# Patient Record
Sex: FEMALE | Race: WHITE | NOT HISPANIC OR LATINO | Employment: FULL TIME | URBAN - METROPOLITAN AREA
[De-identification: names, ages, dates, MRNs, and addresses within clinical notes are randomized per-mention and may not be internally consistent; named-entity substitution may affect disease eponyms.]

---

## 2023-03-22 ENCOUNTER — OFFICE VISIT (OUTPATIENT)
Dept: PODIATRY | Facility: CLINIC | Age: 78
End: 2023-03-22

## 2023-03-22 ENCOUNTER — APPOINTMENT (OUTPATIENT)
Dept: RADIOLOGY | Age: 78
End: 2023-03-22

## 2023-03-22 VITALS
BODY MASS INDEX: 23.04 KG/M2 | DIASTOLIC BLOOD PRESSURE: 80 MMHG | HEART RATE: 80 BPM | WEIGHT: 130 LBS | HEIGHT: 63 IN | SYSTOLIC BLOOD PRESSURE: 140 MMHG

## 2023-03-22 DIAGNOSIS — M19.072 OSTEOARTHRITIS OF FIRST METATARSOPHALANGEAL (MTP) JOINT OF LEFT FOOT: ICD-10-CM

## 2023-03-22 DIAGNOSIS — M20.12 HALLUX ABDUCTO VALGUS, LEFT: Primary | ICD-10-CM

## 2023-03-22 DIAGNOSIS — M20.12 HALLUX ABDUCTO VALGUS, LEFT: ICD-10-CM

## 2023-03-22 NOTE — PROGRESS NOTES
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  She states the shoes she is wearing today do not cause her much pain or discomfort  I was very honest with the patient and explained that if she can find a pair of shoes that reduce her pain and there is not much need to go forward with surgical reconstruction of the foot especially given that she is on Eliquis and that surgical reconstruction comes with its own risks  Patient has history of cancer involving chemotherapy and radiation  Before any bony surgery would be considered I would recommend she speak to her doctor about a bone density scan  She also states she needs a cardiologist as she has not had her defibrillator interrogated for quite some time  At the moment we had a simple discussion involving options with her foot  She needs a more further medical work-up as there are many questions I have regarding her overall general health for surgery  If the large bump is her only area of pain we could consider a simple exostectomy of the medial eminence as this would reduce the discomfort and different shoe gears  She could also consider a silicon joint implant but at the moment the joint is not her significant area of pain  Subjective:      Patient ID: Madelon Halsted is a 68 y o  female  Patient has chronic foot pain   For 8 years she has been getting shots into her bunion every 8-12 weeks by her podiatrist in [place]  She has severe pain from a bunion deformity  She is tired of injections but wants to know if there are different options  She has a pacemaker for a weak heart but never had heart attack  She has had colon cancer but in remission  She does not smoke  Her only meds are for her synthroid, elequis, and asthma medication  The following portions of the patient's history were reviewed and updated as appropriate: allergies, current medications, past family history, past medical history, past social history, past surgical history and problem list     Review of Systems    Constitutional: Negative  Respiratory: Negative for cough and shortness of breath  Gastrointestinal: Negative for diarrhea, nausea and vomiting  Musculoskeletal: bunion pain   Skin: Negative for rash or wound  Neurological: Negative for weakness, numbness and headaches  Objective:      /80   Pulse 80   Ht 5' 3" (1 6 m)   Wt 59 kg (130 lb)   BMI 23 03 kg/m²          Physical Exam  Vitals reviewed  Cardiovascular:      Pulses: Normal pulses  Dorsalis pedis pulses are 2+ on the left side  Posterior tibial pulses are 2+ on the left side  Pulmonary:      Effort: Pulmonary effort is normal  No respiratory distress  Musculoskeletal:         General: Tenderness and deformity present  Left foot: Decreased range of motion (crepitus first MTPJ)  Deformity and bunion (large medial eminence  ) present  Feet:      Left foot:      Protective Sensation: 10 sites tested  10 sites sensed  Skin integrity: No ulcer or skin breakdown  Skin:     Findings: No erythema or rash  Neurological:      Mental Status: She is alert and oriented to person, place, and time  Sensory: No sensory deficit  XRay 3 views of the left foot personally read by Dr Efrain Pinzon in office today and discussed with patient:  1  IM angle 18 degrees  2   Severe cartilage loss first MTPJ

## 2023-04-07 ENCOUNTER — OFFICE VISIT (OUTPATIENT)
Dept: INTERNAL MEDICINE CLINIC | Facility: CLINIC | Age: 78
End: 2023-04-07

## 2023-04-07 VITALS
HEIGHT: 63 IN | RESPIRATION RATE: 16 BRPM | DIASTOLIC BLOOD PRESSURE: 80 MMHG | SYSTOLIC BLOOD PRESSURE: 138 MMHG | WEIGHT: 127.8 LBS | HEART RATE: 80 BPM | BODY MASS INDEX: 22.64 KG/M2 | OXYGEN SATURATION: 97 % | TEMPERATURE: 97.5 F

## 2023-04-07 DIAGNOSIS — Z95.0 PRESENCE OF PERMANENT CARDIAC PACEMAKER: ICD-10-CM

## 2023-04-07 DIAGNOSIS — Z00.00 HEALTHCARE MAINTENANCE: ICD-10-CM

## 2023-04-07 DIAGNOSIS — E03.9 HYPOTHYROIDISM, UNSPECIFIED TYPE: ICD-10-CM

## 2023-04-07 DIAGNOSIS — J45.20 MILD INTERMITTENT ASTHMA WITHOUT COMPLICATION: ICD-10-CM

## 2023-04-07 DIAGNOSIS — H91.93 BILATERAL HEARING LOSS, UNSPECIFIED HEARING LOSS TYPE: ICD-10-CM

## 2023-04-07 DIAGNOSIS — M79.10 MYALGIA: ICD-10-CM

## 2023-04-07 DIAGNOSIS — Z85.820 HISTORY OF MELANOMA: ICD-10-CM

## 2023-04-07 DIAGNOSIS — Z85.038 HISTORY OF COLON CANCER: ICD-10-CM

## 2023-04-07 DIAGNOSIS — I10 PRIMARY HYPERTENSION: Primary | ICD-10-CM

## 2023-04-07 PROBLEM — IMO0001 HEARING IMPAIRMENT: Status: ACTIVE | Noted: 2023-04-07

## 2023-04-07 PROBLEM — C18.9 COLON CANCER (HCC): Status: ACTIVE | Noted: 2023-04-07

## 2023-04-07 PROBLEM — C18.9 COLON CANCER (HCC): Status: RESOLVED | Noted: 2023-04-07 | Resolved: 2023-04-07

## 2023-04-07 PROBLEM — H91.90 HEARING IMPAIRMENT: Status: ACTIVE | Noted: 2023-04-07

## 2023-04-07 RX ORDER — TEMAZEPAM 15 MG/1
15 CAPSULE ORAL
COMMUNITY

## 2023-04-07 RX ORDER — ALBUTEROL SULFATE 90 UG/1
AEROSOL, METERED RESPIRATORY (INHALATION)
COMMUNITY

## 2023-04-07 RX ORDER — MONTELUKAST SODIUM 10 MG/1
10 TABLET ORAL
COMMUNITY

## 2023-04-07 RX ORDER — FLUTICASONE FUROATE AND VILANTEROL 100; 25 UG/1; UG/1
1 POWDER RESPIRATORY (INHALATION) DAILY
COMMUNITY

## 2023-04-07 RX ORDER — LOSARTAN POTASSIUM 50 MG/1
50 TABLET ORAL DAILY
COMMUNITY

## 2023-04-07 RX ORDER — FLECAINIDE ACETATE 100 MG/1
100 TABLET ORAL 2 TIMES DAILY
COMMUNITY

## 2023-04-07 RX ORDER — LEVOTHYROXINE AND LIOTHYRONINE 9.5; 2.25 UG/1; UG/1
TABLET ORAL
COMMUNITY

## 2023-04-07 NOTE — PROGRESS NOTES
Assessment/Plan:   Patient Instructions   We will need to obtain records from providers in [place]  Have labs done and we will review  Referrals to cardiology, gastroenterology, dermatology, audiology, gynecology  Follow-up in 6 weeks, sooner as needed  Quality Measures:   Depression Screening and Follow-up Plan: Patient was screened for depression during today's encounter  They screened negative with a PHQ-2 score of 0  Return in about 6 weeks (around 5/19/2023) for Next scheduled follow up  Diagnoses and all orders for this visit:    Primary hypertension  -     CBC and differential; Future  -     Comprehensive metabolic panel; Future  -     Lipid panel; Future    Hypothyroidism, unspecified type  -     TSH, 3rd generation; Future  -     T4, free; Future    Mild intermittent asthma without complication    Bilateral hearing loss, unspecified hearing loss type  -     Ambulatory Referral to Audiology; Future    History of melanoma  -     Ambulatory referral to Dermatology; Future    Presence of permanent cardiac pacemaker  -     Ambulatory referral to Cardiology; Future    Myalgia  -     Vitamin D 25 hydroxy; Future    Healthcare maintenance  -     Ambulatory referral to Gynecology; Future    History of colon cancer  -     Ambulatory referral to Gastroenterology; Future    Other orders  -     albuterol (PROVENTIL HFA,VENTOLIN HFA) 90 mcg/act inhaler; Inhale  -     montelukast (SINGULAIR) 10 mg tablet; Take 10 mg by mouth  -     thyroid (ARMOUR) 15 MG tablet; Take by mouth  -     apixaban (Eliquis) 5 mg; Take 5 mg by mouth 2 (two) times a day  -     losartan (COZAAR) 50 mg tablet; Take 50 mg by mouth daily  -     temazepam (RESTORIL) 15 mg capsule; Take 15 mg by mouth daily at bedtime as needed for sleep  -     flecainide (TAMBOCOR) 100 mg tablet; Take 100 mg by mouth 2 (two) times a day  -     Cancel: Ambulatory referral to Gastroenterology;  Future  -     Fluticasone Furoate-Vilanterol 100-25 He always had  multiple nodules, reported on prior CT scan and recent CT scan.   Please let him know approach is similar and based on the size of the nodule mainly mcg/actuation inhaler; Inhale 1 puff daily Rinse mouth after use  Subjective:      Patient ID: Elliot Brumfield is a 68 y o  female  71-year-old female presents to establish with this practice accompanied by her sister-in-law who is a patient here along with her   Patient has moved from [place] and is currently residing with the in-laws  Patient is a vague historian  Patient is currently on flecainide and Eliquis, along with having a permanent pacemaker without knowledge of why she is on the medication or why the pacemaker was inserted  There are currently no records in the EMR to review regarding these conditions  Patient does have history of hypertension: Decent control today on current medication  Geno cp, palp, sob, edema, HA, dizziness, diaphoresis, syncope, visual disturbance  History of colon cancer, states she has not had colonoscopy in approximately 8 years  Denies any blood in her stool or abdominal pain  Past history of melanoma of her left arm  No recent follow-up with dermatology  She believes she has had a mammogram within 2 years  No GYN evaluation in over 2 years  Probable history of hypothyroidism for which she is on [place] Thyroid  Asymptomatic  History of asthma, she is on Breo, and has albuterol inhaler to use as needed  States she was very stable when in [place]  Also takes montelukast nightly  Sister-in-law and also patient notes decreased hearing  In fact patient wears an over-the-counter hearing aid in her left ear  They would like a hearing evaluation  No focal complaints today      History/PE as documented in the EMR      ALLERGIES:  Allergies   Allergen Reactions   • Codeine Nausea Only       CURRENT MEDICATIONS:    Current Outpatient Medications:   •  albuterol (PROVENTIL HFA,VENTOLIN HFA) 90 mcg/act inhaler, Inhale, Disp: , Rfl:   •  apixaban (Eliquis) 5 mg, Take 5 mg by mouth 2 (two) times a day, Disp: , Rfl:   •  flecainide (TAMBOCOR) 100 mg tablet, Take 100 mg by mouth 2 (two) times a day, Disp: , Rfl:   •  Fluticasone Furoate-Vilanterol 100-25 mcg/actuation inhaler, Inhale 1 puff daily Rinse mouth after use , Disp: , Rfl:   •  losartan (COZAAR) 50 mg tablet, Take 50 mg by mouth daily, Disp: , Rfl:   •  montelukast (SINGULAIR) 10 mg tablet, Take 10 mg by mouth, Disp: , Rfl:   •  temazepam (RESTORIL) 15 mg capsule, Take 15 mg by mouth daily at bedtime as needed for sleep, Disp: , Rfl:   •  thyroid (ARMOUR) 15 MG tablet, Take by mouth, Disp: , Rfl:     ACTIVE PROBLEM LIST:  Patient Active Problem List   Diagnosis   • History of melanoma   • Hypothyroid   • Primary hypertension   • Mild intermittent asthma without complication   • Presence of permanent cardiac pacemaker   • Hearing impairment   • History of colon cancer       PAST MEDICAL HISTORY:  Past Medical History:   Diagnosis Date   • Asthma    • Colon cancer ([facility] [address] )        PAST SURGICAL HISTORY:  Past Surgical History:   Procedure Laterality Date   • CARDIAC PACEMAKER PLACEMENT  2020   • COLON SURGERY     • CRANIOTOMY Right 1989    R bleed   • SKIN CANCER EXCISION Right     Melanoma R arm       FAMILY HISTORY:  Family History   Problem Relation Age of Onset   • Heart disease Mother    • COPD Sister    • Emphysema Sister    • Colon cancer Brother    • Liver cancer Paternal Grandfather        SOCIAL HISTORY:  Social History     Socioeconomic History   • Marital status: Single     Spouse name: Not on file   • Number of children: Not on file   • Years of education: Not on file   • Highest education level: Not on file   Occupational History   • Not on file   Tobacco Use   • Smoking status: Never   • Smokeless tobacco: Never   Vaping Use   • Vaping Use: Never used   Substance and Sexual Activity   • Alcohol use: Not Currently   • Drug use: Never   • Sexual activity: Not Currently   Other Topics Concern   • Not on file   Social History Narrative    Divorsed        1 child "Hobbies-art    Reg dental care, brushes twice daily     Social Determinants of Health     Financial Resource Strain: Not on file   Food Insecurity: Not on file   Transportation Needs: Not on file   Physical Activity: Not on file   Stress: Not on file   Social Connections: Not on file   Intimate Partner Violence: Not on file   Housing Stability: Not on file       Review of Systems   Constitutional: Negative for activity change, chills, fatigue and fever  HENT: Positive for hearing loss (Wears an OTC hearing aid in her left ear)  Negative for congestion  Eyes: Negative for discharge  Respiratory: Negative for cough, chest tightness, shortness of breath and wheezing  Cardiovascular: Negative for chest pain, palpitations and leg swelling  Gastrointestinal: Negative for abdominal pain, blood in stool, constipation, diarrhea, nausea and vomiting  Endocrine: Negative for polydipsia, polyphagia and polyuria  Genitourinary: Negative for difficulty urinating  Musculoskeletal: Negative for arthralgias and myalgias  Skin: Negative for rash  Allergic/Immunologic: Negative for immunocompromised state  Neurological: Negative for dizziness, syncope, weakness, light-headedness and headaches  Hematological: Negative for adenopathy  Does not bruise/bleed easily  Psychiatric/Behavioral: Positive for sleep disturbance (Has been on chronic Restoril for years)  Negative for dysphoric mood  The patient is not nervous/anxious  Objective:  Vitals:    04/07/23 0900   BP: 138/80   BP Location: Left arm   Patient Position: Sitting   Cuff Size: Adult   Pulse: 80   Resp: 16   Temp: 97 5 °F (36 4 °C)   TempSrc: Tympanic   SpO2: 97%   Weight: 58 kg (127 lb 12 8 oz)   Height: 5' 3\" (1 6 m)     Body mass index is 22 64 kg/m²  Physical Exam  Vitals and nursing note reviewed  Constitutional:       General: She is not in acute distress  Appearance: She is well-developed  She is not ill-appearing     HENT:    " Head: Normocephalic and atraumatic  Right Ear: Tympanic membrane, ear canal and external ear normal       Left Ear: Tympanic membrane, ear canal and external ear normal       Nose: Nose normal       Mouth/Throat:      Mouth: Mucous membranes are moist       Pharynx: Oropharynx is clear  Comments: Edentulous maxilla, corrected  Eyes:      General: No scleral icterus  Right eye: No discharge  Left eye: No discharge  Extraocular Movements: Extraocular movements intact  Conjunctiva/sclera: Conjunctivae normal       Pupils: Pupils are equal, round, and reactive to light  Neck:      Thyroid: No thyromegaly  Vascular: Carotid bruit (Bilateral carotid bruits, may represent transmitted murmur) present  Cardiovascular:      Rate and Rhythm: Normal rate and regular rhythm  Pulses: Normal pulses  Heart sounds: Murmur (Grade 2/6 systolic murmur best audible at the apex, grade 2 or 6 systolic murmur best audible in the aortic area radiating to the carotids) heard  Pulmonary:      Effort: Pulmonary effort is normal  No respiratory distress  Comments: Decreased breath sounds in the bases  Chest:      Chest wall: No tenderness  Abdominal:      General: Abdomen is flat  Bowel sounds are normal  There is no distension  Palpations: Abdomen is soft  There is no hepatomegaly, splenomegaly or mass  Tenderness: There is no abdominal tenderness  There is no right CVA tenderness, left CVA tenderness, guarding or rebound  Musculoskeletal:         General: No swelling  Normal range of motion  Cervical back: Normal range of motion and neck supple  Right lower leg: No edema  Left lower leg: No edema  Lymphadenopathy:      Cervical: No cervical adenopathy  Skin:     General: Skin is warm and dry  Findings: No rash  Neurological:      General: No focal deficit present  Mental Status: She is alert and oriented to person, place, and time  Cranial Nerves: No cranial nerve deficit  Sensory: No sensory deficit  Motor: No weakness  Coordination: Coordination normal       Gait: Gait normal       Deep Tendon Reflexes: Reflexes are normal and symmetric  Reflexes normal    Psychiatric:         Mood and Affect: Mood normal          Behavior: Behavior normal          Thought Content: Thought content normal          Judgment: Judgment normal            RESULTS:    In chart    This note was created with voice recognition software  Phonic, grammatical and spelling errors may be present within the note as a result

## 2023-04-07 NOTE — PATIENT INSTRUCTIONS
We will need to obtain records from providers in [place]  Have labs done and we will review  Referrals to cardiology, gastroenterology, dermatology, audiology, gynecology  Follow-up in 6 weeks, sooner as needed

## 2023-04-22 ENCOUNTER — HOSPITAL ENCOUNTER (EMERGENCY)
Facility: HOSPITAL | Age: 78
Discharge: HOME | End: 2023-04-22
Attending: EMERGENCY MEDICINE

## 2023-04-22 VITALS
DIASTOLIC BLOOD PRESSURE: 65 MMHG | RESPIRATION RATE: 16 BRPM | HEART RATE: 77 BPM | TEMPERATURE: 97.5 F | OXYGEN SATURATION: 97 % | SYSTOLIC BLOOD PRESSURE: 143 MMHG

## 2023-04-22 DIAGNOSIS — S81.802A NON-HEALING WOUND OF LEFT LOWER EXTREMITY: Primary | ICD-10-CM

## 2023-04-22 RX ORDER — GINSENG 100 MG
1 CAPSULE ORAL ONCE
Status: COMPLETED | OUTPATIENT
Start: 2023-04-22 | End: 2023-04-22

## 2023-04-22 RX ADMIN — BACITRACIN ZINC 1 SMALL APPLICATION: 500 OINTMENT TOPICAL at 14:02

## 2023-04-22 RX ADMIN — TETANUS TOXOID, REDUCED DIPHTHERIA TOXOID AND ACELLULAR PERTUSSIS VACCINE, ADSORBED 0.5 ML: 5; 2.5; 8; 8; 2.5 SUSPENSION INTRAMUSCULAR at 14:10

## 2023-04-22 NOTE — ED PROVIDER NOTES
History  Chief Complaint   Patient presents with   • Leg Injury     Pt ran into the  lid 1 month ago, has had laceration to L ankle area since  Reports its not healing and continues to bleed  Pt is on thinners  HPI    Prior to Admission Medications   Prescriptions Last Dose Informant Patient Reported? Taking? Fluticasone Furoate-Vilanterol 100-25 mcg/actuation inhaler   Yes No   Sig: Inhale 1 puff daily Rinse mouth after use  albuterol (PROVENTIL HFA,VENTOLIN HFA) 90 mcg/act inhaler   Yes No   Sig: Inhale   apixaban (Eliquis) 5 mg   Yes No   Sig: Take 5 mg by mouth 2 (two) times a day   flecainide (TAMBOCOR) 100 mg tablet   Yes No   Sig: Take 100 mg by mouth 2 (two) times a day   losartan (COZAAR) 50 mg tablet   Yes No   Sig: Take 50 mg by mouth daily   montelukast (SINGULAIR) 10 mg tablet   Yes No   Sig: Take 10 mg by mouth   temazepam (RESTORIL) 15 mg capsule   Yes No   Sig: Take 15 mg by mouth daily at bedtime as needed for sleep   thyroid (ARMOUR) 15 MG tablet   Yes No   Sig: Take by mouth      Facility-Administered Medications: None       Past Medical History:   Diagnosis Date   • Asthma    • Colon cancer (HCC)        Past Surgical History:   Procedure Laterality Date   • CARDIAC PACEMAKER PLACEMENT  2020   • COLON SURGERY     • CRANIOTOMY Right 1989    R bleed   • SKIN CANCER EXCISION Right     Melanoma R arm       Family History   Problem Relation Age of Onset   • Heart disease Mother    • COPD Sister    • Emphysema Sister    • Colon cancer Brother    • Liver cancer Paternal Grandfather      I have reviewed and agree with the history as documented      E-Cigarette/Vaping   • E-Cigarette Use Never User      E-Cigarette/Vaping Substances   • Nicotine No    • THC No    • CBD No    • Flavoring No    • Other No    • Unknown No      Social History     Tobacco Use   • Smoking status: Never   • Smokeless tobacco: Never   Vaping Use   • Vaping Use: Never used   Substance Use Topics   • Alcohol use: Not Currently   • Drug use: Never       Review of Systems    Physical Exam  Physical Exam  Vitals and nursing note reviewed  Constitutional:       General: She is not in acute distress  Appearance: She is well-developed  HENT:      Head: Normocephalic and atraumatic  Eyes:      Conjunctiva/sclera: Conjunctivae normal       Pupils: Pupils are equal, round, and reactive to light  Neck:      Trachea: No tracheal deviation  Cardiovascular:      Rate and Rhythm: Normal rate and regular rhythm  Pulses:           Dorsalis pedis pulses are 2+ on the left side  Pulmonary:      Effort: Pulmonary effort is normal  No respiratory distress  Musculoskeletal:      Cervical back: Normal range of motion  Skin:     General: Skin is warm and dry  Comments: Wound to leg (see picture below)- bandage damp, mild drip blood, no other drainage  Thick layer of antibiotic ointment  Roughly oval shaped, 4 cm max diameter, loss of top layer of skin, moist, mild maceration around wound edges  Mild purplish discoloration just around edge of wound just beneath bandage, no warmth or erythema, no streaking redness   Neurological:      Mental Status: She is alert and oriented to person, place, and time  GCS: GCS eye subscore is 4  GCS verbal subscore is 5  GCS motor subscore is 6     Psychiatric:         Behavior: Behavior normal                        Vital Signs  ED Triage Vitals [04/22/23 1302]   Temperature Pulse Respirations Blood Pressure SpO2   97 5 °F (36 4 °C) 77 16 143/65 97 %      Temp Source Heart Rate Source Patient Position - Orthostatic VS BP Location FiO2 (%)   Temporal Monitor Sitting Left arm --      Pain Score       9           Vitals:    04/22/23 1302   BP: 143/65   Pulse: 77   Patient Position - Orthostatic VS: Sitting         Visual Acuity      ED Medications  Medications - No data to display    Diagnostic Studies  Results Reviewed     None                 No orders to display "      Procedures  Procedures         ED Course               Identification of Seniors at Risk    Flowsheet Row Most Recent Value   (ISAR) Identification of Seniors at Risk    Before the illness or injury that brought you to the Emergency, did you need someone to help you on a regular basis? 0 Filed at: 04/22/2023 1305   In the last 24 hours, have you needed more help than usual? 0 Filed at: 04/22/2023 1305   Have you been hospitalized for one or more nights during the past 6 months? 0 Filed at: 04/22/2023 1305   In general, do you see well? 0 Filed at: 04/22/2023 1305   In general, do you have serious problems with your memory? 0 Filed at: 04/22/2023 1305   Do you take more than three different medications every day? 1 Filed at: 04/22/2023 1305   ISAR Score 1 Filed at: 04/22/2023 1305                      SBIRT 20yo+    Flowsheet Row Most Recent Value   Initial Alcohol Screen: US AUDIT-C     1  How often do you have a drink containing alcohol? 0 Filed at: 04/22/2023 1305   2  How many drinks containing alcohol do you have on a typical day you are drinking? 0 Filed at: 04/22/2023 1305   3a  Male UNDER 65: How often do you have five or more drinks on one occasion? 0 Filed at: 04/22/2023 1305   3b  FEMALE Any Age, or MALE 65+: How often do you have 4 or more drinks on one occassion? 0 Filed at: 04/22/2023 1305   Audit-C Score 0 Filed at: 04/22/2023 1305   STARR: How many times in the past year have you    Used an illegal drug or used a prescription medication for non-medical reasons? Never Filed at: 04/22/2023 1305                    Medical Decision Making  This is a 72-year-old female who presents here today for evaluation of a nonhealing wound to her left lower leg  She says about a month or so ago she had a \"gash\" to her leg that she says is not healing  She says she walked into the corner of her open  door and that there was a flap of skin  She says she pressed it back down over the wound    She " does not know if and when it fell off  She says she has been cleaning it several times a day with hydrogen peroxide, and keeping it covered with Neosporin and a bandage  She says it does not seem to be getting better  She is having pain in the area and has been taking Tylenol and ibuprofen without significant improvement  She denies fevers, streaking redness, nausea or vomiting, other systemic symptoms  She says she will sometimes get mild bloody discharge, but will not get purulent discharge  She denies known prior problems with wound healing  She says she saw her new doctor at the beginning of the month to establish care, and mentioned her wound  She was told at the time that she was doing what she should  However, she says it does not seem to be getting better  Review of systems: Otherwise negative listed as above    She is well-appearing, no acute distress  She has a wound to her left lower leg as above, without signs of acute infection  She has 2+ peripheral pulses  Exam is otherwise unremarkable  Some of the skin discoloration may be due to the Neosporin  Appearance of the wound is likely due to a combination of excess moisture given the macerated skin at the edges, and use of hydrogen peroxide preventing wound healing  I discussed with patient and sister findings, management of the wound at home, follow-up closely with wound care for further evaluation and management, and indications for return, and they expressed understanding with this plan      Non-healing wound of left lower extremity: acute illness or injury      Disposition  Final diagnoses:   Non-healing wound of left lower extremity     Time reflects when diagnosis was documented in both MDM as applicable and the Disposition within this note     Time User Action Codes Description Comment    4/22/2023  1:39 PM Kym Buchanan Add [[id number] [id number]] Non-healing wound of left lower extremity       ED Disposition     ED Disposition Discharge    Condition   Fair    Date/Time   Sat Apr 22, 2023 [address] discharge to home/self care           Follow-up Information     Follow up With Specialties Details Why Contact Info Additional Information    [address] Schedule an appointment as soon as possible for a visit  to follow up on the wound [address] [address] [facility] [id number] [facility] [address], [employer], [address]            Patient's Medications   Discharge Prescriptions    No medications on file           PDMP Review     None          ED Provider  Electronically Signed by           Nona Dunbar MD  04/23/23 7065

## 2023-04-22 NOTE — DISCHARGE INSTRUCTIONS
Gently clean the wound with soap and water when you take a shower and if it is dirty  Cover it with a thin coating of bacitracin and a Band-Aid when you are doing anything where it might get dirty  Otherwise, leave it open and exposed to air as much as possible as moisture can cause further skin breakdown  Neosporin can cause irritation in some people, so avoid using this  Do not clean the wound with peroxide unless it is grossly dirty, as frequent use can kill off newly forming skin cells  Follow-up with wound care for further evaluation and management of your wound, and to ensure that it is healing appropriately

## 2023-04-27 ENCOUNTER — OFFICE VISIT (OUTPATIENT)
Dept: WOUND CARE | Facility: CLINIC | Age: 78
End: 2023-04-27

## 2023-04-27 VITALS
HEART RATE: 80 BPM | HEIGHT: 62 IN | BODY MASS INDEX: 23.19 KG/M2 | SYSTOLIC BLOOD PRESSURE: 180 MMHG | WEIGHT: 126 LBS | RESPIRATION RATE: 18 BRPM | DIASTOLIC BLOOD PRESSURE: 84 MMHG | TEMPERATURE: 95.5 F

## 2023-04-27 DIAGNOSIS — S81.802A TRAUMATIC OPEN WOUND OF LEFT LOWER LEG, INITIAL ENCOUNTER: Primary | ICD-10-CM

## 2023-04-27 RX ORDER — LIDOCAINE 40 MG/G
CREAM TOPICAL ONCE
Status: COMPLETED | OUTPATIENT
Start: 2023-04-27 | End: 2023-04-27

## 2023-04-27 RX ADMIN — LIDOCAINE: 40 CREAM TOPICAL at 09:35

## 2023-04-27 NOTE — PROGRESS NOTES
Patient ID: Veto Hair is a 68 y o  female Date of Birth 1945       Chief Complaint   Patient presents with   • New Patient Visit     Left leg wound       Allergies:  Codeine    Diagnosis:      Diagnosis ICD-10-CM Associated Orders   1  Traumatic open wound of left lower leg, initial encounter  S81 802A lidocaine (LMX) 4 % cream     Wound cleansing and dressings              Assessment and Plan :  • Initial Evaluation of open traumatic wound on LLE  No signs of infection today  • Debrided as below  • Wound management with 5 min Prophase soak and Maxorb Ag on wound bed  See wound orders below   • Compression with Tubigrip  • No harsh cleansers such as alcohol, peroxide, or antibacterial soap, do not submerge in water  • Can cleanse with mild soap and water at dressing changes  • Counseled on importance of frequent elevation of leg and increase exercise/walkin for wound healing  • Followup in 1 week(s) or call sooner with questions or concerns or any signs of infection such as redness, swelling, increased/purulent drainage, fever, chills, increased severe pain  Subjective:   Pt is a 68 y o  female with pmhx Colon Cancer, Melanoma, Hypothyroidism, HTN, asthma, decreased hearing and pacemaker placement on Eliquis who presents for initial eval of LLE open traumatic wound which has been present for about a month after she bumped her leg against the  door  Pt has been cleaning wound with hydrogen peroxide and applying neosporin on the wound bed  Report serosanguinous drainage  No diabetes   No smoking, ETOH or drug use  Pt denies any sob, fatigue, N/V, CP, fever or chills          The following portions of the patient's history were reviewed and updated as appropriate:   Patient Active Problem List   Diagnosis   • History of melanoma   • Hypothyroid   • Primary hypertension   • Mild intermittent asthma without complication   • Presence of permanent cardiac pacemaker   • Hearing impairment   • History of colon cancer     Past Medical History:   Diagnosis Date   • Asthma    • Colon cancer ([address] )      Past Surgical History:   Procedure Laterality Date   • CARDIAC PACEMAKER PLACEMENT  2020   • COLON SURGERY     • CRANIOTOMY Right 1989    R bleed   • SKIN CANCER EXCISION Right     Melanoma R arm     Family History   Problem Relation Age of Onset   • Heart disease Mother    • COPD Sister    • Emphysema Sister    • Colon cancer Brother    • Liver cancer Paternal Grandfather       Social History     Socioeconomic History   • Marital status: Single     Spouse name: None   • Number of children: None   • Years of education: None   • Highest education level: None   Occupational History   • None   Tobacco Use   • Smoking status: Never   • Smokeless tobacco: Never   Vaping Use   • Vaping Use: Never used   Substance and Sexual Activity   • Alcohol use: Not Currently   • Drug use: Never   • Sexual activity: Not Currently   Other Topics Concern   • None   Social History Narrative    Divorsed        1 child    Hobbies-art    Reg dental care, brushes twice daily     Social Determinants of Health     Financial Resource Strain: Not on file   Food Insecurity: Not on file   Transportation Needs: Not on file   Physical Activity: Not on file   Stress: Not on file   Social Connections: Not on file   Intimate Partner Violence: Not on file   Housing Stability: Not on file        Current Outpatient Medications:   •  albuterol (PROVENTIL HFA,VENTOLIN HFA) 90 mcg/act inhaler, Inhale, Disp: , Rfl:   •  apixaban (ELIQUIS) 5 mg, Take 5 mg by mouth 2 (two) times a day, Disp: , Rfl:   •  flecainide (TAMBOCOR) 100 mg tablet, Take 100 mg by mouth 2 (two) times a day, Disp: , Rfl:   •  Fluticasone Furoate-Vilanterol 100-25 mcg/actuation inhaler, Inhale 1 puff daily Rinse mouth after use , Disp: , Rfl:   •  losartan (COZAAR) 50 mg tablet, Take 50 mg by mouth daily, Disp: , Rfl:   •  montelukast (SINGULAIR) 10 mg tablet, Take 10 mg "by mouth, Disp: , Rfl:   •  thyroid (ARMOUR) 15 MG tablet, Take by mouth, Disp: , Rfl:   •  temazepam (RESTORIL) 15 mg capsule, Take 15 mg by mouth daily at bedtime as needed for sleep (Patient not taking: Reported on 4/27/2023), Disp: , Rfl:   No current facility-administered medications for this visit  Review of Systems   Constitutional: Negative for appetite change, chills, fatigue, fever and unexpected weight change  HENT: Negative for congestion, hearing loss and postnasal drip  Respiratory: Negative for cough and shortness of breath  Cardiovascular: Negative for leg swelling  Skin: Positive for wound (LLE)  Negative for rash  Neurological: Negative for numbness  Hematological: Does not bruise/bleed easily  Psychiatric/Behavioral: Negative  Objective:  BP (!) 180/84   Pulse 80   Temp (!) 95 5 °F (35 3 °C)   Resp 18   Ht 5' 2\" (1 575 m)   Wt 57 2 kg (126 lb)   BMI 23 05 kg/m²   Pain Score:   3     Physical Exam  Vitals reviewed  Constitutional:       General: She is not in acute distress  Appearance: Normal appearance  She is well-developed and normal weight  HENT:      Head: Normocephalic and atraumatic  Cardiovascular:      Rate and Rhythm: Normal rate  Pulmonary:      Effort: Pulmonary effort is normal    Musculoskeletal:         General: No deformity  Right lower leg: No edema  Left lower leg: No edema  Skin:     General: Skin is warm and dry  Findings: Wound (LLE) present  Comments: Yellow slough on pink granulated wound bed  See wound assessment   Neurological:      General: No focal deficit present  Mental Status: She is alert and oriented to person, place, and time  Gait: Gait normal    Psychiatric:         Mood and Affect: Mood and affect normal          Behavior: Behavior normal  Behavior is cooperative                       Wound 04/27/23 Traumatic Pretibial Left (Active)   Wound Description Pink;Yellow 04/27/23 0931 " "  Stacia-wound Assessment Pink 04/27/23 0931   Wound Length (cm) 3 8 cm 04/27/23 0931   Wound Width (cm) 2 4 cm 04/27/23 0931   Wound Depth (cm) 0 1 cm 04/27/23 0931   Wound Surface Area (cm^2) 9 12 cm^2 04/27/23 0931   Wound Volume (cm^3) 0 912 cm^3 04/27/23 0931   Calculated Wound Volume (cm^3) 0 91 cm^3 04/27/23 0931   Drainage Amount Moderate 04/27/23 0931   Drainage Description Serosanguineous 04/27/23 0931   Non-staged Wound Description Full thickness 04/27/23 0931   Dressing Status Intact 04/27/23 0931       Debridement   Wound 04/27/23 Traumatic Pretibial Left    Universal Protocol:  Consent: Verbal consent obtained  Written consent obtained  Risks and benefits: risks, benefits and alternatives were discussed  Consent given by: patient  Time out: Immediately prior to procedure a \"time out\" was called to verify the correct patient, procedure, equipment, support staff and site/side marked as required  Patient understanding: patient states understanding of the procedure being performed  Patient identity confirmed: verbally with patient      Performed by: PA  Debridement type: surgical  Level of debridement: subcutaneous tissue  Pain control: lidocaine 4%  Post-debridement measurements  Length (cm): 3 8  Width (cm): 2 4  Depth (cm): 0 2  Percent debrided: 100%  Surface Area (cm^2): 9 12  Area debrided (cm^2): 9 12  Volume (cm^3): 1 82  Tissue and other material debrided: subcutaneous tissue  Devitalized tissue debrided: biofilm, exudate, fibrin and slough  Instrument(s) utilized: curette  Bleeding: small  Hemostasis obtained with: pressure  Procedural pain (0-10): 0  Post-procedural pain: 0   Response to treatment: procedure was tolerated well                   Wound Instructions:  Orders Placed This Encounter   Procedures   • Wound cleansing and dressings     Left Leg Wound    Wash your hands with soap and water  Remove old dressing, discard into plastic bag and place in trash    Cleanse the wound with prophase " "5 minute soak prior to applying a clean dressing  Do not use tissue or cotton balls  Do not scrub the wound  Pat dry using gauze  Shower no sponge bath only for this week       Apply Maxsorg Ag (silver alginate) to the leg wound  Cover with gauze or abd pad  Secure with joe and tape  Change dressing daily    Wear spandigrip F for light compression     Standing Status:   Future     Standing Expiration Date:   4/27/2024       Total time spent today:  30 minutes  This includes reviewing the patient's chart, pertinent physician records Dr Luana Burns, Emergency Medicine, 4/22/23, Dr Stu Alicea, Internal Medicine, 4/7/23  Eh Whipple PA-C, [facility]    Portions of the record may have been created with voice recognition software  Occasional wrong word or \"sound alike\" substitutions may have occurred due to the inherent limitations of voice recognition software  Read the chart carefully and recognize, using context, where substitutions have occurred      "

## 2023-05-04 ENCOUNTER — OFFICE VISIT (OUTPATIENT)
Dept: WOUND CARE | Facility: CLINIC | Age: 78
End: 2023-05-04

## 2023-05-04 VITALS
TEMPERATURE: 97.9 F | HEART RATE: 111 BPM | DIASTOLIC BLOOD PRESSURE: 72 MMHG | RESPIRATION RATE: 20 BRPM | SYSTOLIC BLOOD PRESSURE: 132 MMHG

## 2023-05-04 DIAGNOSIS — S81.802A TRAUMATIC OPEN WOUND OF LEFT LOWER LEG, INITIAL ENCOUNTER: Primary | ICD-10-CM

## 2023-05-04 RX ORDER — LIDOCAINE HYDROCHLORIDE 40 MG/ML
5 SOLUTION TOPICAL ONCE
Status: COMPLETED | OUTPATIENT
Start: 2023-05-04 | End: 2023-05-04

## 2023-05-04 RX ADMIN — LIDOCAINE HYDROCHLORIDE 5 ML: 40 SOLUTION TOPICAL at 11:12

## 2023-05-04 NOTE — PATIENT INSTRUCTIONS
Orders Placed This Encounter   Procedures    Wound cleansing and dressings     Left Leg Wound     Wash your hands with soap and water  Remove old dressing, discard into plastic bag and place in trash  Cleanse the wound with prophase 5 minute soak prior to applying a clean dressing  Do not use tissue or cotton balls  Do not scrub the wound  Pat dry using gauze  Shower no sponge bath only for this week       Apply Maxsorg Ag (silver alginate) to the leg wound    Cover with gauze or abd pad   Secure with joe and tape   Change dressing every other day and as needed    Wear spandigrip F for light compression     Standing Status:   Future     Standing Expiration Date:   5/4/2024

## 2023-05-04 NOTE — PROGRESS NOTES
"Patient ID: Starr Agrawal is a 68 y o  female Date of Birth 1945     Chief Complaint  Chief Complaint   Patient presents with    Follow Up Wound Care Visit     LLE wound       Allergies  Codeine    Assessment:    No problem-specific Assessment & Plan notes found for this encounter  Diagnoses and all orders for this visit:    Traumatic open wound of left lower leg, initial encounter  -     lidocaine (XYLOCAINE) 4 % topical solution 5 mL  -     Wound cleansing and dressings; Future  -     Debridement              Debridement   Wound 04/27/23 Traumatic Pretibial Left    Universal Protocol:  Consent: Verbal consent obtained  Consent given by: patient  Time out: Immediately prior to procedure a \"time out\" was called to verify the correct patient, procedure, equipment, support staff and site/side marked as required    Timeout called at: 5/4/2023 11:11 AM   Patient understanding: patient states understanding of the procedure being performed  Patient identity confirmed: verbally with patient      Performed by: physician  Debridement type: surgical  Level of debridement: subcutaneous tissue  Pain control: lidocaine 4%  Post-debridement measurements  Length (cm): 3 2  Width (cm): 2 3  Depth (cm): 0 2  Percent debrided: 100%  Surface Area (cm^2): 7 36  Area debrided (cm^2): 7 36  Volume (cm^3): 1 47  Tissue and other material debrided: subcutaneous tissue  Devitalized tissue debrided: exudate and slough  Instrument(s) utilized: curette  Bleeding: small  Hemostasis obtained with: pressure  Response to treatment: procedure was tolerated well          Plan:  Wound is improved  Debrided as above  Continue wound management with silver alginate but decrease dressing change frequency to every other day instead of daily, see wound orders below  Compression with Tubigrip's  Keep legs elevated whenever seated and avoid prolonged standing  Follow-up in 1 week or call sooner with questions or concerns      Wound 04/27/23 " Traumatic Pretibial Left (Active)   Wound Image Images linked 05/04/23 1117   Wound Description Pink;Yellow; Beefy red 05/04/23 1110   Stacia-wound Assessment [place] 05/04/23 1110   Wound Length (cm) 3 2 cm 05/04/23 1110   Wound Width (cm) 2 3 cm 05/04/23 1110   Wound Depth (cm) 0 1 cm 05/04/23 1110   Wound Surface Area (cm^2) 7 36 cm^2 05/04/23 1110   Wound Volume (cm^3) 0 736 cm^3 05/04/23 1110   Calculated Wound Volume (cm^3) 0 74 cm^3 05/04/23 1110   Change in Wound Size % 18 68 05/04/23 1110   Drainage Amount Moderate 05/04/23 1110   Drainage Description Serosanguineous 05/04/23 1110   Non-staged Wound Description Full thickness 05/04/23 1110   Dressing Status Intact 05/04/23 1110       Wound 04/27/23 Traumatic Pretibial Left (Active)   Date First Assessed/Time First Assessed: 04/27/23 0927   Primary Wound Type: Traumatic  Location: Pretibial  Wound Location Orientation: Left       Subjective:           4/27/23: Pt is a 68 y o  female with pmhx Colon Cancer, Melanoma, Hypothyroidism, HTN, asthma, decreased hearing and pacemaker placement on Eliquis who presents for initial eval of LLE open traumatic wound which has been present for about a month after she bumped her leg against the  door  Pt has been cleaning wound with hydrogen peroxide and applying neosporin on the wound bed  Report serosanguinous drainage  No diabetes   No smoking, ETOH or drug use  Pt denies any sob, fatigue, N/V, CP, fever or chills      5/4: Follow-up left lower extremity traumatic wound  No increased pain or drainage  Has been using silver alginate on the wound      The following portions of the patient's history were reviewed and updated as appropriate:   She  has a past medical history of Asthma and Colon cancer ([facility] [address] )    She   Patient Active Problem List    Diagnosis Date Noted    Hypothyroid 04/07/2023    Primary hypertension 04/07/2023    Mild intermittent asthma without complication 99/91/1837    Presence of permanent cardiac pacemaker 04/07/2023    Hearing impairment 04/07/2023    History of colon cancer 04/07/2023    History of melanoma 01/14/2013     She  reports that she has never smoked  She has never used smokeless tobacco  She reports that she does not currently use alcohol  She reports that she does not use drugs  Current Outpatient Medications   Medication Sig Dispense Refill    albuterol (PROVENTIL HFA,VENTOLIN HFA) 90 mcg/act inhaler Inhale      apixaban (ELIQUIS) 5 mg Take 5 mg by mouth 2 (two) times a day      flecainide (TAMBOCOR) 100 mg tablet Take 100 mg by mouth 2 (two) times a day      Fluticasone Furoate-Vilanterol 100-25 mcg/actuation inhaler Inhale 1 puff daily Rinse mouth after use   losartan (COZAAR) 50 mg tablet Take 50 mg by mouth daily      montelukast (SINGULAIR) 10 mg tablet Take 10 mg by mouth      temazepam (RESTORIL) 15 mg capsule Take 15 mg by mouth daily at bedtime as needed for sleep (Patient not taking: Reported on 4/27/2023)      thyroid (ARMOUR) 15 MG tablet Take by mouth       No current facility-administered medications for this visit  She is allergic to codeine       Review of Systems   Constitutional: Negative for chills and fever  HENT: Negative for congestion and sneezing  Respiratory: Negative for cough  Skin: Positive for wound  Psychiatric/Behavioral: Negative for agitation  Objective:       Wound 04/27/23 Traumatic Pretibial Left (Active)   Wound Image Images linked 05/04/23 1117   Wound Description Pink;Yellow; Beefy red 05/04/23 1110   Stacia-wound Assessment [place] 05/04/23 1110   Wound Length (cm) 3 2 cm 05/04/23 1110   Wound Width (cm) 2 3 cm 05/04/23 1110   Wound Depth (cm) 0 1 cm 05/04/23 1110   Wound Surface Area (cm^2) 7 36 cm^2 05/04/23 1110   Wound Volume (cm^3) 0 736 cm^3 05/04/23 1110   Calculated Wound Volume (cm^3) 0 74 cm^3 05/04/23 1110   Change in Wound Size % 18 68 05/04/23 1110   Drainage Amount Moderate 05/04/23 1110   Drainage Description Serosanguineous 05/04/23 1110   Non-staged Wound Description Full thickness 05/04/23 1110   Dressing Status Intact 05/04/23 1110       /72   Pulse (!) 111   Temp 97 9 °F (36 6 °C)   Resp 20     Physical Exam  Vitals reviewed  Constitutional:       General: She is not in acute distress  Appearance: Normal appearance  She is normal weight  She is not ill-appearing, toxic-appearing or diaphoretic  HENT:      Head: Normocephalic and atraumatic  Right Ear: External ear normal       Left Ear: External ear normal    Eyes:      Conjunctiva/sclera: Conjunctivae normal    Pulmonary:      Effort: Pulmonary effort is normal  No respiratory distress  Musculoskeletal:      Cervical back: Neck supple  Skin:     Comments: See wound assessment   Neurological:      Mental Status: She is alert  Psychiatric:         Mood and Affect: Mood normal          Behavior: Behavior normal            Wound 04/27/23 Traumatic Pretibial Left (Active)   Wound Image   05/04/23 1117   Wound Description Pink;Yellow; Beefy red 05/04/23 1110   Stacia-wound Assessment [place] 05/04/23 1110   Wound Length (cm) 3 2 cm 05/04/23 1110   Wound Width (cm) 2 3 cm 05/04/23 1110   Wound Depth (cm) 0 1 cm 05/04/23 1110   Wound Surface Area (cm^2) 7 36 cm^2 05/04/23 1110   Wound Volume (cm^3) 0 736 cm^3 05/04/23 1110   Calculated Wound Volume (cm^3) 0 74 cm^3 05/04/23 1110   Change in Wound Size % 18 68 05/04/23 1110   Drainage Amount Moderate 05/04/23 1110   Drainage Description Serosanguineous 05/04/23 1110   Non-staged Wound Description Full thickness 05/04/23 1110   Dressing Status Intact 05/04/23 1110                         Wound Instructions:  Orders Placed This Encounter   Procedures    Wound cleansing and dressings     Left Leg Wound     Wash your hands with soap and water   Remove old dressing, discard into plastic bag and place in trash   Cleanse the wound with prophase 5 minute soak prior to applying a clean dressing   Do not use tissue or cotton balls  Do not scrub the wound  Pat dry using gauze  Shower no sponge bath only for this week       Apply Maxsorg Ag (silver alginate) to the leg wound   Cover with gauze or abd pad   Secure with joe and tape   Change dressing every other day and as needed    Wear spandigrip F for light compression     Standing Status:   Future     Standing Expiration Date:   5/4/2024    Debridement     This order was created via procedure documentation        Diagnosis ICD-10-CM Associated Orders   1   Traumatic open wound of left lower leg, initial encounter  S81 802A lidocaine (XYLOCAINE) 4 % topical solution 5 mL     Wound cleansing and dressings     Debridement

## 2023-05-11 ENCOUNTER — OFFICE VISIT (OUTPATIENT)
Dept: WOUND CARE | Facility: CLINIC | Age: 78
End: 2023-05-11

## 2023-05-11 VITALS
TEMPERATURE: 97.1 F | SYSTOLIC BLOOD PRESSURE: 172 MMHG | RESPIRATION RATE: 18 BRPM | DIASTOLIC BLOOD PRESSURE: 81 MMHG | HEART RATE: 88 BPM

## 2023-05-11 DIAGNOSIS — S81.802A TRAUMATIC OPEN WOUND OF LEFT LOWER LEG, INITIAL ENCOUNTER: Primary | ICD-10-CM

## 2023-05-11 RX ORDER — LIDOCAINE HYDROCHLORIDE 40 MG/ML
5 SOLUTION TOPICAL ONCE
Status: COMPLETED | OUTPATIENT
Start: 2023-05-11 | End: 2023-05-11

## 2023-05-11 RX ADMIN — LIDOCAINE HYDROCHLORIDE 5 ML: 40 SOLUTION TOPICAL at 10:48

## 2023-05-11 NOTE — PATIENT INSTRUCTIONS
Orders Placed This Encounter   Procedures    Wound cleansing and dressings     Left Leg Wound      Wash your hands with soap and water  Remove old dressing, discard into plastic bag and place in trash  Cleanse the wound with prophase 5 minute soak prior to applying a clean dressing  Do not use tissue or cotton balls  Do not scrub the wound  Pat dry using gauze  Shower no sponge bath only for this week      Calmoseptine or Desitin around the wound   Apply polymem max (cut slits in the polymem) to the leg wound   Pt given polymem max  Cover with gauze or abd pad   Secure with joe and tape   Change dressing every other day and as needed     Wear spandigrip E or F for light compression     Standing Status:   Future     Standing Expiration Date:   5/11/2024

## 2023-05-11 NOTE — PROGRESS NOTES
Patient ID: Kirby Melton is a 68 y o  female Date of Birth 1945       Chief Complaint   Patient presents with   • Follow Up Wound Care Visit     LLE wound       Allergies:  Codeine    Diagnosis:   Diagnosis ICD-10-CM Associated Orders   1  Traumatic open wound of left lower leg, initial encounter  S81 802A lidocaine (XYLOCAINE) 4 % topical solution 5 mL     Wound cleansing and dressings           Assessment and Plan :  • F/u evaluation of open traumatic wound on LLE slowly improving  • Debrided as below  • Change wound management with 5 min Prophase soak and Polymem Ag to wound bed with Calmoseptine to wound edges   See wound orders below   • Compression with Tubigrip  • No harsh cleansers such as alcohol, peroxide, or antibacterial soap, do not submerge in water  • Can cleanse with Prophase at dressing changes  • Continue frequent elevation of leg and increase exercise/walkin for wound healing  • Followup in 1 week(s) or call sooner with questions or concerns or any signs of infection such as redness, swelling, increased/purulent drainage, fever, chills, increased severe pain  Subjective:   4/27/23: Pt is a 68 y o  female with pmhx Colon Cancer, Melanoma, Hypothyroidism, HTN, asthma, decreased hearing and pacemaker placement on Eliquis who presents for initial eval of LLE open traumatic wound which has been present for about a month after she bumped her leg against the  door  Pt has been cleaning wound with hydrogen peroxide and applying neosporin on the wound bed  Report serosanguinous drainage  No diabetes   No smoking, ETOH or drug use  Pt denies any sob, fatigue, N/V, CP, fever or chills      5/4: Follow-up left lower extremity traumatic wound  No increased pain or drainage  Has been using silver alginate on the wound  5/11: Patient presents for followup evaluation of LLE venous ulcer   Pt states silver alginate dressing has been adhering to wound bed   Pt denies any fever or chills      The following portions of the patient's history were reviewed and updated as appropriate:   Patient Active Problem List   Diagnosis   • History of melanoma   • Hypothyroid   • Primary hypertension   • Mild intermittent asthma without complication   • Presence of permanent cardiac pacemaker   • Hearing impairment   • History of colon cancer     Past Medical History:   Diagnosis Date   • Asthma    • Colon cancer ([facility] [address] )      Past Surgical History:   Procedure Laterality Date   • CARDIAC PACEMAKER PLACEMENT  2020   • COLON SURGERY     • CRANIOTOMY Right 1989    R bleed   • SKIN CANCER EXCISION Right     Melanoma R arm     Family History   Problem Relation Age of Onset   • Heart disease Mother    • COPD Sister    • Emphysema Sister    • Colon cancer Brother    • Liver cancer Paternal Grandfather      Social History     Socioeconomic History   • Marital status: Single     Spouse name: Not on file   • Number of children: Not on file   • Years of education: Not on file   • Highest education level: Not on file   Occupational History   • Not on file   Tobacco Use   • Smoking status: Never   • Smokeless tobacco: Never   Vaping Use   • Vaping Use: Never used   Substance and Sexual Activity   • Alcohol use: Not Currently   • Drug use: Never   • Sexual activity: Not Currently   Other Topics Concern   • Not on file   Social History Narrative    Divorsed        1 child    Hobbies-art    Reg dental care, brushes twice daily     Social Determinants of Health     Financial Resource Strain: Not on file   Food Insecurity: Not on file   Transportation Needs: Not on file   Physical Activity: Not on file   Stress: Not on file   Social Connections: Not on file   Intimate Partner Violence: Not on file   Housing Stability: Not on file       Current Outpatient Medications:   •  albuterol (PROVENTIL HFA,VENTOLIN HFA) 90 mcg/act inhaler, Inhale, Disp: , Rfl:   •  apixaban (ELIQUIS) 5 mg, Take 5 mg by mouth 2 (two) times a day, Disp: , Rfl:   •  flecainide (TAMBOCOR) 100 mg tablet, Take 100 mg by mouth 2 (two) times a day, Disp: , Rfl:   •  Fluticasone Furoate-Vilanterol 100-25 mcg/actuation inhaler, Inhale 1 puff daily Rinse mouth after use , Disp: , Rfl:   •  losartan (COZAAR) 50 mg tablet, Take 50 mg by mouth daily, Disp: , Rfl:   •  montelukast (SINGULAIR) 10 mg tablet, Take 10 mg by mouth, Disp: , Rfl:   •  temazepam (RESTORIL) 15 mg capsule, Take 15 mg by mouth daily at bedtime as needed for sleep (Patient not taking: Reported on 4/27/2023), Disp: , Rfl:   •  thyroid (ARMOUR) 15 MG tablet, Take by mouth, Disp: , Rfl:   No current facility-administered medications for this visit  Review of Systems   Constitutional: Negative for appetite change, chills, fatigue, fever and unexpected weight change  HENT: Negative for congestion, hearing loss, postnasal drip and sneezing  Respiratory: Negative for cough and shortness of breath  Cardiovascular: Negative for leg swelling  Skin: Positive for wound (LLE)  Negative for rash  Neurological: Negative for numbness  Hematological: Does not bruise/bleed easily  Psychiatric/Behavioral: Negative  Negative for agitation  Objective:  BP (!) 172/81 Comment: ann aware, pt having pain 8/10  Pulse 88   Temp (!) 97 1 °F (36 2 °C)   Resp 18   Pain Score:   8     Physical Exam  Vitals reviewed  Constitutional:       General: She is not in acute distress  Appearance: Normal appearance  She is well-developed and normal weight  She is not ill-appearing, toxic-appearing or diaphoretic  HENT:      Head: Normocephalic and atraumatic  Right Ear: External ear normal       Left Ear: External ear normal    Eyes:      Conjunctiva/sclera: Conjunctivae normal    Cardiovascular:      Rate and Rhythm: Normal rate  Pulmonary:      Effort: Pulmonary effort is normal  No respiratory distress  Musculoskeletal:         General: No deformity  Cervical back: Neck supple  "     Right lower leg: No edema  Left lower leg: No edema  Skin:     General: Skin is warm and dry  Findings: Wound (LLE) present  Comments: Yellow adherent slough on beefy red wound bed with macerated wound edges  See wound assessment   Neurological:      General: No focal deficit present  Mental Status: She is alert and oriented to person, place, and time  Gait: Gait normal    Psychiatric:         Mood and Affect: Mood and affect normal          Behavior: Behavior normal  Behavior is cooperative  Wound 04/27/23 Traumatic Pretibial Left (Active)   Wound Image Images linked 05/11/23 1057   Wound Description Pink;Yellow; Beefy red 05/11/23 1042   Stacia-wound Assessment Pink 05/11/23 1042   Wound Length (cm) 3 1 cm 05/11/23 1042   Wound Width (cm) 2 cm 05/11/23 1042   Wound Depth (cm) 0 1 cm 05/11/23 1042   Wound Surface Area (cm^2) 6 2 cm^2 05/11/23 1042   Wound Volume (cm^3) 0 62 cm^3 05/11/23 1042   Calculated Wound Volume (cm^3) 0 62 cm^3 05/11/23 1042   Change in Wound Size % 31 87 05/11/23 1042   Drainage Amount Moderate 05/11/23 1042   Drainage Description Serosanguineous 05/11/23 1042   Non-staged Wound Description Full thickness 05/11/23 1042   Dressing Status Intact 05/11/23 1042       Debridement   Wound 04/27/23 Traumatic Pretibial Left    Universal Protocol:  Consent: Verbal consent obtained  Written consent obtained  Risks and benefits: risks, benefits and alternatives were discussed  Consent given by: patient  Time out: Immediately prior to procedure a \"time out\" was called to verify the correct patient, procedure, equipment, support staff and site/side marked as required    Patient understanding: patient states understanding of the procedure being performed  Patient identity confirmed: verbally with patient      Performed by: PA  Debridement type: surgical  Level of debridement: subcutaneous tissue  Pain control: lidocaine 4%  Post-debridement measurements  Length " "(cm): 3 1  Width (cm): 2  Depth (cm): 0 2  Percent debrided: 100%  Surface Area (cm^2): 6 2  Area debrided (cm^2): 6 2  Volume (cm^3): 1 24  Tissue and other material debrided: subcutaneous tissue  Devitalized tissue debrided: biofilm, exudate and slough  Instrument(s) utilized: curette  Bleeding: small  Hemostasis obtained with: pressure  Procedural pain (0-10): 0  Post-procedural pain: 0   Response to treatment: procedure was tolerated well                 Wound Instructions:  Orders Placed This Encounter   Procedures   • Wound cleansing and dressings     Left Leg Wound      Wash your hands with soap and water   Remove old dressing, discard into plastic bag and place in trash   Cleanse the wound with prophase 5 minute soak prior to applying a clean dressing  Do not use tissue or cotton balls  Do not scrub the wound  Pat dry using gauze       Shower no sponge bath only for this week      Calmoseptine or Desitin around the wound   Apply polymem max (cut slits in the polymem) to the leg wound   Cover with gauze or abd pad   Secure with joe and tape   Change dressing every other day and as needed     Wear spandigrip E or F for light compression     Standing Status:   Future     Standing Expiration Date:   5/11/2024       José Luis Bales PA-C, [facility] [place]      Portions of the record may have been created with voice recognition software  Occasional wrong word or \"sound alike\" substitutions may have occurred due to the inherent limitations of voice recognition software  Read the chart carefully and recognize, using context, where substitutions have occurred      "

## 2023-05-22 ENCOUNTER — OFFICE VISIT (OUTPATIENT)
Dept: WOUND CARE | Facility: CLINIC | Age: 78
End: 2023-05-22

## 2023-05-22 VITALS
RESPIRATION RATE: 18 BRPM | TEMPERATURE: 96.5 F | HEART RATE: 107 BPM | DIASTOLIC BLOOD PRESSURE: 67 MMHG | SYSTOLIC BLOOD PRESSURE: 146 MMHG

## 2023-05-22 DIAGNOSIS — S81.802A TRAUMATIC OPEN WOUND OF LEFT LOWER LEG, INITIAL ENCOUNTER: Primary | ICD-10-CM

## 2023-05-22 RX ORDER — LIDOCAINE HYDROCHLORIDE 40 MG/ML
5 SOLUTION TOPICAL ONCE
Status: COMPLETED | OUTPATIENT
Start: 2023-05-22 | End: 2023-05-22

## 2023-05-22 RX ADMIN — LIDOCAINE HYDROCHLORIDE 5 ML: 40 SOLUTION TOPICAL at 13:23

## 2023-05-22 NOTE — PATIENT INSTRUCTIONS
Orders Placed This Encounter   Procedures    Wound cleansing and dressings     Left Leg Wound      Wash your hands with soap and water  Remove old dressing, discard into plastic bag and place in trash  Cleanse the wound with prophase 5 minute soak prior to applying a clean dressing  Do not use tissue or cotton balls  Do not scrub the wound  Pat dry using gauze  Shower no sponge bath only for this week      Calmoseptine or Desitin around the wound   Apply polymem max (cut slits in the polymem) to the leg wound    Cover with gauze or abd pad   Secure with joe and tape   Change dressing every other day and as needed     Wear spandigrip E or F for light compression    Dr Ulisses Varela (cardiology)     Standing Status:   Future     Standing Expiration Date:   5/22/2024

## 2023-05-22 NOTE — PROGRESS NOTES
Patient ID: Toby Kim is a 68 y o  female Date of Birth 1945       Chief Complaint   Patient presents with   • Follow Up Wound Care Visit     LLE wound       Allergies:  Codeine    Diagnosis:   Diagnosis ICD-10-CM Associated Orders   1  Traumatic open wound of left lower leg, initial encounter  S81 802A lidocaine (XYLOCAINE) 4 % topical solution 5 mL     Wound cleansing and dressings           Assessment and Plan :  • F/u evaluation of open traumatic wound on LLE with hypergranular tissue slowly healing  Pt with tachycardia today, states she is a little anxious  Recommended she f/u with cardiologist given h/o pacemaker  • Chemically cauterized as below  • Continue wound management with 5 min Prophase soak and Polymem Ag to wound bed with Calmoseptine to wound edges   See wound orders below   • Compression with Tubigrip  • No harsh cleansers such as alcohol, peroxide, or antibacterial soap, do not submerge in water  • Can cleanse with Prophase at dressing changes  • Continue frequent elevation of leg and increase exercise/walking for wound healing  • Followup in 1 week(s) or call sooner with questions or concerns or any signs of infection such as redness, swelling, increased/purulent drainage, fever, chills, increased severe pain      Subjective:   4/27/23: Pt is a 68 y o  female with pmhx Colon Cancer, Melanoma, Hypothyroidism, HTN, asthma, decreased hearing and pacemaker placement on Eliquis who presents for initial eval of LLE open traumatic wound which has been present for about a month after she bumped her leg against the  door  Pt has been cleaning wound with hydrogen peroxide and applying neosporin on the wound bed  Report serosanguinous drainage  No diabetes   No smoking, ETOH or drug use  Pt denies any sob, fatigue, N/V, CP, fever or chills      5/4: Follow-up left lower extremity traumatic wound  No increased pain or drainage    Has been using silver alginate on the wound     5/11: Patient presents for followup evaluation of LLE venous ulcer   Pt states silver alginate dressing has been adhering to wound bed  Pt denies any fever or chills  5/22: Patient presents for followup evaluation of LLE venous ulcer   Pt has been applying Polymem to wound bed and Calmoseptine to wound edges  Pt denies any fever or chills        The following portions of the patient's history were reviewed and updated as appropriate:   Patient Active Problem List   Diagnosis   • History of melanoma   • Hypothyroid   • Primary hypertension   • Mild intermittent asthma without complication   • Presence of permanent cardiac pacemaker   • Hearing impairment   • History of colon cancer     Past Medical History:   Diagnosis Date   • Asthma    • Colon cancer ([facility] [address] )      Past Surgical History:   Procedure Laterality Date   • CARDIAC PACEMAKER PLACEMENT  2020   • COLON SURGERY     • CRANIOTOMY Right 1989    R bleed   • SKIN CANCER EXCISION Right     Melanoma R arm     Family History   Problem Relation Age of Onset   • Heart disease Mother    • COPD Sister    • Emphysema Sister    • Colon cancer Brother    • Liver cancer Paternal Grandfather      Social History     Socioeconomic History   • Marital status: Single     Spouse name: Not on file   • Number of children: Not on file   • Years of education: Not on file   • Highest education level: Not on file   Occupational History   • Not on file   Tobacco Use   • Smoking status: Never   • Smokeless tobacco: Never   Vaping Use   • Vaping Use: Never used   Substance and Sexual Activity   • Alcohol use: Not Currently   • Drug use: Never   • Sexual activity: Not Currently   Other Topics Concern   • Not on file   Social History Narrative    Divorsed        1 child    Hobbies-art    Reg dental care, brushes twice daily     Social Determinants of Health     Financial Resource Strain: Not on file   Food Insecurity: Not on file   Transportation Needs: Not on file Physical Activity: Not on file   Stress: Not on file   Social Connections: Not on file   Intimate Partner Violence: Not on file   Housing Stability: Not on file       Current Outpatient Medications:   •  albuterol (PROVENTIL HFA,VENTOLIN HFA) 90 mcg/act inhaler, Inhale, Disp: , Rfl:   •  apixaban (ELIQUIS) 5 mg, Take 5 mg by mouth 2 (two) times a day, Disp: , Rfl:   •  flecainide (TAMBOCOR) 100 mg tablet, Take 100 mg by mouth 2 (two) times a day, Disp: , Rfl:   •  Fluticasone Furoate-Vilanterol 100-25 mcg/actuation inhaler, Inhale 1 puff daily Rinse mouth after use , Disp: , Rfl:   •  losartan (COZAAR) 50 mg tablet, Take 50 mg by mouth daily, Disp: , Rfl:   •  montelukast (SINGULAIR) 10 mg tablet, Take 10 mg by mouth, Disp: , Rfl:   •  temazepam (RESTORIL) 15 mg capsule, Take 15 mg by mouth daily at bedtime as needed for sleep (Patient not taking: Reported on 4/27/2023), Disp: , Rfl:   •  thyroid (ARMOUR) 15 MG tablet, Take by mouth, Disp: , Rfl:   No current facility-administered medications for this visit  Review of Systems   Constitutional: Negative for appetite change, chills, fatigue, fever and unexpected weight change  HENT: Negative for congestion, hearing loss, postnasal drip and sneezing  Respiratory: Negative for cough and shortness of breath  Cardiovascular: Negative for leg swelling  Skin: Positive for wound (LLE)  Negative for rash  Neurological: Negative for numbness  Hematological: Does not bruise/bleed easily  Psychiatric/Behavioral: Negative  Negative for agitation  Objective:  /67   Pulse (!) 107   Temp (!) 96 5 °F (35 8 °C)   Resp 18   Pain Score: 0-No pain     Physical Exam  Vitals reviewed  Constitutional:       General: She is not in acute distress  Appearance: Normal appearance  She is well-developed and normal weight  She is not ill-appearing, toxic-appearing or diaphoretic  HENT:      Head: Normocephalic and atraumatic        Right Ear: External ear normal       Left Ear: External ear normal    Eyes:      Conjunctiva/sclera: Conjunctivae normal    Cardiovascular:      Rate and Rhythm: Tachycardia present  Pulmonary:      Effort: Pulmonary effort is normal  No respiratory distress  Musculoskeletal:         General: No deformity  Cervical back: Neck supple  Right lower leg: No edema  Left lower leg: No edema  Skin:     General: Skin is warm and dry  Findings: Wound (LLE) present  Comments: Hypergranular tissue on beefy red wound bed  See wound assessment   Neurological:      General: No focal deficit present  Mental Status: She is alert and oriented to person, place, and time  Gait: Gait normal    Psychiatric:         Mood and Affect: Mood and affect normal          Behavior: Behavior normal  Behavior is cooperative  Wound 04/27/23 Traumatic Pretibial Left (Active)   Wound Image Images linked 05/22/23 1321   Wound Description Beefy red;Hypergranulation 05/22/23 1321   Stacia-wound Assessment Fragile 05/22/23 1321   Wound Length (cm) 3 cm 05/22/23 1321   Wound Width (cm) 1 6 cm 05/22/23 1321   Wound Depth (cm) 0 1 cm 05/22/23 1321   Wound Surface Area (cm^2) 4 8 cm^2 05/22/23 1321   Wound Volume (cm^3) 0 48 cm^3 05/22/23 1321   Calculated Wound Volume (cm^3) 0 48 cm^3 05/22/23 1321   Change in Wound Size % 47 25 05/22/23 1321   Drainage Amount Moderate 05/22/23 1321   Drainage Description Sanguineous 05/22/23 1321   Non-staged Wound Description Full thickness 05/22/23 1321   Dressing Status Intact 05/22/23 1321             Chemical Caut Of A Wound     Date/Time 5/22/2023 1:30 PM     Performed by  Waylon Mccormack PA-C     Authorized by Waylon Mccormack PA-C       Associated wounds:   Wound 04/27/23 Traumatic Pretibial Left   Universal Protocol   Consent: Verbal consent obtained  Written consent obtained    Risks and benefits: risks, benefits and alternatives were discussed  Consent given by: patient  Time out: "Immediately prior to procedure a \"time out\" was called to verify the correct patient, procedure, equipment, support staff and site/side marked as required  Patient understanding: patient states understanding of the procedure being performed  Patient identity confirmed: verbally with patient        Local anesthesia used: yes     Anesthesia   Local anesthesia used: yes  Local Anesthetic: topical anesthetic     Sedation   Patient sedated: no        Specimen: no    Culture: no   Procedure Details   Procedure Notes: After permission and placement of topical local anesthetic, 1 Silver nitrate stick(s) were used to cauterize the hypergranular tissue of the open wound  Normal saline was used to neutralize the reaction  A dressing was applied, see orders  Patient tolerated procedure well  Patient tolerance: Patient tolerated the procedure well with no immediate complications                    Wound Instructions:  Orders Placed This Encounter   Procedures   • Wound cleansing and dressings     Left Leg Wound      Wash your hands with soap and water  Michelle Daniel old dressing, discard into plastic bag and place in trash   Cleanse the wound with prophase 5 minute soak prior to applying a clean dressing  Do not use tissue or cotton balls  Do not scrub the wound  Pat dry using gauze       Shower no sponge bath only for this week      Calmoseptine or Desitin around the wound   Apply polymem max (cut slits in the polymem) to the leg wound   Cover with gauze or abd pad   Secure with joe and tape   Change dressing every other day and as needed     Wear spandigrip E or F for light compression    Dr Radha Marin (cardiology)     Standing Status:   Future     Standing Expiration Date:   5/22/2024       Pop Lock PA-C, [facility] [place]      Portions of the record may have been created with voice recognition software   Occasional wrong word or \"sound alike\" substitutions may have occurred due to the inherent limitations of voice recognition " software  Read the chart carefully and recognize, using context, where substitutions have occurred

## 2023-05-25 ENCOUNTER — APPOINTMENT (OUTPATIENT)
Dept: LAB | Facility: HOSPITAL | Age: 78
End: 2023-05-25

## 2023-05-25 DIAGNOSIS — M79.10 MYALGIA: ICD-10-CM

## 2023-05-25 DIAGNOSIS — I10 PRIMARY HYPERTENSION: ICD-10-CM

## 2023-05-25 DIAGNOSIS — E03.9 HYPOTHYROIDISM, UNSPECIFIED TYPE: ICD-10-CM

## 2023-05-25 LAB
25(OH)D3 SERPL-MCNC: 52.6 NG/ML (ref 30–100)
ALBUMIN SERPL BCP-MCNC: 4.6 G/DL (ref 3.5–5)
ALP SERPL-CCNC: 49 U/L (ref 34–104)
ALT SERPL W P-5'-P-CCNC: 19 U/L (ref 7–52)
ANION GAP SERPL CALCULATED.3IONS-SCNC: 6 MMOL/L (ref 4–13)
AST SERPL W P-5'-P-CCNC: 23 U/L (ref 13–39)
BASOPHILS # BLD AUTO: 0.03 THOUSANDS/ÂΜL (ref 0–0.1)
BASOPHILS NFR BLD AUTO: 1 % (ref 0–1)
BILIRUB SERPL-MCNC: 0.6 MG/DL (ref 0.2–1)
BUN SERPL-MCNC: 20 MG/DL (ref 5–25)
CALCIUM SERPL-MCNC: 10 MG/DL (ref 8.4–10.2)
CHLORIDE SERPL-SCNC: 108 MMOL/L (ref 96–108)
CHOLEST SERPL-MCNC: 200 MG/DL
CO2 SERPL-SCNC: 29 MMOL/L (ref 21–32)
CREAT SERPL-MCNC: 0.9 MG/DL (ref 0.6–1.3)
EOSINOPHIL # BLD AUTO: 0.14 THOUSAND/ÂΜL (ref 0–0.61)
EOSINOPHIL NFR BLD AUTO: 2 % (ref 0–6)
ERYTHROCYTE [DISTWIDTH] IN BLOOD BY AUTOMATED COUNT: 13.7 % (ref 11.6–15.1)
GFR SERPL CREATININE-BSD FRML MDRD: 61 ML/MIN/1.73SQ M
GLUCOSE P FAST SERPL-MCNC: 97 MG/DL (ref 65–99)
HCT VFR BLD AUTO: 41.1 % (ref 34.8–46.1)
HDLC SERPL-MCNC: 61 MG/DL
HGB BLD-MCNC: 13.4 G/DL (ref 11.5–15.4)
IMM GRANULOCYTES # BLD AUTO: 0.01 THOUSAND/UL (ref 0–0.2)
IMM GRANULOCYTES NFR BLD AUTO: 0 % (ref 0–2)
LDLC SERPL CALC-MCNC: 109 MG/DL (ref 0–100)
LYMPHOCYTES # BLD AUTO: 1.3 THOUSANDS/ÂΜL (ref 0.6–4.47)
LYMPHOCYTES NFR BLD AUTO: 23 % (ref 14–44)
MCH RBC QN AUTO: 30.2 PG (ref 26.8–34.3)
MCHC RBC AUTO-ENTMCNC: 32.6 G/DL (ref 31.4–37.4)
MCV RBC AUTO: 93 FL (ref 82–98)
MONOCYTES # BLD AUTO: 0.62 THOUSAND/ÂΜL (ref 0.17–1.22)
MONOCYTES NFR BLD AUTO: 11 % (ref 4–12)
NEUTROPHILS # BLD AUTO: 3.65 THOUSANDS/ÂΜL (ref 1.85–7.62)
NEUTS SEG NFR BLD AUTO: 63 % (ref 43–75)
NONHDLC SERPL-MCNC: 139 MG/DL
NRBC BLD AUTO-RTO: 0 /100 WBCS
PLATELET # BLD AUTO: 215 THOUSANDS/UL (ref 149–390)
PMV BLD AUTO: 8.6 FL (ref 8.9–12.7)
POTASSIUM SERPL-SCNC: 4.2 MMOL/L (ref 3.5–5.3)
PROT SERPL-MCNC: 7.1 G/DL (ref 6.4–8.4)
RBC # BLD AUTO: 4.43 MILLION/UL (ref 3.81–5.12)
SODIUM SERPL-SCNC: 143 MMOL/L (ref 135–147)
T4 FREE SERPL-MCNC: 0.83 NG/DL (ref 0.61–1.12)
TRIGL SERPL-MCNC: 148 MG/DL
TSH SERPL DL<=0.05 MIU/L-ACNC: 6.16 UIU/ML (ref 0.45–4.5)
WBC # BLD AUTO: 5.75 THOUSAND/UL (ref 4.31–10.16)

## 2023-05-26 ENCOUNTER — OFFICE VISIT (OUTPATIENT)
Dept: INTERNAL MEDICINE CLINIC | Facility: CLINIC | Age: 78
End: 2023-05-26

## 2023-05-26 VITALS
OXYGEN SATURATION: 97 % | HEART RATE: 77 BPM | TEMPERATURE: 97.5 F | SYSTOLIC BLOOD PRESSURE: 130 MMHG | DIASTOLIC BLOOD PRESSURE: 80 MMHG | BODY MASS INDEX: 23.15 KG/M2 | HEIGHT: 62 IN | WEIGHT: 125.8 LBS | RESPIRATION RATE: 16 BRPM

## 2023-05-26 DIAGNOSIS — I10 PRIMARY HYPERTENSION: ICD-10-CM

## 2023-05-26 DIAGNOSIS — Z12.31 ENCOUNTER FOR SCREENING MAMMOGRAM FOR MALIGNANT NEOPLASM OF BREAST: ICD-10-CM

## 2023-05-26 DIAGNOSIS — Z78.0 POST-MENOPAUSAL: ICD-10-CM

## 2023-05-26 DIAGNOSIS — Z95.0 PRESENCE OF PERMANENT CARDIAC PACEMAKER: ICD-10-CM

## 2023-05-26 DIAGNOSIS — H91.93 BILATERAL HEARING LOSS, UNSPECIFIED HEARING LOSS TYPE: ICD-10-CM

## 2023-05-26 DIAGNOSIS — Z00.00 MEDICARE ANNUAL WELLNESS VISIT, SUBSEQUENT: Primary | ICD-10-CM

## 2023-05-26 DIAGNOSIS — E03.9 HYPOTHYROIDISM, UNSPECIFIED TYPE: ICD-10-CM

## 2023-05-26 DIAGNOSIS — J45.20 MILD INTERMITTENT ASTHMA WITHOUT COMPLICATION: ICD-10-CM

## 2023-05-26 NOTE — PROGRESS NOTES
Assessment and Plan:   Continue current medications at this time  We will find out why the previous orders for consultants have not been answered  We will refer for mammogram, and bone density  Plan on follow-up in 4-month with repeat labs for that visit, follow-up sooner as needed  Continue follow-up with wound care the wound of your left ankle  Problem List Items Addressed This Visit        Endocrine    Hypothyroid    Relevant Orders    T4, free    TSH, 3rd generation    T3, free       Respiratory    Mild intermittent asthma without complication       Cardiovascular and Mediastinum    Primary hypertension    Relevant Orders    Comprehensive metabolic panel       Nervous and Auditory    Hearing impairment       Other    Presence of permanent cardiac pacemaker   Other Visit Diagnoses     Medicare annual wellness visit, subsequent    -  Primary    Encounter for screening mammogram for malignant neoplasm of breast        Relevant Orders    Mammo screening bilateral w 3d & cad    Post-menopausal        Relevant Orders    DXA bone density spine hip and pelvis          Depression Screening and Follow-up Plan: Patient was screened for depression during today's encounter  They screened negative with a PHQ-2 score of 0  Preventive health issues were discussed with patient, and age appropriate screening tests were ordered as noted in patient's After Visit Summary  Personalized health advice and appropriate referrals for health education or preventive services given if needed, as noted in patient's After Visit Summary  History of Present Illness:     Patient presents for a Medicare Wellness Visit  Questionnaire has been reviewed, clarified, and updated with the patient today  Follow-up, labs reviewed with patient    Hypothyroidism: On replacement Bronte Thyroid  Slight elevation in her TSH with low normal free T4  Asymptomatic  Agreed to stay on current dose and reevaluate labs in 4 months      Mild intermittent asthma: On controller inhaler plus albuterol for rescue  No acute exacerbations  Feels her breathing is stable  Hypertension: Controlled on current medication  Geno cp, palp, sob, edema, HA, dizziness, diaphoresis, syncope, visual disturbance  Hearing impairment right ear: Have placed referral for audiology  Records from [place] have come through  Patient known to have aortic stenosis which is moderate  Has permanent pacemaker secondary to syncope and had no further episodes of syncope pacemaker  Had evidence of paroxysmal atrial fibrillation was placed on flecainide and Eliquis  Monitoring after that did not find any evidence of atrial fibrillation  Patient has been referred to cardiology  Patient reports at this time feeling well  She has not heard from any of the referrals, however after looking into this, apparently they have tried to get in touch with her but have a wrong phone number  Patient also informs me she caused a wound to her left ankle at home, it was not healing, she went to the emergency room, received primary treatment and then now has been receiving regular treatment at our wound care center  Wound is slowly healing  Patient Care Team:  Mandy Mancilla MD as PCP - General (Internal Medicine)     Review of Systems:     Review of Systems   Constitutional: Negative for activity change, chills, fatigue and fever  HENT: Negative for congestion  Eyes: Negative for discharge  Respiratory: Negative for cough, chest tightness and shortness of breath  Cardiovascular: Negative for chest pain, palpitations and leg swelling  Gastrointestinal: Negative for abdominal pain, blood in stool, constipation, diarrhea, nausea and vomiting  Endocrine: Negative for polydipsia, polyphagia and polyuria  Genitourinary: Negative for difficulty urinating  Musculoskeletal: Negative for arthralgias and myalgias  Skin: Negative for rash     Allergic/Immunologic: Negative for immunocompromised state  Neurological: Negative for dizziness, syncope, weakness, light-headedness and headaches  Hematological: Negative for adenopathy  Does not bruise/bleed easily  Psychiatric/Behavioral: Negative for dysphoric mood, sleep disturbance and suicidal ideas  The patient is not nervous/anxious           Problem List:     Patient Active Problem List   Diagnosis   • History of melanoma   • Hypothyroid   • Primary hypertension   • Mild intermittent asthma without complication   • Presence of permanent cardiac pacemaker   • Hearing impairment   • History of colon cancer      Past Medical and Surgical History:     Past Medical History:   Diagnosis Date   • Asthma    • Colon cancer ([facility] [address] )      Past Surgical History:   Procedure Laterality Date   • CARDIAC PACEMAKER PLACEMENT  2020   • COLON SURGERY     • CRANIOTOMY Right 1989    R bleed   • SKIN CANCER EXCISION Right     Melanoma R arm      Family History:     Family History   Problem Relation Age of Onset   • Heart disease Mother    • COPD Sister    • Emphysema Sister    • Colon cancer Brother    • Liver cancer Paternal Grandfather       Social History:     Social History     Socioeconomic History   • Marital status: Single     Spouse name: None   • Number of children: None   • Years of education: None   • Highest education level: None   Occupational History   • None   Tobacco Use   • Smoking status: Never   • Smokeless tobacco: Never   Vaping Use   • Vaping Use: Never used   Substance and Sexual Activity   • Alcohol use: Not Currently   • Drug use: Never   • Sexual activity: Not Currently   Other Topics Concern   • None   Social History Narrative    Divorsed        1 child    Hobbies-art    Reg dental care, brushes twice daily     Social Determinants of Health     Financial Resource Strain: Low Risk  (5/26/2023)    Overall Financial Resource Strain (CARDIA)    • Difficulty of Paying Living Expenses: Not hard at all   Food Insecurity: Not on file   Transportation Needs: No Transportation Needs (5/26/2023)    PRAPARE - Transportation    • Lack of Transportation (Medical): No    • Lack of Transportation (Non-Medical): No   Physical Activity: Not on file   Stress: Not on file   Social Connections: Not on file   Intimate Partner Violence: Not on file   Housing Stability: Not on file      Medications and Allergies:     Current Outpatient Medications   Medication Sig Dispense Refill   • albuterol (PROVENTIL HFA,VENTOLIN HFA) 90 mcg/act inhaler Inhale     • apixaban (ELIQUIS) 5 mg Take 5 mg by mouth 2 (two) times a day     • flecainide (TAMBOCOR) 100 mg tablet Take 100 mg by mouth 2 (two) times a day     • Fluticasone Furoate-Vilanterol 100-25 mcg/actuation inhaler Inhale 1 puff daily Rinse mouth after use  • losartan (COZAAR) 50 mg tablet Take 50 mg by mouth daily     • montelukast (SINGULAIR) 10 mg tablet Take 10 mg by mouth     • thyroid (ARMOUR) 15 MG tablet Take by mouth     • temazepam (RESTORIL) 15 mg capsule Take 15 mg by mouth daily at bedtime as needed for sleep (Patient not taking: Reported on 4/27/2023)       No current facility-administered medications for this visit  Allergies   Allergen Reactions   • Codeine Nausea Only      Immunizations:     Immunization History   Administered Date(s) Administered   • Tdap 04/22/2023      Health Maintenance:         Topic Date Due   • Hepatitis C Screening  Never done         Topic Date Due   • COVID-19 Vaccine (1) Never done   • Pneumococcal Vaccine: 65+ Years (1 - PCV) Never done      Medicare Screening Tests and Risk Assessments:     Davina Bonilla is here for her Subsequent Wellness visit  Health Risk Assessment:   Patient rates overall health as very good  Patient feels that their physical health rating is much better  Patient is very satisfied with their life  Eyesight was rated as same  Hearing was rated as same   Patient feels that their emotional and mental health rating is much better  Patients states they are never, rarely angry  Patient states they are never, rarely unusually tired/fatigued  Pain experienced in the last 7 days has been some  Patient's pain rating has been 2/10  Patient states that she has experienced weight loss or gain in last 6 months  Depression Screening:   PHQ-2 Score: 0      Fall Risk Screening: In the past year, patient has experienced: no history of falling in past year      Urinary Incontinence Screening:   Patient has not leaked urine accidently in the last six months  Home Safety:  Patient does not have trouble with stairs inside or outside of their home  Patient has working smoke alarms and has working carbon monoxide detector  Home safety hazards include: none  Nutrition:   Current diet is Low Saturated Fat, Limited junk food and No Added Salt  Medications:   Patient is currently taking over-the-counter supplements  OTC medications include: see medication list  Patient is able to manage medications  Activities of Daily Living (ADLs)/Instrumental Activities of Daily Living (IADLs):   Walk and transfer into and out of bed and chair?: Yes  Dress and groom yourself?: Yes    Bathe or shower yourself?: Yes    Feed yourself?  Yes  Do your laundry/housekeeping?: Yes  Manage your money, pay your bills and track your expenses?: Yes  Make your own meals?: Yes    Do your own shopping?: Yes    Previous Hospitalizations:   Any hospitalizations or ED visits within the last 12 months?: No      Advance Care Planning:   Living will: Yes    Advanced directive: Yes    Five wishes given: Yes    End of Life Decisions reviewed with patient: Yes      Cognitive Screening:   Provider or family/friend/caregiver concerned regarding cognition?: No    PREVENTIVE SCREENINGS      Cardiovascular Screening:    General: Screening Current    Due for: Lipid Panel      Diabetes Screening:     General: Screening Current      Colorectal Cancer Screening:     General: History "Colorectal Cancer    Due for: Colonoscopy - High Risk      Breast Cancer Screening:       Due for: Mammogram        Cervical Cancer Screening:    General: Screening Not Indicated    Due for: Cervical Pap Smear      Osteoporosis Screening:      Due for: Bone Density Ultrasound      Abdominal Aortic Aneurysm (AAA) Screening:        General: Screening Not Indicated      Lung Cancer Screening:     General: Screening Not Indicated      Hepatitis C Screening:    General: Screening Not Indicated    Screening, Brief Intervention, and Referral to Treatment (SBIRT)    Screening  Typical number of drinks in a day: 0  Typical number of drinks in a week: 0  Interpretation: Low risk drinking behavior  AUDIT-C Screenin) How often did you have a drink containing alcohol in the past year? never  2) How many drinks did you have on a typical day when you were drinking in the past year? 0  3) How often did you have 6 or more drinks on one occasion in the past year? never    AUDIT-C Score: 0  Interpretation: Score 0-2 (female): Negative screen for alcohol misuse    Brief Intervention  Alcohol & drug use screenings were reviewed  No concerns regarding substance use disorder identified  No results found  Physical Exam:     /80 (BP Location: Left arm, Patient Position: Sitting)   Pulse 77   Temp 97 5 °F (36 4 °C) (Tympanic)   Resp 16   Ht 5' 2\" (1 575 m)   Wt 57 1 kg (125 lb 12 8 oz)   SpO2 97%   BMI 23 01 kg/m²     Physical Exam  Vitals and nursing note reviewed  Constitutional:       General: She is not in acute distress  Appearance: She is well-developed  She is not ill-appearing  HENT:      Head: Normocephalic and atraumatic  Eyes:      Extraocular Movements: Extraocular movements intact  Conjunctiva/sclera: Conjunctivae normal       Pupils: Pupils are equal, round, and reactive to light  Neck:      Vascular: Carotid bruit (Bilateral bruits probably representing transmitted murmur) present   " Cardiovascular:      Rate and Rhythm: Normal rate and regular rhythm  Heart sounds: Murmur (Grade 2 or 6 systolic murmur best audible in the aortic area, grade 2/6 systolic murmur audible at the apex) heard  Pulmonary:      Effort: Pulmonary effort is normal  No respiratory distress  Breath sounds: Normal breath sounds  Abdominal:      Palpations: Abdomen is soft  Tenderness: There is no abdominal tenderness  Musculoskeletal:         General: No swelling  Cervical back: Normal range of motion and neck supple  Right lower leg: No edema  Left lower leg: No edema  Comments: Wound present left ankle currently covered with new dressing applied this morning  Lymphadenopathy:      Cervical: No cervical adenopathy  Skin:     General: Skin is warm and dry  Capillary Refill: Capillary refill takes less than 2 seconds  Findings: No rash  Neurological:      General: No focal deficit present  Mental Status: She is alert and oriented to person, place, and time  Mental status is at baseline  Psychiatric:         Mood and Affect: Mood normal          Behavior: Behavior normal          Thought Content:  Thought content normal           Ziyad Marx PA-C

## 2023-05-26 NOTE — PATIENT INSTRUCTIONS
Continue current medications at this time  We will find out why the previous orders for consultants have not been answered  We will refer for mammogram, and bone density  Plan on follow-up in 4-month with repeat labs for that visit, follow-up sooner as needed  Continue follow-up with wound care the wound of your left ankle  Medicare Preventive Visit Patient Instructions  Thank you for completing your Welcome to Medicare Visit or Medicare Annual Wellness Visit today  Your next wellness visit will be due in one year (5/26/2024)  The screening/preventive services that you may require over the next 5-10 years are detailed below  Some tests may not apply to you based off risk factors and/or age  Screening tests ordered at today's visit but not completed yet may show as past due  Also, please note that scanned in results may not display below  Preventive Screenings:  Service Recommendations Previous Testing/Comments   Colorectal Cancer Screening  * Colonoscopy    * Fecal Occult Blood Test (FOBT)/Fecal Immunochemical Test (FIT)  * Fecal DNA/Cologuard Test  * Flexible Sigmoidoscopy Age: 39-70 years old   Colonoscopy: every 10 years (may be performed more frequently if at higher risk)  OR  FOBT/FIT: every 1 year  OR  Cologuard: every 3 years  OR  Sigmoidoscopy: every 5 years  Screening may be recommended earlier than age 39 if at higher risk for colorectal cancer  Also, an individualized decision between you and your healthcare provider will decide whether screening between the ages of 74-80 would be appropriate  Colonoscopy: Not on file  FOBT/FIT: Not on file  Cologuard: Not on file  Sigmoidoscopy: Not on file    History Colorectal Cancer     Breast Cancer Screening Age: 36 years old  Frequency: every 1-2 years  Not required if history of left and right mastectomy Mammogram: Not on file        Cervical Cancer Screening Between the ages of 21-29, pap smear recommended once every 3 years     Between the ages of 33-67, can perform pap smear with HPV co-testing every 5 years  Recommendations may differ for women with a history of total hysterectomy, cervical cancer, or abnormal pap smears in past  Pap Smear: Not on file    Screening Not Indicated   Hepatitis C Screening Once for adults born between 1945 and 1965  More frequently in patients at high risk for Hepatitis C Hep C Antibody: Not on file        Diabetes Screening 1-2 times per year if you're at risk for diabetes or have pre-diabetes Fasting glucose: 97 mg/dL (5/25/2023)  A1C: No results in last 5 years (No results in last 5 years)  Screening Current   Cholesterol Screening Once every 5 years if you don't have a lipid disorder  May order more often based on risk factors  Lipid panel: 05/25/2023    Screening Current     Other Preventive Screenings Covered by Medicare:  Abdominal Aortic Aneurysm (AAA) Screening: covered once if your at risk  You're considered to be at risk if you have a family history of AAA  Lung Cancer Screening: covers low dose CT scan once per year if you meet all of the following conditions: (1) Age 50-69; (2) No signs or symptoms of lung cancer; (3) Current smoker or have quit smoking within the last 15 years; (4) You have a tobacco smoking history of at least 20 pack years (packs per day multiplied by number of years you smoked); (5) You get a written order from a healthcare provider    Glaucoma Screening: covered annually if you're considered high risk: (1) You have diabetes OR (2) Family history of glaucoma OR (3)  aged 48 and older OR (3)  American aged 72 and older  Osteoporosis Screening: covered every 2 years if you meet one of the following conditions: (1) You're estrogen deficient and at risk for osteoporosis based off medical history and other findings; (2) Have a vertebral abnormality; (3) On glucocorticoid therapy for more than 3 months; (4) Have primary hyperparathyroidism; (5) On osteoporosis medications and need to assess response to drug therapy  Last bone density test (DXA Scan): Not on file  HIV Screening: covered annually if you're between the age of 12-76  Also covered annually if you are younger than 13 and older than 72 with risk factors for HIV infection  For pregnant patients, it is covered up to 3 times per pregnancy  Immunizations:  Immunization Recommendations   Influenza Vaccine Annual influenza vaccination during flu season is recommended for all persons aged >= 6 months who do not have contraindications   Pneumococcal Vaccine   * Pneumococcal conjugate vaccine = PCV13 (Prevnar 13), PCV15 (Vaxneuvance), PCV20 (Prevnar 20)  * Pneumococcal polysaccharide vaccine = PPSV23 (Pneumovax) Adults 25-60 years old: 1-3 doses may be recommended based on certain risk factors  Adults 72 years old: 1-2 doses may be recommended based off what pneumonia vaccine you previously received   Hepatitis B Vaccine 3 dose series if at intermediate or high risk (ex: diabetes, end stage renal disease, liver disease)   Tetanus (Td) Vaccine - COST NOT COVERED BY MEDICARE PART B Following completion of primary series, a booster dose should be given every 10 years to maintain immunity against tetanus  Td may also be given as tetanus wound prophylaxis  Tdap Vaccine - COST NOT COVERED BY MEDICARE PART B Recommended at least once for all adults  For pregnant patients, recommended with each pregnancy  Shingles Vaccine (Shingrix) - COST NOT COVERED BY MEDICARE PART B  2 shot series recommended in those aged 48 and above     Health Maintenance Due:      Topic Date Due    Hepatitis C Screening  Never done     Immunizations Due:      Topic Date Due    COVID-19 Vaccine (1) Never done    Pneumococcal Vaccine: 65+ Years (1 - PCV) Never done     Advance Directives   What are advance directives? Advance directives are legal documents that state your wishes and plans for medical care   These plans are made ahead of time in case you lose your ability to make decisions for yourself  Advance directives can apply to any medical decision, such as the treatments you want, and if you want to donate organs  What are the types of advance directives? There are many types of advance directives, and each state has rules about how to use them  You may choose a combination of any of the following:  Living will: This is a written record of the treatment you want  You can also choose which treatments you do not want, which to limit, and which to stop at a certain time  This includes surgery, medicine, IV fluid, and tube feedings  Durable power of  for [facility]): This is a written record that states who you want to make healthcare choices for you when you are unable to make them for yourself  This person, called a proxy, is usually a family member or a friend  You may choose more than 1 proxy  Do not resuscitate (DNR) order:  A DNR order is used in case your heart stops beating or you stop breathing  It is a request not to have certain forms of treatment, such as CPR  A DNR order may be included in other types of advance directives  Medical directive: This covers the care that you want if you are in a coma, near death, or unable to make decisions for yourself  You can list the treatments you want for each condition  Treatment may include pain medicine, surgery, blood transfusions, dialysis, IV or tube feedings, and a ventilator (breathing machine)  Values history: This document has questions about your views, beliefs, and how you feel and think about life  This information can help others choose the care that you would choose  Why are advance directives important? An advance directive helps you control your care  Although spoken wishes may be used, it is better to have your wishes written down  Spoken wishes can be misunderstood, or not followed  Treatments may be given even if you do not want them   An advance directive may make it easier for your family to make difficult choices about your care  © Copyright [address] 2018 Information is for End User's use only and may not be sold, redistributed or otherwise used for commercial purposes   All illustrations and images included in CareNotes® are the copyrighted property of A D [employer] M [employer]  or [address] [place] [address]

## 2023-06-05 ENCOUNTER — OFFICE VISIT (OUTPATIENT)
Dept: WOUND CARE | Facility: CLINIC | Age: 78
End: 2023-06-05
Payer: COMMERCIAL

## 2023-06-05 VITALS
DIASTOLIC BLOOD PRESSURE: 92 MMHG | SYSTOLIC BLOOD PRESSURE: 158 MMHG | RESPIRATION RATE: 18 BRPM | TEMPERATURE: 97 F | HEART RATE: 68 BPM

## 2023-06-05 DIAGNOSIS — S81.802A TRAUMATIC OPEN WOUND OF LEFT LOWER LEG, INITIAL ENCOUNTER: Primary | ICD-10-CM

## 2023-06-05 PROCEDURE — 17250 CHEM CAUT OF GRANLTJ TISSUE: CPT | Performed by: ORTHOPAEDIC SURGERY

## 2023-06-05 RX ORDER — LIDOCAINE HYDROCHLORIDE 40 MG/ML
5 SOLUTION TOPICAL ONCE
Status: COMPLETED | OUTPATIENT
Start: 2023-06-05 | End: 2023-06-05

## 2023-06-05 RX ORDER — LIDOCAINE HYDROCHLORIDE 40 MG/ML
5 SOLUTION TOPICAL ONCE
Status: SHIPPED | OUTPATIENT
Start: 2023-06-05

## 2023-06-05 RX ADMIN — LIDOCAINE HYDROCHLORIDE 5 ML: 40 SOLUTION TOPICAL at 10:42

## 2023-06-05 NOTE — PROGRESS NOTES
Patient ID: Felecia Calles is a 68 y o  female Date of Birth 1945       Chief Complaint   Patient presents with   • Follow Up Wound Care Visit     LLE wound       Allergies:  Codeine    Diagnosis:   Diagnosis ICD-10-CM Associated Orders   1  Traumatic open wound of left lower leg, initial encounter  S81 802A lidocaine (XYLOCAINE) 4 % topical solution 5 mL     lidocaine (XYLOCAINE) 4 % topical solution 5 mL     Wound cleansing and dressings           Assessment and Plan :  • F/u evaluation of open traumatic wound on LLE with hypergranular tissue slowly healing  • Chemically cauterized as below  • Continue wound management with 5 min Prophase soak and Polymem to wound bed with Calmoseptine to wound edges   See wound orders below   • Compression with Tubigrip  • No harsh cleansers such as alcohol, peroxide, or antibacterial soap, do not submerge in water  • Can cleanse with Prophase at dressing changes  • Continue frequent elevation of leg and increase exercise/walking for wound healing  • Followup in 1 week(s) or call sooner with questions or concerns or any signs of infection such as redness, swelling, increased/purulent drainage, fever, chills, increased severe pain      Subjective:   4/27/23: Pt is a 68 y o  female with pmhx Colon Cancer, Melanoma, Hypothyroidism, HTN, asthma, decreased hearing and pacemaker placement on Eliquis who presents for initial eval of LLE open traumatic wound which has been present for about a month after she bumped her leg against the  door  Pt has been cleaning wound with hydrogen peroxide and applying neosporin on the wound bed  Report serosanguinous drainage  No diabetes   No smoking, ETOH or drug use  Pt denies any sob, fatigue, N/V, CP, fever or chills      5/4: Follow-up left lower extremity traumatic wound  No increased pain or drainage  Has been using silver alginate on the wound      5/11: Patient presents for followup evaluation of LLE venous ulcer   Pt states silver alginate dressing has been adhering to wound bed  Pt denies any fever or chills  5/22: Patient presents for followup evaluation of LLE venous ulcer   Pt has been applying Polymem to wound bed and Calmoseptine to wound edges  Pt denies any fever or chills  6/5: Patient presents for followup evaluation of LLE venous ulcer   No issues  Pt denies any fever or chills          The following portions of the patient's history were reviewed and updated as appropriate:   Patient Active Problem List   Diagnosis   • History of melanoma   • Hypothyroid   • Primary hypertension   • Mild intermittent asthma without complication   • Presence of permanent cardiac pacemaker   • Hearing impairment   • History of colon cancer     Past Medical History:   Diagnosis Date   • Asthma    • Colon cancer ([facility] [address] )      Past Surgical History:   Procedure Laterality Date   • CARDIAC PACEMAKER PLACEMENT  2020   • COLON SURGERY     • CRANIOTOMY Right 1989    R bleed   • SKIN CANCER EXCISION Right     Melanoma R arm     Family History   Problem Relation Age of Onset   • Heart disease Mother    • COPD Sister    • Emphysema Sister    • Colon cancer Brother    • Liver cancer Paternal Grandfather      Social History     Socioeconomic History   • Marital status: Single     Spouse name: Not on file   • Number of children: Not on file   • Years of education: Not on file   • Highest education level: Not on file   Occupational History   • Not on file   Tobacco Use   • Smoking status: Never   • Smokeless tobacco: Never   Vaping Use   • Vaping Use: Never used   Substance and Sexual Activity   • Alcohol use: Not Currently   • Drug use: Never   • Sexual activity: Not Currently   Other Topics Concern   • Not on file   Social History Narrative    Divorsed        1 child    Hobbies-art    Reg dental care, brushes twice daily     Social Determinants of Health     Financial Resource Strain: Low Risk  (5/26/2023)    Overall Financial Resource Strain (CARDIA)    • Difficulty of Paying Living Expenses: Not hard at all   Food Insecurity: Not on file   Transportation Needs: No Transportation Needs (5/26/2023)    PRAPARE - Transportation    • Lack of Transportation (Medical): No    • Lack of Transportation (Non-Medical): No   Physical Activity: Not on file   Stress: Not on file   Social Connections: Not on file   Intimate Partner Violence: Not on file   Housing Stability: Not on file       Current Outpatient Medications:   •  albuterol (PROVENTIL HFA,VENTOLIN HFA) 90 mcg/act inhaler, Inhale, Disp: , Rfl:   •  apixaban (ELIQUIS) 5 mg, Take 5 mg by mouth 2 (two) times a day, Disp: , Rfl:   •  flecainide (TAMBOCOR) 100 mg tablet, Take 100 mg by mouth 2 (two) times a day, Disp: , Rfl:   •  Fluticasone Furoate-Vilanterol 100-25 mcg/actuation inhaler, Inhale 1 puff daily Rinse mouth after use , Disp: , Rfl:   •  losartan (COZAAR) 50 mg tablet, Take 50 mg by mouth daily, Disp: , Rfl:   •  montelukast (SINGULAIR) 10 mg tablet, Take 10 mg by mouth, Disp: , Rfl:   •  temazepam (RESTORIL) 15 mg capsule, Take 15 mg by mouth daily at bedtime as needed for sleep (Patient not taking: Reported on 4/27/2023), Disp: , Rfl:   •  thyroid (ARMOUR) 15 MG tablet, Take by mouth, Disp: , Rfl:     Current Facility-Administered Medications:   •  lidocaine (XYLOCAINE) 4 % topical solution 5 mL, 5 mL, Topical, Once, Teaa July, SAMY    Review of Systems   Constitutional: Negative for appetite change, chills, fatigue, fever and unexpected weight change  HENT: Negative for congestion, hearing loss, postnasal drip and sneezing  Respiratory: Negative for cough and shortness of breath  Cardiovascular: Negative for leg swelling  Skin: Positive for wound (LLE)  Negative for rash  Neurological: Negative for numbness  Hematological: Does not bruise/bleed easily  Psychiatric/Behavioral: Negative  Negative for agitation           Objective:  /92   Pulse 68   Temp (!) 97 °F (36 1 °C)   Resp 18   Pain Score: 0-No pain     Physical Exam  Vitals reviewed  Constitutional:       General: She is not in acute distress  Appearance: Normal appearance  She is well-developed and normal weight  She is not ill-appearing, toxic-appearing or diaphoretic  HENT:      Head: Normocephalic and atraumatic  Right Ear: External ear normal       Left Ear: External ear normal    Eyes:      Conjunctiva/sclera: Conjunctivae normal    Pulmonary:      Effort: Pulmonary effort is normal  No respiratory distress  Musculoskeletal:         General: No deformity  Cervical back: Neck supple  Right lower leg: No edema  Left lower leg: No edema  Skin:     General: Skin is warm and dry  Findings: Wound (LLE) present  Comments: Hypergranular tissue on beefy red wound bed  See wound assessment   Neurological:      General: No focal deficit present  Mental Status: She is alert and oriented to person, place, and time  Gait: Gait normal    Psychiatric:         Mood and Affect: Mood and affect normal          Behavior: Behavior normal  Behavior is cooperative               Wound 04/27/23 Traumatic Pretibial Left (Active)   Wound Image Images linked 06/05/23 1039   Wound Description Beefy red;Hypergranulation 06/05/23 1039   Stacia-wound Assessment Fragile 06/05/23 1039   Wound Length (cm) 0 9 cm 06/05/23 1039   Wound Width (cm) 1 cm 06/05/23 1039   Wound Depth (cm) 0 1 cm 06/05/23 1039   Wound Surface Area (cm^2) 0 9 cm^2 06/05/23 1039   Wound Volume (cm^3) 0 09 cm^3 06/05/23 1039   Calculated Wound Volume (cm^3) 0 09 cm^3 06/05/23 1039   Change in Wound Size % 90 11 06/05/23 1039   Drainage Amount Small 06/05/23 1039   Drainage Description Serosanguineous 06/05/23 1039   Non-staged Wound Description Full thickness 06/05/23 1039   Dressing Status Intact 06/05/23 1039             Chemical Caut Of A Wound     Date/Time 6/5/2023 11:00 AM     Performed by  Nasima Partida "SAMY   Authorized by Odessa Martinez PA-C      Associated wounds:   Wound 04/27/23 Traumatic Pretibial Left   Universal Protocol   Consent: Verbal consent obtained  Written consent obtained  Risks and benefits: risks, benefits and alternatives were discussed  Consent given by: patient  Time out: Immediately prior to procedure a \"time out\" was called to verify the correct patient, procedure, equipment, support staff and site/side marked as required  Patient understanding: patient states understanding of the procedure being performed  Patient identity confirmed: verbally with patient        Local anesthesia used: yes     Anesthesia   Local anesthesia used: yes  Local Anesthetic: topical anesthetic     Sedation   Patient sedated: no        Specimen: no    Culture: no   Procedure Details   Procedure Notes: After permission and placement of topical local anesthetic, 1 Silver nitrate stick(s) were used to cauterize the hypergranular tissue of the open wound  Normal saline was used to neutralize the reaction  A dressing was applied, see orders  Patient tolerated procedure well  Patient tolerance: Patient tolerated the procedure well with no immediate complications                  Wound Instructions:  Orders Placed This Encounter   Procedures   • Wound cleansing and dressings     Left Leg Wound      Wash your hands with soap and water  Brianna Poster old dressing, discard into plastic bag and place in trash   Cleanse the wound with prophase 5 minute soak prior to applying a clean dressing  Do not use tissue or cotton balls  Do not scrub the wound   Pat dry using gauze       Shower no sponge bath only for this week      Calmoseptine or Desitin around the wound   Apply polymem max (cut slits in the polymem) to the leg wound   Cover with gauze or abd pad   Secure with joe and tape   Change dressing every other day and as needed     Wear spandigrip E or F for light compression     Dr Laura Evans (cardiology)     Standing Status: " "  Future     Standing Expiration Date:   6/5/2024       Osvaldo Hogan PA-C, [facility]      Portions of the record may have been created with voice recognition software  Occasional wrong word or \"sound alike\" substitutions may have occurred due to the inherent limitations of voice recognition software  Read the chart carefully and recognize, using context, where substitutions have occurred      "

## 2023-06-05 NOTE — PATIENT INSTRUCTIONS
Orders Placed This Encounter   Procedures    Wound cleansing and dressings     Left Leg Wound      Wash your hands with soap and water  Remove old dressing, discard into plastic bag and place in trash  Cleanse the wound with prophase 5 minute soak prior to applying a clean dressing  Do not use tissue or cotton balls  Do not scrub the wound  Pat dry using gauze  Shower no sponge bath only for this week      Calmoseptine or Desitin around the wound   Apply polymem max (cut slits in the polymem) to the leg wound    Cover with gauze or abd pad   Secure with joe and tape   Change dressing every other day and as needed     Wear spandigrip E or F for light compression     Dr John Balbuena (cardiology)     Standing Status:   Future     Standing Expiration Date:   6/5/2024

## 2023-12-12 ENCOUNTER — RA CDI HCC (OUTPATIENT)
Dept: OTHER | Facility: HOSPITAL | Age: 78
End: 2023-12-12

## 2023-12-12 NOTE — PROGRESS NOTES
[address] [facility] coding opportunities       Chart reviewed, no opportunity found: [id number]        Patients Insurance     Medicare Insurance: [employer]age

## 2023-12-14 ENCOUNTER — OFFICE VISIT (OUTPATIENT)
Dept: INTERNAL MEDICINE CLINIC | Facility: CLINIC | Age: 78
End: 2023-12-14
Payer: COMMERCIAL

## 2023-12-14 VITALS
RESPIRATION RATE: 18 BRPM | HEART RATE: 60 BPM | SYSTOLIC BLOOD PRESSURE: 134 MMHG | DIASTOLIC BLOOD PRESSURE: 82 MMHG | OXYGEN SATURATION: 96 % | WEIGHT: 122 LBS | BODY MASS INDEX: 22.45 KG/M2 | HEIGHT: 62 IN

## 2023-12-14 DIAGNOSIS — Z95.0 PRESENCE OF PERMANENT CARDIAC PACEMAKER: ICD-10-CM

## 2023-12-14 DIAGNOSIS — I10 PRIMARY HYPERTENSION: Primary | ICD-10-CM

## 2023-12-14 DIAGNOSIS — E03.9 HYPOTHYROIDISM, UNSPECIFIED TYPE: ICD-10-CM

## 2023-12-14 DIAGNOSIS — Z13.6 SCREENING FOR HEART DISEASE: ICD-10-CM

## 2023-12-14 DIAGNOSIS — R09.89 BILATERAL CAROTID BRUITS: ICD-10-CM

## 2023-12-14 DIAGNOSIS — R41.3 MEMORY LOSS: ICD-10-CM

## 2023-12-14 PROCEDURE — 99214 OFFICE O/P EST MOD 30 MIN: CPT | Performed by: PHYSICIAN ASSISTANT

## 2023-12-14 NOTE — PROGRESS NOTES
Assessment/Plan:   Patient Instructions   Plan will be to catch up with specialty such as gynecology, dermatology, gastroenterology, audiology. Will refer you to have MRI of the brain done due to your progressive memory changes, and strong family history of dementia. Follow-up with cardiology next week. Do labs prior to that visit. Schedule follow-up here in 1 month, sooner as needed. No medication changes at this time. Quality Measures:   Depression Screening and Follow-up Plan: Patient was screened for depression during today's encounter. They screened negative with a PHQ-2 score of 0. Return in about 4 weeks (around 1/11/2024) for Next scheduled follow up. Diagnoses and all orders for this visit:    Primary hypertension    Hypothyroidism, unspecified type    Presence of permanent cardiac pacemaker    Screening for heart disease  -     Lipid panel; Future    Memory loss  -     MRI brain NeuroQuant wo and w contrast; Future  -     VAS carotid complete study; Future    Bilateral carotid bruits  -     VAS carotid complete study; Future          Subjective:      Patient ID: Bryce Boyd is a 66 y.o. female. Follow-up, patient present with her brother who she is currently living with. At last visit patient had moved from [place], was living independently, however family members have noted a decline in her memory and that she was not carrying out her medical follow-ups. Brother who is a patient here indicates that there is a strong family history of dementia in the family and has concerns. Patient feels a great deal of her behavior is related to not wearing her hearing aids therefore not paying attention to conversation and what is going around her.   At last visit patient was referred to GI secondary to 2 strong family history of colon cancer and personal history of colon cancer, she was referred to cardiology (has an appointment next week) patient has a permanent pacemaker, she was also referred to gynecology, and dermatology all of which she has not seen. Some records from [place] have been obtained and are in the EMR. Patient also has history of hypothyroidism for which she is on [facility], hypertension for which she is on losartan. Patient is also on flecainide and apixaban for her heart disease. Patient reports her asthma has been stable. Geno cp, palp, sob, edema, HA, dizziness, diaphoresis, syncope, visual disturbance.         ALLERGIES:  Allergies   Allergen Reactions   • Codeine Nausea Only       CURRENT MEDICATIONS:    Current Outpatient Medications:   •  albuterol (PROVENTIL HFA,VENTOLIN HFA) 90 mcg/act inhaler, Inhale, Disp: , Rfl:   •  apixaban (ELIQUIS) 5 mg, Take 5 mg by mouth 2 (two) times a day, Disp: , Rfl:   •  flecainide (TAMBOCOR) 100 mg tablet, Take 100 mg by mouth 2 (two) times a day, Disp: , Rfl:   •  Fluticasone Furoate-Vilanterol 100-25 mcg/actuation inhaler, Inhale 1 puff daily Rinse mouth after use., Disp: , Rfl:   •  losartan (COZAAR) 50 mg tablet, Take 50 mg by mouth daily, Disp: , Rfl:   •  montelukast (SINGULAIR) 10 mg tablet, Take 10 mg by mouth, Disp: , Rfl:   •  thyroid (ARMOUR) 15 MG tablet, Take by mouth, Disp: , Rfl:   •  temazepam (RESTORIL) 15 mg capsule, Take 15 mg by mouth daily at bedtime as needed for sleep (Patient not taking: Reported on 4/27/2023), Disp: , Rfl:     Current Facility-Administered Medications:   •  lidocaine (XYLOCAINE) 4 % topical solution 5 mL, 5 mL, Topical, Once, Vibha Henry PA-C    ACTIVE PROBLEM LIST:  Patient Active Problem List   Diagnosis   • History of melanoma   • Hypothyroid   • Primary hypertension   • Mild intermittent asthma without complication   • Presence of permanent cardiac pacemaker   • Hearing impairment   • History of colon cancer       PAST MEDICAL HISTORY:  Past Medical History:   Diagnosis Date   • Asthma    • Colon cancer ([address])        PAST SURGICAL HISTORY:  Past Surgical History:   Procedure Laterality Date   • CARDIAC PACEMAKER PLACEMENT  2020   • COLON SURGERY     • CRANIOTOMY Right 1989    R bleed   • SKIN CANCER EXCISION Right     Melanoma R arm       FAMILY HISTORY:  Family History   Problem Relation Age of Onset   • Heart disease Mother    • COPD Sister    • Emphysema Sister    • Colon cancer Brother    • Liver cancer Paternal Grandfather        SOCIAL HISTORY:  Social History     Socioeconomic History   • Marital status: Single     Spouse name: Not on file   • Number of children: Not on file   • Years of education: Not on file   • Highest education level: Not on file   Occupational History   • Not on file   Tobacco Use   • Smoking status: Never   • Smokeless tobacco: Never   Vaping Use   • Vaping status: Never Used   Substance and Sexual Activity   • Alcohol use: Not Currently   • Drug use: Never   • Sexual activity: Not Currently   Other Topics Concern   • Not on file   Social History Narrative    Divorsed        1 child    Hobbies-art    Reg dental care, brushes twice daily     Social Determinants of Health     Financial Resource Strain: Low Risk  (5/26/2023)    Overall Financial Resource Strain (CARDIA)    • Difficulty of Paying Living Expenses: Not hard at all   Food Insecurity: Not on file   Transportation Needs: No Transportation Needs (5/26/2023)    PRAPARE - Transportation    • Lack of Transportation (Medical): No    • Lack of Transportation (Non-Medical): No   Physical Activity: Not on file   Stress: Not on file   Social Connections: Not on file   Intimate Partner Violence: Not on file   Housing Stability: Not on file       Review of Systems   Constitutional:  Negative for activity change, chills, fatigue and fever. HENT:  Negative for congestion. Eyes:  Negative for discharge and visual disturbance. Respiratory:  Negative for cough, chest tightness and shortness of breath. Cardiovascular:  Negative for chest pain, palpitations and leg swelling.    Gastrointestinal: Negative for abdominal pain, blood in stool, constipation, diarrhea, nausea and vomiting. Endocrine: Negative for polydipsia, polyphagia and polyuria. Genitourinary:  Negative for difficulty urinating. Musculoskeletal:  Negative for arthralgias and myalgias. Skin:  Negative for rash. Allergic/Immunologic: Negative for immunocompromised state. Neurological:  Negative for dizziness, syncope, weakness, light-headedness and headaches. Hematological:  Negative for adenopathy. Does not bruise/bleed easily. Psychiatric/Behavioral:  Negative for dysphoric mood, sleep disturbance and suicidal ideas. The patient is not nervous/anxious. Objective:  Vitals:    12/14/23 1459   BP: 134/82   BP Location: Left arm   Patient Position: Sitting   Cuff Size: Adult   Pulse: 60   Resp: 18   SpO2: 96%   Weight: 55.3 kg (122 lb)   Height: 5' 2" (1.575 m)     Body mass index is 22.31 kg/m². Physical Exam  Vitals and nursing note reviewed. Constitutional:       General: She is not in acute distress. Appearance: She is well-developed. She is not ill-appearing. HENT:      Head: Normocephalic and atraumatic. Eyes:      Extraocular Movements: Extraocular movements intact. Pupils: Pupils are equal, round, and reactive to light. Neck:      Thyroid: No thyromegaly. Vascular: Carotid bruit (Bilateral) present. No JVD. Cardiovascular:      Rate and Rhythm: Normal rate. Rhythm irregularly irregular. Heart sounds: Murmur (Grade 2/6 systolic murmur best audible at the apex and also in the aortic area) heard. Pulmonary:      Effort: Pulmonary effort is normal. No respiratory distress. Breath sounds: Normal breath sounds. Abdominal:      Tenderness: There is no abdominal tenderness. Musculoskeletal:      Cervical back: Neck supple. Right lower leg: No edema. Left lower leg: No edema. Lymphadenopathy:      Cervical: No cervical adenopathy.    Skin:     General: Skin is warm and dry. Findings: No rash. Neurological:      General: No focal deficit present. Mental Status: She is alert and oriented to person, place, and time. Mental status is at baseline. Psychiatric:         Mood and Affect: Mood normal.         Behavior: Behavior normal.           RESULTS:  Cholesterol   Date/Time Value Ref Range Status   05/25/2023 09:42  (H) See Comment mg/dL Final     Comment:     Cholesterol:         Pediatric <18 Years        Desirable          <170 mg/dL      Borderline High    170-199 mg/dL      High               >=200 mg/dL        Adult >=18 Years            Desirable        <200 mg/dL      Borderline High  200-239 mg/dL      High             >239 mg/dL       Triglycerides   Date/Time Value Ref Range Status   05/25/2023 09:42  See Comment mg/dL Final     Comment:     Triglyceride:     0-9Y            <75mg/dL     10Y-17Y         <90 mg/dL       >=18Y     Normal          <150 mg/dL     Borderline High 150-199 mg/dL     High            200-499 mg/dL        Very High       >499 mg/dL    Specimen collection should occur prior to N-Acetylcysteine or Metamizole administration due to the potential for falsely depressed results. HDL, Direct   Date/Time Value Ref Range Status   05/25/2023 09:42 AM 61 >=50 mg/dL Final     LDL Calculated   Date/Time Value Ref Range Status   05/25/2023 09:42  (H) 0 - 100 mg/dL Final     Comment:     LDL Cholesterol:     Optimal           <100 mg/dl     Near Optimal      100-129 mg/dl     Above Optimal       Borderline High 130-159 mg/dl       High            160-189 mg/dl       Very High       >189 mg/dl         This screening LDL is a calculated result. It does not have the accuracy of the Direct Measured LDL in the monitoring of patients with hyperlipidemia and/or statin therapy. Direct Measure LDL (AKO126) must be ordered separately in these patients.      Hemoglobin   Date/Time Value Ref Range Status   05/25/2023 09:42 AM 13.4 11.5 - 15.4 g/dL Final     Hematocrit   Date/Time Value Ref Range Status   05/25/2023 09:42 AM 41.1 34.8 - 46.1 % Final     Platelets   Date/Time Value Ref Range Status   05/25/2023 09:42  149 - 390 Thousands/uL Final     TSH 3RD GENERATON   Date/Time Value Ref Range Status   05/25/2023 09:42 AM 6.163 (H) 0.450 - 4.500 uIU/mL Final     Comment:     The recommended reference ranges for TSH during pregnancy are as follows:   First trimester 0.1 to 2.5 uIU/mL   Second trimester  0.2 to 3.0 uIU/mL   Third trimester 0.3 to 3.0 uIU/m    Note: Normal ranges may not apply to patients who are transgender, non-binary, or whose legal sex, sex at birth, and gender identity differ. Adult TSH (3rd generation) reference range follows the recommended guidelines of the American Thyroid Association, January, 2020. Free T4   Date/Time Value Ref Range Status   05/25/2023 09:42 AM 0.83 0.61 - 1.12 ng/dL Final     Comment:     Specimens with biotin concentrations > 10 ng/mL can lead to significant (> 10%) positive bias in result. Sodium   Date/Time Value Ref Range Status   05/25/2023 09:42  135 - 147 mmol/L Final     BUN   Date/Time Value Ref Range Status   05/25/2023 09:42 AM 20 5 - 25 mg/dL Final     Creatinine   Date/Time Value Ref Range Status   05/25/2023 09:42 AM 0.90 0.60 - 1.30 mg/dL Final     Comment:     Standardized to IDMS reference method      In chart    This note was created with voice recognition software. Phonic, grammatical and spelling errors may be present within the note as a result.

## 2023-12-14 NOTE — PATIENT INSTRUCTIONS
Plan will be to catch up with specialty such as gynecology, dermatology, gastroenterology, audiology. Will refer you to have MRI of the brain done due to your progressive memory changes, and strong family history of dementia. Follow-up with cardiology next week. Do labs prior to that visit. Schedule follow-up here in 1 month, sooner as needed. No medication changes at this time.

## 2023-12-15 ENCOUNTER — APPOINTMENT (OUTPATIENT)
Dept: LAB | Facility: HOSPITAL | Age: 78
End: 2023-12-15
Payer: COMMERCIAL

## 2023-12-15 DIAGNOSIS — I10 PRIMARY HYPERTENSION: ICD-10-CM

## 2023-12-15 DIAGNOSIS — E03.9 HYPOTHYROIDISM, UNSPECIFIED TYPE: ICD-10-CM

## 2023-12-15 DIAGNOSIS — Z13.6 SCREENING FOR HEART DISEASE: ICD-10-CM

## 2023-12-15 LAB
ALBUMIN SERPL BCP-MCNC: 4.5 G/DL (ref 3.5–5)
ALP SERPL-CCNC: 62 U/L (ref 34–104)
ALT SERPL W P-5'-P-CCNC: 10 U/L (ref 7–52)
ANION GAP SERPL CALCULATED.3IONS-SCNC: 15 MMOL/L
AST SERPL W P-5'-P-CCNC: 21 U/L (ref 13–39)
BILIRUB SERPL-MCNC: 0.75 MG/DL (ref 0.2–1)
BUN SERPL-MCNC: 21 MG/DL (ref 5–25)
CALCIUM SERPL-MCNC: 10.1 MG/DL (ref 8.4–10.2)
CHLORIDE SERPL-SCNC: 108 MMOL/L (ref 96–108)
CHOLEST SERPL-MCNC: 192 MG/DL
CO2 SERPL-SCNC: 20 MMOL/L (ref 21–32)
CREAT SERPL-MCNC: 1.03 MG/DL (ref 0.6–1.3)
GFR SERPL CREATININE-BSD FRML MDRD: 52 ML/MIN/1.73SQ M
GLUCOSE P FAST SERPL-MCNC: 109 MG/DL (ref 65–99)
HDLC SERPL-MCNC: 58 MG/DL
LDLC SERPL CALC-MCNC: 119 MG/DL (ref 0–100)
NONHDLC SERPL-MCNC: 134 MG/DL
POTASSIUM SERPL-SCNC: 3.5 MMOL/L (ref 3.5–5.3)
PROT SERPL-MCNC: 7.4 G/DL (ref 6.4–8.4)
SODIUM SERPL-SCNC: 143 MMOL/L (ref 135–147)
T3FREE SERPL-MCNC: 3.11 PG/ML (ref 2.5–3.9)
T4 FREE SERPL-MCNC: 1.14 NG/DL (ref 0.61–1.12)
TRIGL SERPL-MCNC: 77 MG/DL
TSH SERPL DL<=0.05 MIU/L-ACNC: 3.66 UIU/ML (ref 0.45–4.5)

## 2023-12-15 PROCEDURE — 84439 ASSAY OF FREE THYROXINE: CPT

## 2023-12-15 PROCEDURE — 80053 COMPREHEN METABOLIC PANEL: CPT

## 2023-12-15 PROCEDURE — 36415 COLL VENOUS BLD VENIPUNCTURE: CPT

## 2023-12-15 PROCEDURE — 80061 LIPID PANEL: CPT

## 2023-12-15 PROCEDURE — 84443 ASSAY THYROID STIM HORMONE: CPT

## 2023-12-15 PROCEDURE — 84481 FREE ASSAY (FT-3): CPT

## 2023-12-20 ENCOUNTER — CONSULT (OUTPATIENT)
Dept: CARDIOLOGY CLINIC | Facility: CLINIC | Age: 78
End: 2023-12-20
Payer: COMMERCIAL

## 2023-12-20 VITALS
WEIGHT: 124 LBS | OXYGEN SATURATION: 96 % | DIASTOLIC BLOOD PRESSURE: 92 MMHG | SYSTOLIC BLOOD PRESSURE: 160 MMHG | HEIGHT: 62 IN | HEART RATE: 71 BPM | RESPIRATION RATE: 18 BRPM | BODY MASS INDEX: 22.82 KG/M2

## 2023-12-20 DIAGNOSIS — Z95.0 PRESENCE OF PERMANENT CARDIAC PACEMAKER: ICD-10-CM

## 2023-12-20 DIAGNOSIS — I35.0 MODERATE AORTIC VALVE STENOSIS: ICD-10-CM

## 2023-12-20 DIAGNOSIS — I10 PRIMARY HYPERTENSION: Primary | ICD-10-CM

## 2023-12-20 PROCEDURE — 99204 OFFICE O/P NEW MOD 45 MIN: CPT | Performed by: INTERNAL MEDICINE

## 2023-12-20 PROCEDURE — 93000 ELECTROCARDIOGRAM COMPLETE: CPT | Performed by: INTERNAL MEDICINE

## 2023-12-20 RX ORDER — METOPROLOL SUCCINATE 25 MG/1
25 TABLET, EXTENDED RELEASE ORAL DAILY
Qty: 30 TABLET | Refills: 3 | Status: SHIPPED | OUTPATIENT
Start: 2023-12-20

## 2023-12-20 NOTE — PROGRESS NOTES
OP Consultation - Cardiology    Glendy Pete 78 y.o. female MRN: [id number]    Physician Requesting Consult: AMENA Molina    Reason for Consult / Chief Complaint: Establish care    HPI:   78 year old presents to [facility] care    Patient moved from [place]. She has a history of syncope sp pacemaker placement, hypertension, Colon caner in 1990s and hypothyroidism.   She is on apixaban and flecainide. Also on losartan.     Based on scanned echo report(dated 1/2022) she has preserved LV function, G1DD, and moderate AS  She has a [employer] pacemaker    Denies chest pain, chest pressure, shortness of breath, dizziness, lightheadedness, presyncope or syncope.  Denies orthopnea, PND or lower limb edema.    Family has concern for dementia. Patient think it might be hearing loss    Social History:  Tobacco: Never  Alcohol: Denies  Living with her brother      FAMILY HISTORY:  Family History   Problem Relation Age of Onset    Heart disease Mother     COPD Sister     Emphysema Sister     Colon cancer Brother     Liver cancer Paternal Grandfather         MEDS & ALLERGIES:  Allergies   Allergen Reactions    Codeine Nausea Only         Current Outpatient Medications:     albuterol (PROVENTIL HFA,VENTOLIN HFA) 90 mcg/act inhaler, Inhale, Disp: , Rfl:     apixaban (ELIQUIS) 5 mg, Take 5 mg by mouth 2 (two) times a day, Disp: , Rfl:     flecainide (TAMBOCOR) 100 mg tablet, Take 100 mg by mouth 2 (two) times a day, Disp: , Rfl:     Fluticasone Furoate-Vilanterol 100-25 mcg/actuation inhaler, Inhale 1 puff daily Rinse mouth after use., Disp: , Rfl:     losartan (COZAAR) 50 mg tablet, Take 50 mg by mouth daily, Disp: , Rfl:     montelukast (SINGULAIR) 10 mg tablet, Take 10 mg by mouth, Disp: , Rfl:     temazepam (RESTORIL) 15 mg capsule, Take 15 mg by mouth daily at bedtime as needed for sleep (Patient not taking: Reported on 4/27/2023), Disp: , Rfl:     thyroid (ARMOUR) 15 MG tablet, Take by mouth, Disp: , Rfl:     Current  Facility-Administered Medications:     lidocaine (XYLOCAINE) 4 % topical solution 5 mL, 5 mL, Topical, Once, Geno Baez PA-C    Past Medical History:   Diagnosis Date    Asthma     Colon cancer (HCC)          Review of Systems:  Review of Systems   Constitutional:  Negative for activity change, appetite change, diaphoresis, fatigue and fever.   Respiratory:  Negative for chest tightness and shortness of breath.    Cardiovascular:  Negative for chest pain, palpitations and leg swelling.   Gastrointestinal:  Negative for nausea and vomiting.   Genitourinary:  Negative for difficulty urinating.   Musculoskeletal:  Negative for arthralgias and back pain.   Skin:  Negative for color change and pallor.   Neurological:  Negative for dizziness, syncope, weakness and light-headedness.   Hematological:  Negative for adenopathy.   Psychiatric/Behavioral:  Negative for agitation.    All other systems reviewed and are negative.      PHYSICAL EXAM:  Vitals:   There were no vitals filed for this visit.     Physical Exam:  Physical Exam  Vitals and nursing note reviewed.   Constitutional:       General: She is not in acute distress.     Appearance: Normal appearance. She is not ill-appearing or diaphoretic.   HENT:      Head: Normocephalic and atraumatic.   Eyes:      Extraocular Movements: Extraocular movements intact.   Cardiovascular:      Rate and Rhythm: Normal rate and regular rhythm.      Heart sounds: No murmur heard.     No friction rub. No gallop.   Pulmonary:      Effort: Pulmonary effort is normal. No respiratory distress.      Breath sounds: Normal breath sounds.   Abdominal:      General: There is no distension.      Palpations: Abdomen is soft.   Musculoskeletal:         General: No swelling. Normal range of motion.      Cervical back: Normal range of motion.   Skin:     General: Skin is warm and dry.      Capillary Refill: Capillary refill takes less than 2 seconds.      Coloration: Skin is not pale.  "  Neurological:      General: No focal deficit present.      Mental Status: She is alert and oriented to person, place, and time.      Cranial Nerves: No cranial nerve deficit.   Psychiatric:         Mood and Affect: Mood normal.         Behavior: Behavior normal.         LABORATORY RESULTS:    Lab Results   Component Value Date    WBC 5.75 05/25/2023    HGB 13.4 05/25/2023    HCT 41.1 05/25/2023    MCV 93 05/25/2023     05/25/2023     Lab Results   Component Value Date    CALCIUM 10.1 12/15/2023    K 3.5 12/15/2023    CO2 20 (L) 12/15/2023     12/15/2023    BUN 21 12/15/2023    CREATININE 1.03 12/15/2023     No results found for: \"HGBA1C\"    Lipid Profile:   No results found for: \"CHOL\"  Lab Results   Component Value Date    HDL 58 12/15/2023    HDL 61 05/25/2023     Lab Results   Component Value Date    LDLCALC 119 (H) 12/15/2023    LDLCALC 109 (H) 05/25/2023     Lab Results   Component Value Date    TRIG 77 12/15/2023    TRIG 148 05/25/2023       The 10-year ASCVD risk score (Althea MILIAN, et al., 2019) is: 30.8%    Values used to calculate the score:      Age: 78 years      Sex: Female      Is Non- : No      Diabetic: No      Tobacco smoker: No      Systolic Blood Pressure: 134 mmHg      Is BP treated: Yes      HDL Cholesterol: 58 mg/dL      Total Cholesterol: 192 mg/dL    Imaging: I have personally reviewed pertinent reports.    No results found.    EKG reviewed personally: NSR    Assessment and Plan:    Glendy was seen today for consult.    Diagnoses and all orders for this visit:    Presence of permanent cardiac pacemaker  -     Ambulatory referral to Cardiology           Syncope s/p pacemaker  PAF  Moderate AS  Hypertension    Will establish with our device clinic. She has a [employer] Pacer. Last interrogation was beginning of this year  Message sent to device clinic    PAF:  Will stop flecainide given age and moderate AS and potential side effects  Start low dose metoprolol " succinate  Discussed with patient and she is agreeable    Hypertension:  BP elevated. On losartan  Added metoprolol as above  Recommend low salt DASH diet    Medication indication and side effects were discussed in detail.     She will inform us with onset of cardiac symptoms    Return to clinic in 6 months or LINCOLN Schaffer MD  12/20/2023,9:09 AM

## 2024-01-04 ENCOUNTER — RA CDI HCC (OUTPATIENT)
Dept: OTHER | Facility: HOSPITAL | Age: 79
End: 2024-01-04

## 2024-01-04 DIAGNOSIS — Z95.0 PRESENCE OF PERMANENT CARDIAC PACEMAKER: ICD-10-CM

## 2024-01-04 RX ORDER — METOPROLOL SUCCINATE 25 MG/1
25 TABLET, EXTENDED RELEASE ORAL DAILY
Qty: 90 TABLET | Refills: 3 | Status: SHIPPED | OUTPATIENT
Start: 2024-01-04

## 2024-01-05 ENCOUNTER — IN-CLINIC DEVICE VISIT (OUTPATIENT)
Dept: CARDIOLOGY CLINIC | Facility: CLINIC | Age: 79
End: 2024-01-05
Payer: COMMERCIAL

## 2024-01-05 ENCOUNTER — HOSPITAL ENCOUNTER (OUTPATIENT)
Dept: NON INVASIVE DIAGNOSTICS | Facility: CLINIC | Age: 79
Discharge: HOME | End: 2024-01-05
Payer: COMMERCIAL

## 2024-01-05 VITALS
SYSTOLIC BLOOD PRESSURE: 160 MMHG | HEIGHT: 62 IN | BODY MASS INDEX: 22.82 KG/M2 | DIASTOLIC BLOOD PRESSURE: 92 MMHG | HEART RATE: 71 BPM | WEIGHT: 124 LBS

## 2024-01-05 DIAGNOSIS — I35.0 MODERATE AORTIC VALVE STENOSIS: ICD-10-CM

## 2024-01-05 DIAGNOSIS — Z95.0 PRESENCE OF PERMANENT CARDIAC PACEMAKER: Primary | ICD-10-CM

## 2024-01-05 LAB
AORTIC ROOT: 3 CM
AORTIC VALVE MEAN VELOCITY: 20 M/S
APICAL FOUR CHAMBER EJECTION FRACTION: 58 %
ASCENDING AORTA: 2.9 CM
AV AREA BY CONTINUOUS VTI: 0.7 CM2
AV AREA PEAK VELOCITY: 0.8 CM2
AV LVOT MEAN GRADIENT: 1 MMHG
AV LVOT PEAK GRADIENT: 3 MMHG
AV MEAN GRADIENT: 19 MMHG
AV PEAK GRADIENT: 33 MMHG
AV VALVE AREA: 0.74 CM2
AV VELOCITY RATIO: 0.3
DOP CALC AO PEAK VEL: 2.87 M/S
DOP CALC AO VTI: 67.62 CM
DOP CALC LVOT AREA: 2.54 CM2
DOP CALC LVOT CARDIAC INDEX: 2.62 L/MIN/M2
DOP CALC LVOT CARDIAC OUTPUT: 4.09 L/MIN
DOP CALC LVOT DIAMETER: 1.8 CM
DOP CALC LVOT PEAK VEL VTI: 19.71 CM
DOP CALC LVOT PEAK VEL: 0.85 M/S
DOP CALC LVOT STROKE INDEX: 32.7 ML/M2
DOP CALC LVOT STROKE VOLUME: 50.13
E WAVE DECELERATION TIME: 295 MS
E/A RATIO: 0.86
FRACTIONAL SHORTENING: 26 (ref 28–44)
INTERVENTRICULAR SEPTUM IN DIASTOLE (PARASTERNAL SHORT AXIS VIEW): 1.3 CM
INTERVENTRICULAR SEPTUM: 1.3 CM (ref 0.6–1.1)
LAAS-AP2: 18.8 CM2
LAAS-AP4: 17.3 CM2
LEFT ATRIUM SIZE: 3.2 CM
LEFT ATRIUM VOLUME (MOD BIPLANE): 53 ML
LEFT ATRIUM VOLUME INDEX (MOD BIPLANE): 34 ML/M2
LEFT INTERNAL DIMENSION IN SYSTOLE: 2.6 CM (ref 2.1–4)
LEFT VENTRICULAR INTERNAL DIMENSION IN DIASTOLE: 3.5 CM (ref 3.5–6)
LEFT VENTRICULAR POSTERIOR WALL IN END DIASTOLE: 1.2 CM
LEFT VENTRICULAR STROKE VOLUME: 27 ML
LVSV (TEICH): 27 ML
MV E'TISSUE VEL-SEP: 5 CM/S
MV PEAK A VEL: 1.25 M/S
MV PEAK E VEL: 107 CM/S
MV STENOSIS PRESSURE HALF TIME: 85 MS
MV VALVE AREA P 1/2 METHOD: 2.59
RIGHT ATRIUM AREA SYSTOLE A4C: 10 CM2
RIGHT VENTRICLE ID DIMENSION: 3.1 CM
SL CV LEFT ATRIUM LENGTH A2C: 4.8 CM
SL CV LV EF: 52
SL CV PED ECHO LEFT VENTRICLE DIASTOLIC VOLUME (MOD BIPLANE) 2D: 52 ML
SL CV PED ECHO LEFT VENTRICLE SYSTOLIC VOLUME (MOD BIPLANE) 2D: 25 ML
TR MAX PG: 33 MMHG
TR PEAK VELOCITY: 2.9 M/S
TRICUSPID ANNULAR PLANE SYSTOLIC EXCURSION: 1.5 CM
TRICUSPID VALVE PEAK REGURGITATION VELOCITY: 2.85 M/S

## 2024-01-05 PROCEDURE — 93306 TTE W/DOPPLER COMPLETE: CPT

## 2024-01-05 PROCEDURE — 93280 PM DEVICE PROGR EVAL DUAL: CPT | Performed by: INTERNAL MEDICINE

## 2024-01-05 PROCEDURE — 93306 TTE W/DOPPLER COMPLETE: CPT | Performed by: INTERNAL MEDICINE

## 2024-01-05 NOTE — PROGRESS NOTES
[employer] DC PM / ACTIVE SYSTEM IS MRI CONDITIONAL   DEVICE INTERROGATED IN THE [facility] OFFICE:  NP TO DEVICE CLINIC.  BATTERY VOLTAGE ADEQUATE (6.4-8.9 YR.).  AP 14%  <1%.  ALL LEAD PARAMETERS WITHIN NORMAL LIMITS.  10 HVR EPISODES WITH 1 EGM SHOWING NSVT (8 @ 182 BPM), AND REMAINING HVR EGMS SHOWING AF WITH RVR UP  BPM.  2,977 AMS EPISODES WITH ALL EGMS SHOWING AF WITH LONGEST EPISODE 1 D 38 M., AND AT/AF BURDEN <1% SINCE 11/18/2022.  PATIENT TAKES ELIQUIS AND METOPROLOL.  EF 58% (ECHO 01/2024).  NO PROGRAMMING CHANGES MADE TO DEVICE PARAMETERS.  NORMAL DEVICE FUNCTION.  RG

## 2024-01-12 ENCOUNTER — TELEPHONE (OUTPATIENT)
Dept: CARDIOLOGY CLINIC | Facility: CLINIC | Age: 79
End: 2024-01-12

## 2024-01-12 NOTE — TELEPHONE ENCOUNTER
----- Message from FLORECNIO Machado sent at 1/11/2024  4:20 PM EST -----  Please let patient known that there were some changes on her recent echo. Her EF (heart pump function) is in the low normal range. Her aortic valve is also showing moderate to severe stenosis (stiffening). It was previously moderate.    Please check to see if patient is experiencing any dizziness/ lightheadedness, chest pain or shortness of breath. She should notify the office if she develops any symptoms or go to the ED.     Please schedule follow-up with Dr. Schaffer to further discuss changes and options moving forward.

## 2024-01-14 ENCOUNTER — HOSPITAL ENCOUNTER (EMERGENCY)
Facility: HOSPITAL | Age: 79
Discharge: HOME | End: 2024-01-14
Attending: EMERGENCY MEDICINE
Payer: COMMERCIAL

## 2024-01-14 VITALS
HEART RATE: 61 BPM | RESPIRATION RATE: 18 BRPM | TEMPERATURE: 98.1 F | OXYGEN SATURATION: 97 % | DIASTOLIC BLOOD PRESSURE: 77 MMHG | SYSTOLIC BLOOD PRESSURE: 184 MMHG

## 2024-01-14 DIAGNOSIS — R11.0 NAUSEA: ICD-10-CM

## 2024-01-14 DIAGNOSIS — I35.0 AORTIC STENOSIS: Primary | ICD-10-CM

## 2024-01-14 LAB
2HR DELTA HS TROPONIN: -2 NG/L
ALBUMIN SERPL BCP-MCNC: 4.3 G/DL (ref 3.5–5)
ALP SERPL-CCNC: 58 U/L (ref 34–104)
ALT SERPL W P-5'-P-CCNC: 12 U/L (ref 7–52)
ANION GAP SERPL CALCULATED.3IONS-SCNC: 8 MMOL/L
AST SERPL W P-5'-P-CCNC: 21 U/L (ref 13–39)
BASOPHILS # BLD AUTO: 0.02 THOUSANDS/ÂΜL (ref 0–0.1)
BASOPHILS NFR BLD AUTO: 0 % (ref 0–1)
BILIRUB SERPL-MCNC: 0.55 MG/DL (ref 0.2–1)
BUN SERPL-MCNC: 16 MG/DL (ref 5–25)
CALCIUM SERPL-MCNC: 9.7 MG/DL (ref 8.4–10.2)
CARDIAC TROPONIN I PNL SERPL HS: 11 NG/L
CARDIAC TROPONIN I PNL SERPL HS: 9 NG/L
CHLORIDE SERPL-SCNC: 105 MMOL/L (ref 96–108)
CO2 SERPL-SCNC: 26 MMOL/L (ref 21–32)
CREAT SERPL-MCNC: 0.96 MG/DL (ref 0.6–1.3)
EOSINOPHIL # BLD AUTO: 0.11 THOUSAND/ÂΜL (ref 0–0.61)
EOSINOPHIL NFR BLD AUTO: 2 % (ref 0–6)
ERYTHROCYTE [DISTWIDTH] IN BLOOD BY AUTOMATED COUNT: 12.9 % (ref 11.6–15.1)
GFR SERPL CREATININE-BSD FRML MDRD: 56 ML/MIN/1.73SQ M
GLUCOSE SERPL-MCNC: 78 MG/DL (ref 65–140)
HCT VFR BLD AUTO: 40.9 % (ref 34.8–46.1)
HGB BLD-MCNC: 13.5 G/DL (ref 11.5–15.4)
IMM GRANULOCYTES # BLD AUTO: 0.02 THOUSAND/UL (ref 0–0.2)
IMM GRANULOCYTES NFR BLD AUTO: 0 % (ref 0–2)
LYMPHOCYTES # BLD AUTO: 1.56 THOUSANDS/ÂΜL (ref 0.6–4.47)
LYMPHOCYTES NFR BLD AUTO: 23 % (ref 14–44)
MCH RBC QN AUTO: 29.2 PG (ref 26.8–34.3)
MCHC RBC AUTO-ENTMCNC: 33 G/DL (ref 31.4–37.4)
MCV RBC AUTO: 88 FL (ref 82–98)
MONOCYTES # BLD AUTO: 0.75 THOUSAND/ÂΜL (ref 0.17–1.22)
MONOCYTES NFR BLD AUTO: 11 % (ref 4–12)
NEUTROPHILS # BLD AUTO: 4.22 THOUSANDS/ÂΜL (ref 1.85–7.62)
NEUTS SEG NFR BLD AUTO: 64 % (ref 43–75)
NRBC BLD AUTO-RTO: 0 /100 WBCS
PLATELET # BLD AUTO: 217 THOUSANDS/UL (ref 149–390)
PMV BLD AUTO: 8.9 FL (ref 8.9–12.7)
POTASSIUM SERPL-SCNC: 3.5 MMOL/L (ref 3.5–5.3)
PROT SERPL-MCNC: 7.1 G/DL (ref 6.4–8.4)
RBC # BLD AUTO: 4.63 MILLION/UL (ref 3.81–5.12)
SODIUM SERPL-SCNC: 139 MMOL/L (ref 135–147)
WBC # BLD AUTO: 6.68 THOUSAND/UL (ref 4.31–10.16)

## 2024-01-14 PROCEDURE — 93005 ELECTROCARDIOGRAM TRACING: CPT

## 2024-01-14 PROCEDURE — 99285 EMERGENCY DEPT VISIT HI MDM: CPT | Performed by: PHYSICIAN ASSISTANT

## 2024-01-14 PROCEDURE — 85025 COMPLETE CBC W/AUTO DIFF WBC: CPT | Performed by: PHYSICIAN ASSISTANT

## 2024-01-14 PROCEDURE — 80053 COMPREHEN METABOLIC PANEL: CPT | Performed by: PHYSICIAN ASSISTANT

## 2024-01-14 PROCEDURE — 99284 EMERGENCY DEPT VISIT MOD MDM: CPT

## 2024-01-14 PROCEDURE — 84484 ASSAY OF TROPONIN QUANT: CPT | Performed by: PHYSICIAN ASSISTANT

## 2024-01-14 PROCEDURE — 36415 COLL VENOUS BLD VENIPUNCTURE: CPT | Performed by: PHYSICIAN ASSISTANT

## 2024-01-14 RX ORDER — ONDANSETRON 4 MG/1
4 TABLET, ORALLY DISINTEGRATING ORAL EVERY 6 HOURS PRN
Qty: 20 TABLET | Refills: 0 | Status: SHIPPED | OUTPATIENT
Start: 2024-01-14

## 2024-01-14 NOTE — ED PROVIDER NOTES
"History  Chief Complaint   Patient presents with    Nausea     C/o nausea, no appetite, daughter states \"she had an EKG on Friday and was told to come here\" .      Patient is a 78-year-old female with a past medical history significant for severe aortic stenosis, hypertension presenting to the emergency department for evaluation of nausea, decreased appetite, fatigue, generalized weakness, lightheadedness.  Symptoms been ongoing for the last 3 to 4 days.  Patient had a recent echocardiogram which showed moderate to severe aortic stenosis and was instructed to report to the emergency department with any symptoms.  Patient is denying chest pain, shortness of breath, fevers, chills, abdominal pain.  No other complaints at this time.        Prior to Admission Medications   Prescriptions Last Dose Informant Patient Reported? Taking?   Fluticasone Furoate-Vilanterol 100-25 mcg/actuation inhaler  Self Yes No   Sig: Inhale 1 puff daily Rinse mouth after use.   albuterol (PROVENTIL HFA,VENTOLIN HFA) 90 mcg/act inhaler  Self Yes No   Sig: Inhale   apixaban (ELIQUIS) 5 mg  Self Yes No   Sig: Take 5 mg by mouth 2 (two) times a day   losartan (COZAAR) 50 mg tablet  Self Yes No   Sig: Take 50 mg by mouth daily   metoprolol succinate (TOPROL-XL) 25 mg 24 hr tablet   No No   Sig: Take 1 tablet (25 mg total) by mouth daily   montelukast (SINGULAIR) 10 mg tablet  Self Yes No   Sig: Take 10 mg by mouth   temazepam (RESTORIL) 15 mg capsule  Self Yes No   Sig: Take 15 mg by mouth daily at bedtime as needed for sleep   thyroid (ARMOUR) 15 MG tablet  Self Yes No   Sig: Take by mouth      Facility-Administered Medications Last Administration Doses Remaining   lidocaine (XYLOCAINE) 4 % topical solution 5 mL None recorded 1          Past Medical History:   Diagnosis Date    Asthma     Colon cancer (HCC)        Past Surgical History:   Procedure Laterality Date    CARDIAC PACEMAKER PLACEMENT  2020    COLON SURGERY      CRANIOTOMY Right 1989 "    R bleed    SKIN CANCER EXCISION Right     Melanoma R arm       Family History   Problem Relation Age of Onset    Heart disease Mother     COPD Sister     Emphysema Sister     Colon cancer Brother     Liver cancer Paternal Grandfather      I have reviewed and agree with the history as documented.    E-Cigarette/Vaping    E-Cigarette Use Never User      E-Cigarette/Vaping Substances    Nicotine No     THC No     CBD No     Flavoring No     Other No     Unknown No      Social History     Tobacco Use    Smoking status: Never    Smokeless tobacco: Never   Vaping Use    Vaping status: Never Used   Substance Use Topics    Alcohol use: Not Currently    Drug use: Never       Review of Systems   Constitutional:  Positive for activity change, appetite change and fatigue. Negative for chills and fever.   HENT:  Negative for congestion and sore throat.    Respiratory:  Negative for cough, chest tightness and shortness of breath.    Cardiovascular:  Negative for chest pain.   Gastrointestinal:  Positive for diarrhea and nausea. Negative for abdominal pain and vomiting.   Neurological:  Positive for weakness and light-headedness. Negative for dizziness, syncope and headaches.   All other systems reviewed and are negative.      Physical Exam  Physical Exam  Vitals reviewed.   Constitutional:       General: She is not in acute distress.     Appearance: Normal appearance. She is normal weight. She is not ill-appearing, toxic-appearing or diaphoretic.   HENT:      Head: Normocephalic and atraumatic.      Right Ear: External ear normal.      Left Ear: External ear normal.   Eyes:      General: No scleral icterus.        Right eye: No discharge.         Left eye: No discharge.      Extraocular Movements: Extraocular movements intact.      Conjunctiva/sclera: Conjunctivae normal.   Cardiovascular:      Rate and Rhythm: Normal rate and regular rhythm.      Heart sounds: Murmur (holosystolic) heard.      No friction rub. No gallop.    Pulmonary:      Effort: Pulmonary effort is normal. No respiratory distress.      Breath sounds: Normal breath sounds. No stridor. No wheezing, rhonchi or rales.   Abdominal:      General: Abdomen is flat.      Palpations: Abdomen is soft.      Tenderness: There is no abdominal tenderness. There is no guarding or rebound.   Musculoskeletal:      Cervical back: Normal range of motion and neck supple.   Skin:     General: Skin is warm and dry.   Neurological:      Mental Status: She is alert and oriented to person, place, and time.   Psychiatric:         Mood and Affect: Mood normal.         Behavior: Behavior normal.         Vital Signs  ED Triage Vitals [01/14/24 1406]   Temperature Pulse Respirations Blood Pressure SpO2   98.1 °F (36.7 °C) 84 18 (!) 218/93 98 %      Temp Source Heart Rate Source Patient Position - Orthostatic VS BP Location FiO2 (%)   Oral Monitor Sitting Left arm --      Pain Score       --           Vitals:    01/14/24 1406 01/14/24 1447 01/14/24 1615 01/14/24 1736   BP: (!) 218/93 (!) 177/79 138/94 (!) 184/77   Pulse: 84 60 60 61   Patient Position - Orthostatic VS: Sitting Sitting           Visual Acuity      ED Medications  Medications - No data to display    Diagnostic Studies  Results Reviewed       Procedure Component Value Units Date/Time    HS Troponin I 2hr [[id number]]  (Normal) Collected: 01/14/24 1636    Lab Status: Final result Specimen: Blood from Arm, Right Updated: 01/14/24 1723     hs TnI 2hr 9 ng/L      Delta 2hr hsTnI -2 ng/L     HS Troponin I 4hr [[id number]]     Lab Status: No result Specimen: Blood     HS Troponin 0hr (reflex protocol) [[id number]]  (Normal) Collected: 01/14/24 1446    Lab Status: Final result Specimen: Blood from Arm, Right Updated: 01/14/24 1524     hs TnI 0hr 11 ng/L     Comprehensive metabolic panel [[id number]] Collected: 01/14/24 1446    Lab Status: Final result Specimen: Blood from Arm, Right Updated: 01/14/24 1517     Sodium 139 mmol/L      Potassium  3.5 mmol/L      Chloride 105 mmol/L      CO2 26 mmol/L      ANION GAP 8 mmol/L      BUN 16 mg/dL      Creatinine 0.96 mg/dL      Glucose 78 mg/dL      Calcium 9.7 mg/dL      AST 21 U/L      ALT 12 U/L      Alkaline Phosphatase 58 U/L      Total Protein 7.1 g/dL      Albumin 4.3 g/dL      Total Bilirubin 0.55 mg/dL      eGFR 56 ml/min/1.73sq m     Narrative:      National Kidney Disease Foundation guidelines for Chronic Kidney Disease (CKD):     Stage 1 with normal or high GFR (GFR > 90 mL/min/1.73 square meters)    Stage 2 Mild CKD (GFR = 60-89 mL/min/1.73 square meters)    Stage 3A Moderate CKD (GFR = 45-59 mL/min/1.73 square meters)    Stage 3B Moderate CKD (GFR = 30-44 mL/min/1.73 square meters)    Stage 4 Severe CKD (GFR = 15-29 mL/min/1.73 square meters)    Stage 5 End Stage CKD (GFR <15 mL/min/1.73 square meters)  Note: GFR calculation is accurate only with a steady state creatinine    CBC and differential [[id number]] Collected: 01/14/24 1446    Lab Status: Final result Specimen: Blood from Arm, Right Updated: 01/14/24 1455     WBC 6.68 Thousand/uL      RBC 4.63 Million/uL      Hemoglobin 13.5 g/dL      Hematocrit 40.9 %      MCV 88 fL      MCH 29.2 pg      MCHC 33.0 g/dL      RDW 12.9 %      MPV 8.9 fL      Platelets 217 Thousands/uL      nRBC 0 /100 WBCs      Neutrophils Relative 64 %      Immat GRANS % 0 %      Lymphocytes Relative 23 %      Monocytes Relative 11 %      Eosinophils Relative 2 %      Basophils Relative 0 %      Neutrophils Absolute 4.22 Thousands/µL      Immature Grans Absolute 0.02 Thousand/uL      Lymphocytes Absolute 1.56 Thousands/µL      Monocytes Absolute 0.75 Thousand/µL      Eosinophils Absolute 0.11 Thousand/µL      Basophils Absolute 0.02 Thousands/µL                    No orders to display              Procedures  Procedures         ED Course  ED Course as of 01/14/24 1751   Sun Jan 14, 2024   1500 Discussed patient's case with cardiology on-call.  If patient remains  hemodynamically stable and workup is normal, patient can go home with outpatient follow-up.               Identification of Seniors at Risk      Flowsheet Row Most Recent Value   (ISAR) Identification of Seniors at Risk    Before the illness or injury that brought you to the Emergency, did you need someone to help you on a regular basis? 0 Filed at: 01/14/2024 1410   In the last 24 hours, have you needed more help than usual? 0 Filed at: 01/14/2024 1410   Have you been hospitalized for one or more nights during the past 6 months? 0 Filed at: 01/14/2024 1410   In general, do you see well? 0 Filed at: 01/14/2024 1410   In general, do you have serious problems with your memory? 0 Filed at: 01/14/2024 1410   Do you take more than three different medications every day? 0 Filed at: 01/14/2024 1410   ISAR Score 0 Filed at: 01/14/2024 1410                        SBIRT 22yo+      Flowsheet Row Most Recent Value   Initial Alcohol Screen: US AUDIT-C     1. How often do you have a drink containing alcohol? 0 Filed at: 01/14/2024 1410   2. How many drinks containing alcohol do you have on a typical day you are drinking?  0 Filed at: 01/14/2024 1410   3a. Male UNDER 65: How often do you have five or more drinks on one occasion? 0 Filed at: 01/14/2024 1410   3b. FEMALE Any Age, or MALE 65+: How often do you have 4 or more drinks on one occassion? 0 Filed at: 01/14/2024 1410   Audit-C Score 0 Filed at: 01/14/2024 1410   STARR: How many times in the past year have you...    Used an illegal drug or used a prescription medication for non-medical reasons? Never Filed at: 01/14/2024 1410                      Medical Decision Making  Patient presenting to the emergency department for evaluation of lightheadedness, generalized weakness, fatigue, decreased appetite in the setting of recently performed abnormal echocardiogram.  Patient has moderate to severe aortic stenosis and was told to report to the emergency department with development  of lightheadedness.  Upon arrival patient appears comfortable.  She does not appear to be in any acute distress.  She is hypertensive.  Remainder vital signs are unremarkable.  She is well-appearing on examination.  She does have a holosystolic murmur consistent with known history of aortic stenosis.  Her laboratory evaluation is reassuring.  EKG without evidence of acute ischemia.  I discussed patient case with cardiology who recommends outpatient follow-up for potential intervention.  Strict return precautions were discussed.  Patient is in stable condition at time of discharge.    Amount and/or Complexity of Data Reviewed  Labs: ordered.    Risk  Prescription drug management.             Disposition  Final diagnoses:   Aortic stenosis   Nausea     Time reflects when diagnosis was documented in both MDM as applicable and the Disposition within this note       Time User Action Codes Description Comment    1/14/2024  5:34 PM Luis Carter Add [I35.0] Aortic stenosis     1/14/2024  5:34 PM Luis Carter Add [R11.0] Nausea           ED Disposition       ED Disposition   Discharge    Condition   Stable    Date/Time   Sun Jan 14, 2024 8605    Comment   Glendy Pete discharge to home/self care.                   Follow-up Information       Follow up With Specialties Details Why Contact Info Additional Information    [facility] Emergency Medicine Go to  If symptoms worsen [address] [facility] [id number] [phone] [facility], [address] [place], [place], [address]    Kamar Schaffer MD Cardiology Schedule an appointment as soon as possible for a visit  for follow up [address] [place] [id number] [phone]               Patient's Medications   Discharge Prescriptions    ONDANSETRON (ZOFRAN ODT) 4 MG DISINTEGRATING TABLET    Take 1 tablet (4 mg total) by mouth every 6 (six) hours as  needed for nausea or vomiting       Start Date: 1/14/2024 End Date: --       Order Dose: 4 mg       Quantity: 20 tablet    Refills: 0       No discharge procedures on file.    PDMP Review       None            ED Provider  Electronically Signed by             Luis Carter PA-C  01/14/24 2654

## 2024-01-15 LAB
ATRIAL RATE: 60 BPM
P AXIS: 73 DEGREES
PR INTERVAL: 166 MS
QRS AXIS: 56 DEGREES
QRSD INTERVAL: 80 MS
QT INTERVAL: 410 MS
QTC INTERVAL: 410 MS
T WAVE AXIS: 50 DEGREES
VENTRICULAR RATE: 60 BPM

## 2024-01-16 ENCOUNTER — HOSPITAL ENCOUNTER (OUTPATIENT)
Dept: RADIOLOGY | Facility: HOSPITAL | Age: 79
Discharge: HOME | End: 2024-01-16

## 2024-01-22 ENCOUNTER — TELEPHONE (OUTPATIENT)
Dept: CARDIOLOGY CLINIC | Facility: CLINIC | Age: 79
End: 2024-01-22

## 2024-01-23 ENCOUNTER — OFFICE VISIT (OUTPATIENT)
Dept: CARDIOLOGY CLINIC | Facility: CLINIC | Age: 79
End: 2024-01-23
Payer: COMMERCIAL

## 2024-01-23 VITALS
HEART RATE: 70 BPM | DIASTOLIC BLOOD PRESSURE: 88 MMHG | SYSTOLIC BLOOD PRESSURE: 160 MMHG | BODY MASS INDEX: 22.82 KG/M2 | RESPIRATION RATE: 21 BRPM | WEIGHT: 124 LBS | OXYGEN SATURATION: 98 % | HEIGHT: 62 IN

## 2024-01-23 DIAGNOSIS — I35.0 AORTIC VALVE STENOSIS, ETIOLOGY OF CARDIAC VALVE DISEASE UNSPECIFIED: Primary | ICD-10-CM

## 2024-01-23 PROCEDURE — 99214 OFFICE O/P EST MOD 30 MIN: CPT | Performed by: INTERNAL MEDICINE

## 2024-01-23 NOTE — PROGRESS NOTES
Cardiology Follow Up    Glendy Pete  1945  [id number]  [facility] [place]  [address] [address] [phone]    Discussion/Summary:  Syncope s/p PPM  PAF  Moderate AS - Per echo (1/5/24) - Mean gradient of 19 mmHg,   Hypertension    Discussed echo findings with patient  Echo personally reviewed    Her AS appears to have progressed but still predominantly in moderate range. No symptoms.  Will repeat echo in 6 months for re-evaluation/surveillance  Discussed findings of AS and potential AVR/TAVR options with patient.     Recent device reports reviewed.     Otherwise, remains euvolemic.   Cont with current medications    She will inform us with onset of cardiac symptoms    Previous studies were reviewed.    Safety measures were reviewed.  Questions were entertained and answered.  Patient was advised to report any problems requiring medical attention.    Follow-up with PCP and appropriate specialist and lab work as discussed.    Return for follow up visit as scheduled or earlier, if needed.    Thank you for allowing me to participate in the care and evaluation of your patient.  Should you have any questions, please feel free to contact me.      History of Present Illness:     Glendy Pete is a 78 y.o. female who presents for follow up for cardiovascular care.    She denies all complaints  Denies chest pain, chest pressure, shortness of breath, dizziness, lightheadedness, presyncope or syncope.  Denies orthopnea, PND or lower limb edema.    No change in exercise tolerance      Patient moved from [place]. She has a history of syncope sp pacemaker placement, hypertension, Colon caner in 1990s and hypothyroidism.   She was on apixaban and flecainide. Also on losartan. Based on scanned echo report(dated 1/2022) she has preserved LV function, G1DD, and moderate AS. She has a [employer] pacemaker.  Flecainide stopped at OV 12/20/23. Started on low dose metoprolol.       Echo 1/5/24:    Left Ventricle: Left ventricular cavity size is normal. Wall thickness is mildly increased. There is mild concentric hypertrophy. The left ventricular ejection fraction is 52%. Systolic function is normal. Wall motion is normal. Diastolic function is moderately abnormal, consistent with grade II (pseudonormal) relaxation.  Left atrial filling pressure is elevated.    Aortic Valve: The aortic valve is trileaflet. The leaflets are moderately thickened. The leaflets are mildly calcified. The leaflets exhibit normal mobility. There is moderate to severe stenosis.    Tricuspid Valve: There is mild regurgitation.    Pulmonary Artery: The pulmonary artery systolic pressure is mildly increased.    Patient Active Problem List   Diagnosis    History of melanoma    Hypothyroid    Primary hypertension    Mild intermittent asthma without complication    Presence of permanent cardiac pacemaker    Hearing impairment    History of colon cancer     Past Medical History:   Diagnosis Date    Asthma     Colon cancer (HCC)      Social History     Socioeconomic History    Marital status: Single     Spouse name: Not on file    Number of children: Not on file    Years of education: Not on file    Highest education level: Not on file   Occupational History    Not on file   Tobacco Use    Smoking status: Never    Smokeless tobacco: Never   Vaping Use    Vaping status: Never Used   Substance and Sexual Activity    Alcohol use: Not Currently    Drug use: Never    Sexual activity: Not Currently   Other Topics Concern    Not on file   Social History Narrative    Divorsed        1 child    Hobbies-art    Reg dental care, brushes twice daily     Social Determinants of Health     Financial Resource Strain: Low Risk  (5/26/2023)    Overall Financial Resource Strain (CARDIA)     Difficulty of Paying Living Expenses: Not hard at all   Food Insecurity:  Not on file   Transportation Needs: No Transportation Needs (5/26/2023)    PRAPARE - Transportation     Lack of Transportation (Medical): No     Lack of Transportation (Non-Medical): No   Physical Activity: Not on file   Stress: Not on file   Social Connections: Not on file   Intimate Partner Violence: Not on file   Housing Stability: Not on file      Family History   Problem Relation Age of Onset    Heart disease Mother     COPD Sister     Emphysema Sister     Colon cancer Brother     Liver cancer Paternal Grandfather      Past Surgical History:   Procedure Laterality Date    CARDIAC PACEMAKER PLACEMENT  2020    COLON SURGERY      CRANIOTOMY Right 1989    R bleed    SKIN CANCER EXCISION Right     Melanoma R arm       Current Outpatient Medications:     albuterol (PROVENTIL HFA,VENTOLIN HFA) 90 mcg/act inhaler, Inhale, Disp: , Rfl:     apixaban (ELIQUIS) 5 mg, Take 5 mg by mouth 2 (two) times a day, Disp: , Rfl:     Fluticasone Furoate-Vilanterol 100-25 mcg/actuation inhaler, Inhale 1 puff daily Rinse mouth after use., Disp: , Rfl:     losartan (COZAAR) 50 mg tablet, Take 50 mg by mouth daily, Disp: , Rfl:     metoprolol succinate (TOPROL-XL) 25 mg 24 hr tablet, Take 1 tablet (25 mg total) by mouth daily, Disp: 90 tablet, Rfl: 3    montelukast (SINGULAIR) 10 mg tablet, Take 10 mg by mouth, Disp: , Rfl:     ondansetron (Zofran ODT) 4 mg disintegrating tablet, Take 1 tablet (4 mg total) by mouth every 6 (six) hours as needed for nausea or vomiting, Disp: 20 tablet, Rfl: 0    temazepam (RESTORIL) 15 mg capsule, Take 15 mg by mouth daily at bedtime as needed for sleep, Disp: , Rfl:     thyroid (ARMOUR) 15 MG tablet, Take by mouth, Disp: , Rfl:     Current Facility-Administered Medications:     lidocaine (XYLOCAINE) 4 % topical solution 5 mL, 5 mL, Topical, Once, Geno Baez PA-C  Allergies   Allergen Reactions    Codeine Nausea Only       Labs:  Lab Results   Component Value Date    WBC 6.68 01/14/2024    HGB 13.5  "01/14/2024    HCT 40.9 01/14/2024    MCV 88 01/14/2024     01/14/2024     Lab Results   Component Value Date    CALCIUM 9.7 01/14/2024    K 3.5 01/14/2024    CO2 26 01/14/2024     01/14/2024    BUN 16 01/14/2024    CREATININE 0.96 01/14/2024     No results found for: \"HGBA1C\"  No results found for: \"CHOL\"  Lab Results   Component Value Date    HDL 58 12/15/2023    HDL 61 05/25/2023     Lab Results   Component Value Date    LDLCALC 119 (H) 12/15/2023    LDLCALC 109 (H) 05/25/2023     Lab Results   Component Value Date    TRIG 77 12/15/2023    TRIG 148 05/25/2023     No results found for: \"CHOLHDL\"    Review of Systems:  Review of Systems   Constitutional: Negative.  Negative for activity change, appetite change and diaphoresis.   Respiratory:  Negative for chest tightness and shortness of breath.    Cardiovascular:  Negative for chest pain, palpitations and leg swelling.   Gastrointestinal:  Negative for nausea and vomiting.   Musculoskeletal:  Negative for arthralgias and back pain.   Skin:  Negative for color change and pallor.   Neurological:  Negative for dizziness, syncope, weakness and light-headedness.   Psychiatric/Behavioral:  Negative for agitation.    All other systems reviewed and are negative.      Physical Exam:  Physical Exam  Vitals and nursing note reviewed.   Constitutional:       General: She is not in acute distress.     Appearance: Normal appearance. She is not ill-appearing or diaphoretic.   HENT:      Head: Normocephalic and atraumatic.   Eyes:      Extraocular Movements: Extraocular movements intact.   Cardiovascular:      Rate and Rhythm: Normal rate and regular rhythm.      Heart sounds: Murmur heard.      No friction rub. No gallop.   Pulmonary:      Effort: Pulmonary effort is normal. No respiratory distress.      Breath sounds: Normal breath sounds.   Abdominal:      General: There is no distension.      Palpations: Abdomen is soft.   Musculoskeletal:         General: No " swelling. Normal range of motion.   Skin:     General: Skin is warm and dry.      Capillary Refill: Capillary refill takes less than 2 seconds.      Coloration: Skin is not pale.   Neurological:      General: No focal deficit present.      Mental Status: She is alert and oriented to person, place, and time.      Cranial Nerves: No cranial nerve deficit.   Psychiatric:         Mood and Affect: Mood normal.         Behavior: Behavior normal.

## 2024-01-29 ENCOUNTER — HOSPITAL ENCOUNTER (OUTPATIENT)
Dept: VASCULAR ULTRASOUND | Facility: HOSPITAL | Age: 79
Discharge: HOME | End: 2024-01-29
Payer: COMMERCIAL

## 2024-01-29 DIAGNOSIS — R09.89 BILATERAL CAROTID BRUITS: ICD-10-CM

## 2024-01-29 DIAGNOSIS — R41.3 MEMORY LOSS: ICD-10-CM

## 2024-01-29 PROCEDURE — 93880 EXTRACRANIAL BILAT STUDY: CPT

## 2024-01-29 PROCEDURE — 93880 EXTRACRANIAL BILAT STUDY: CPT | Performed by: SURGERY

## 2024-01-30 ENCOUNTER — TELEPHONE (OUTPATIENT)
Dept: INTERNAL MEDICINE CLINIC | Facility: CLINIC | Age: 79
End: 2024-01-30

## 2024-01-30 DIAGNOSIS — I65.22 ATHEROSCLEROSIS OF LEFT CAROTID ARTERY: Primary | ICD-10-CM

## 2024-01-30 NOTE — TELEPHONE ENCOUNTER
----- Message from Darrel Mathew PA-C sent at 1/30/2024  6:28 AM EST -----  Carotid ultrasound shows significant blockage in the left carotid.  I would like to refer her to vascular surgery for a consultation.  Make sure this is okay with her and we can put in the referral.

## 2024-01-30 NOTE — TELEPHONE ENCOUNTER
Patient's daughter was notified and provided with central scheduling phone number referral was placed.

## 2024-02-19 ENCOUNTER — CONSULT (OUTPATIENT)
Dept: VASCULAR SURGERY | Facility: CLINIC | Age: 79
End: 2024-02-19
Payer: COMMERCIAL

## 2024-02-19 VITALS
WEIGHT: 123 LBS | BODY MASS INDEX: 22.63 KG/M2 | OXYGEN SATURATION: 95 % | SYSTOLIC BLOOD PRESSURE: 132 MMHG | HEART RATE: 60 BPM | HEIGHT: 62 IN | DIASTOLIC BLOOD PRESSURE: 90 MMHG

## 2024-02-19 DIAGNOSIS — I65.22 CAROTID STENOSIS, ASYMPTOMATIC, LEFT: Primary | ICD-10-CM

## 2024-02-19 DIAGNOSIS — I65.22 ATHEROSCLEROSIS OF LEFT CAROTID ARTERY: ICD-10-CM

## 2024-02-19 PROCEDURE — 99205 OFFICE O/P NEW HI 60 MIN: CPT | Performed by: NURSE PRACTITIONER

## 2024-02-19 RX ORDER — ATORVASTATIN CALCIUM 20 MG/1
20 TABLET, FILM COATED ORAL DAILY
Qty: 90 TABLET | Refills: 0 | Status: SHIPPED | OUTPATIENT
Start: 2024-02-19

## 2024-02-19 NOTE — PROGRESS NOTES
Assessment/Plan:    Carotid stenosis, asymptomatic, left  79y/o f w/ pmhx hypothyroid, asthma, HTN, hearing impairment, cardiac pacemaker is a new consult to the office to establish care and review  carotid ultrasound from 1/29/2024 which demonstrates right ICA less than 50% stenosis with velocities 60/13 and a ratio of 0.90.  Left ICA 70% stenosis with velocities 242/38 and a ratio of 3.67    Denies symptoms of numbness/ tingling/ weakness on one side of the body, facial droop, slurred speech or blindness in one eye. We reviewed if signs and symptoms occur she should call 911 or go to the ED immediately.     -Reviewed most recent carotid ultrasound results in detail with pt and her family. Discussed pathophysiology of arterial disease and indication for vascular intervention. No vascular intervention planned at this time. Discussed that we will continue with medical management and non-invasive imaging at this time. Discussed importance of initiating statin therapy. RX provided today with instructions on use.     Plan  -Complete carotid ultrasound in 3 months and return to the office for review.  -On Eliquis per cardiology.   -Not currently on statin therapy, reviewed last liver panel, WNT, Atorvastatin RX sent to pharmacy of her choice.   -Call 911/ Go to the ER with any S/S of TIA/ CVA.  -Start exercise daily.    Primary hypertension  -BP well controlled  -continue current medical regimen  -management per PCP         Diagnoses and all orders for this visit:    Carotid stenosis, asymptomatic, left  -     VAS carotid complete study; Future  -     atorvastatin (LIPITOR) 20 mg tablet; Take 1 tablet (20 mg total) by mouth daily    Atherosclerosis of left carotid artery  -     Ambulatory Referral to Vascular Surgery  -     atorvastatin (LIPITOR) 20 mg tablet; Take 1 tablet (20 mg total) by mouth daily          Subjective:      Patient ID: Glendy Pete is a 78 y.o. female.    Patient presents for consult of carotid  "stenosis. She had a CV done 1/29/24. She denies TIA/CVA. Her daughter reports she has had increased confusion. She is taking Eliquis.      79y/o f w/ pmhx hypothyroid, asthma, HTN, hearing impairment, cardiac pacemaker is a new consult to the office to establish care and review  carotid ultrasound from 1/29/2024  Pt has recently re-located here from [place] and presents today accompanied with her daughter. Pt and family report increasing forgetfulness, report that she is on eliquis for her heart, 'because of the pacemaker'.   Denies symptoms of numbness/ tingling/ weakness on one side of the body, facial droop, slurred speech or blindness in one eye.   She is compliant eliquis daily.   Her daughter reports that her mother has a poor diet and does not exercise. We reviewed the importance of increased daily activity.         The following portions of the patient's history were reviewed and updated as appropriate: allergies, current medications, past family history, past medical history, past social history, past surgical history, and problem list.    Review of Systems   Constitutional: Negative.    HENT: Negative.          [place]   Eyes: Negative.  Negative for visual disturbance.   Respiratory: Negative.  Negative for shortness of breath.    Cardiovascular: Negative.  Negative for chest pain and leg swelling.   Gastrointestinal: Negative.    Endocrine: Negative.    Genitourinary: Negative.    Musculoskeletal: Negative.    Skin: Negative.    Allergic/Immunologic: Negative.    Neurological: Negative.  Negative for dizziness, facial asymmetry, speech difficulty, weakness, light-headedness and headaches.   Hematological: Negative.    Psychiatric/Behavioral: Negative.           Objective:      /90 (BP Location: Left arm, Patient Position: Sitting, Cuff Size: Standard)   Pulse 60   Ht 5' 2\" (1.575 m)   Wt 55.8 kg (123 lb)   SpO2 95%   BMI 22.50 kg/m²          Physical Exam  Vitals and nursing note reviewed. " "  Constitutional:       Appearance: Normal appearance. She is normal weight.   HENT:      Head: Normocephalic and atraumatic.   Neck:      Vascular: Carotid bruit: Loud cardiac murmur, unable to hear.   Cardiovascular:      Pulses:           Radial pulses are 2+ on the right side and 2+ on the left side.      Heart sounds: Murmur heard.   Pulmonary:      Effort: Pulmonary effort is normal. No respiratory distress.      Breath sounds: Normal breath sounds.   Musculoskeletal:      Cervical back: Normal range of motion. Rigidity present.      Right lower leg: No edema.      Left lower leg: No edema.   Skin:     General: Skin is warm and dry.      Capillary Refill: Capillary refill takes less than 2 seconds.   Neurological:      General: No focal deficit present.      Mental Status: She is alert and oriented to person, place, and time.      Sensory: No sensory deficit.   Psychiatric:         Mood and Affect: Mood normal.         Behavior: Behavior normal.         I have reviewed and made appropriate changes to the review of systems input by the medical assistant.    Vitals:    02/19/24 0958   BP: 132/90   BP Location: Left arm   Patient Position: Sitting   Cuff Size: Standard   Pulse: 60   SpO2: 95%   Weight: 55.8 kg (123 lb)   Height: 5' 2\" (1.575 m)       Patient Active Problem List   Diagnosis    History of melanoma    Hypothyroid    Primary hypertension    Mild intermittent asthma without complication    Presence of permanent cardiac pacemaker    Hearing impairment    History of colon cancer    Carotid stenosis, asymptomatic, left       Past Surgical History:   Procedure Laterality Date    CARDIAC PACEMAKER PLACEMENT  2020    COLON SURGERY      CRANIOTOMY Right 1989    R bleed    SKIN CANCER EXCISION Right     Melanoma R arm       Family History   Problem Relation Age of Onset    Heart disease Mother     COPD Sister     Emphysema Sister     Colon cancer Brother     Liver cancer Paternal Grandfather        Social " History     Socioeconomic History    Marital status: Single     Spouse name: Not on file    Number of children: Not on file    Years of education: Not on file    Highest education level: Not on file   Occupational History    Not on file   Tobacco Use    Smoking status: Never    Smokeless tobacco: Never   Vaping Use    Vaping status: Never Used   Substance and Sexual Activity    Alcohol use: Not Currently    Drug use: Never    Sexual activity: Not Currently   Other Topics Concern    Not on file   Social History Narrative    Divorsed        1 child    Hobbies-art    Reg dental care, brushes twice daily     Social Determinants of Health     Financial Resource Strain: Low Risk  (5/26/2023)    Overall Financial Resource Strain (CARDIA)     Difficulty of Paying Living Expenses: Not hard at all   Food Insecurity: Not on file   Transportation Needs: No Transportation Needs (5/26/2023)    PRAPARE - Transportation     Lack of Transportation (Medical): No     Lack of Transportation (Non-Medical): No   Physical Activity: Not on file   Stress: Not on file   Social Connections: Not on file   Intimate Partner Violence: Not on file   Housing Stability: Not on file       Allergies   Allergen Reactions    Codeine Nausea Only         Current Outpatient Medications:     albuterol (PROVENTIL HFA,VENTOLIN HFA) 90 mcg/act inhaler, Inhale, Disp: , Rfl:     apixaban (ELIQUIS) 5 mg, Take 5 mg by mouth 2 (two) times a day, Disp: , Rfl:     atorvastatin (LIPITOR) 20 mg tablet, Take 1 tablet (20 mg total) by mouth daily, Disp: 90 tablet, Rfl: 0    Fluticasone Furoate-Vilanterol 100-25 mcg/actuation inhaler, Inhale 1 puff daily Rinse mouth after use., Disp: , Rfl:     losartan (COZAAR) 50 mg tablet, Take 50 mg by mouth daily, Disp: , Rfl:     metoprolol succinate (TOPROL-XL) 25 mg 24 hr tablet, Take 1 tablet (25 mg total) by mouth daily, Disp: 90 tablet, Rfl: 3    montelukast (SINGULAIR) 10 mg tablet, Take 10 mg by mouth, Disp: ,  Rfl:     ondansetron (Zofran ODT) 4 mg disintegrating tablet, Take 1 tablet (4 mg total) by mouth every 6 (six) hours as needed for nausea or vomiting, Disp: 20 tablet, Rfl: 0    temazepam (RESTORIL) 15 mg capsule, Take 15 mg by mouth daily at bedtime as needed for sleep, Disp: , Rfl:     thyroid (ARMOUR) 15 MG tablet, Take by mouth, Disp: , Rfl:     Current Facility-Administered Medications:     lidocaine (XYLOCAINE) 4 % topical solution 5 mL, 5 mL, Topical, Once, Geno Baez PA-C  I have spent a total time of 30 minutes on 02/19/24 in caring for this patient including Diagnostic results, Risks and benefits of tx options, Instructions for management, Patient and family education, Importance of tx compliance, Documenting in the medical record, Reviewing / ordering tests, medicine, procedures  , and Obtaining or reviewing history  .

## 2024-02-19 NOTE — PATIENT INSTRUCTIONS
-Complete carotid ultrasound in 3 months and return to the office for review.    -Start taking lipitor or atorvastatin every day, as instructed on the label. Call the office with any questions or concerns.     -Go to the ED with any signs or symptoms of stroke such as numbness/ tingling on one side of your body, blindness in one eye, slurred speech, facial droop.    -Increase physical activity. Try to exercise 3 times a week for 30 min.     -Call the office with any incision site swelling, pain, discharge or fever.

## 2024-02-19 NOTE — ASSESSMENT & PLAN NOTE
-BP well controlled  -continue current medical regimen  -management per PCP    
79y/o f w/ pmhx hypothyroid, asthma, HTN, hearing impairment, cardiac pacemaker is a new consult to the office to establish care and review  carotid ultrasound from 1/29/2024 which demonstrates right ICA less than 50% stenosis with velocities 60/13 and a ratio of 0.90.  Left ICA 70% stenosis with velocities 242/38 and a ratio of 3.67    Denies symptoms of numbness/ tingling/ weakness on one side of the body, facial droop, slurred speech or blindness in one eye. We reviewed if signs and symptoms occur she should call 911 or go to the ED immediately.     -Reviewed most recent carotid ultrasound results in detail with pt and her family. Discussed pathophysiology of arterial disease and indication for vascular intervention. No vascular intervention planned at this time. Discussed that we will continue with medical management and non-invasive imaging at this time. Discussed importance of initiating statin therapy. RX provided today with instructions on use.     Plan  -Complete carotid ultrasound in 3 months and return to the office for review.  -On Eliquis per cardiology.   -Not currently on statin therapy, reviewed last liver panel, WNT, Atorvastatin RX sent to pharmacy of her choice.   -Call 911/ Go to the ER with any S/S of TIA/ CVA.  -Start exercise daily.  
operating room

## 2024-02-20 ENCOUNTER — HOSPITAL ENCOUNTER (OUTPATIENT)
Dept: RADIOLOGY | Facility: HOSPITAL | Age: 79
Discharge: HOME | End: 2024-02-20
Payer: COMMERCIAL

## 2024-02-20 DIAGNOSIS — R41.3 MEMORY LOSS: ICD-10-CM

## 2024-02-20 DIAGNOSIS — Z95.0 PACEMAKER: ICD-10-CM

## 2024-02-20 PROCEDURE — 70553 MRI BRAIN STEM W/O & W/DYE: CPT

## 2024-02-20 PROCEDURE — G1004 CDSM NDSC: HCPCS

## 2024-02-20 PROCEDURE — A9585 GADOBUTROL INJECTION: HCPCS | Performed by: PHYSICIAN ASSISTANT

## 2024-02-20 RX ORDER — GADOBUTROL 604.72 MG/ML
5 INJECTION INTRAVENOUS
Status: COMPLETED | OUTPATIENT
Start: 2024-02-20 | End: 2024-02-20

## 2024-02-20 RX ADMIN — GADOBUTROL 5 ML: 604.72 INJECTION INTRAVENOUS at 15:20

## 2024-02-20 NOTE — NURSING NOTE
Pt arrived for chest xray clearance. Radiologist cleared patient for MRI--leads intact. Pt removed hearing aid and bridge from mouth. Kept in locker.     [employer] device interrogation for MRI done by [employer] rep.  Device set to MRI safe mode @85 bpm    MRI brain NeuroQuant w/without contrast complete. Pt tolerated well.   VSS throughout scan. Pt alert and oriented on room air. No complaints or visible signs of distress.    Device reprogrammed to prior settings per Cardiology by [employer] rep.

## 2024-02-29 ENCOUNTER — OFFICE VISIT (OUTPATIENT)
Dept: INTERNAL MEDICINE CLINIC | Facility: CLINIC | Age: 79
End: 2024-02-29
Payer: COMMERCIAL

## 2024-02-29 VITALS
SYSTOLIC BLOOD PRESSURE: 148 MMHG | DIASTOLIC BLOOD PRESSURE: 76 MMHG | HEART RATE: 76 BPM | RESPIRATION RATE: 16 BRPM | HEIGHT: 62 IN | TEMPERATURE: 98 F | WEIGHT: 123 LBS | BODY MASS INDEX: 22.63 KG/M2 | OXYGEN SATURATION: 98 %

## 2024-02-29 DIAGNOSIS — I35.0 MODERATE AORTIC STENOSIS: ICD-10-CM

## 2024-02-29 DIAGNOSIS — R21 FACIAL RASH: ICD-10-CM

## 2024-02-29 DIAGNOSIS — E03.9 HYPOTHYROIDISM, UNSPECIFIED TYPE: ICD-10-CM

## 2024-02-29 DIAGNOSIS — J45.20 MILD INTERMITTENT ASTHMA WITHOUT COMPLICATION: ICD-10-CM

## 2024-02-29 DIAGNOSIS — N18.31 STAGE 3A CHRONIC KIDNEY DISEASE (HCC): ICD-10-CM

## 2024-02-29 DIAGNOSIS — H91.93 BILATERAL HEARING LOSS, UNSPECIFIED HEARING LOSS TYPE: ICD-10-CM

## 2024-02-29 DIAGNOSIS — I10 PRIMARY HYPERTENSION: Primary | ICD-10-CM

## 2024-02-29 DIAGNOSIS — I65.22 CAROTID STENOSIS, ASYMPTOMATIC, LEFT: ICD-10-CM

## 2024-02-29 PROBLEM — I48.0 PAROXYSMAL ATRIAL FIBRILLATION (HCC): Status: ACTIVE | Noted: 2024-02-29

## 2024-02-29 PROCEDURE — 99214 OFFICE O/P EST MOD 30 MIN: CPT | Performed by: PHYSICIAN ASSISTANT

## 2024-02-29 RX ORDER — LOSARTAN POTASSIUM 100 MG/1
100 TABLET ORAL DAILY
Qty: 90 TABLET | Refills: 1 | Status: SHIPPED | OUTPATIENT
Start: 2024-02-29

## 2024-02-29 RX ORDER — ALBUTEROL SULFATE 90 UG/1
2 AEROSOL, METERED RESPIRATORY (INHALATION) EVERY 6 HOURS PRN
Qty: 18 G | Refills: 5 | Status: SHIPPED | OUTPATIENT
Start: 2024-02-29

## 2024-02-29 RX ORDER — TRIAMCINOLONE ACETONIDE 1 MG/G
CREAM TOPICAL 2 TIMES DAILY
Qty: 30 G | Refills: 0 | Status: SHIPPED | OUTPATIENT
Start: 2024-02-29

## 2024-02-29 NOTE — PROGRESS NOTES
Assessment/Plan:   Patient Instructions   We will increase your losartan dose from 50 mg daily to 100 mg daily.  You currently have 50 mg tablets, you can finish them by taking 2 tablets daily until you run out and then you can start the new 100 mg tablet daily.  Continue other medications the same at this time.  Would like you back for a blood pressure check by the nursing staff in 1 month.  Schedule follow-up here with me in 4 months with repeat labs for that visit.  Can apply steroid cream to facial rash twice a day until gone.  Do not get near her eyes.     Quality Measures:   Depression Screening and Follow-up Plan: Patient was screened for depression during today's encounter. They screened negative with a PHQ-2 score of 0.         Return in about 14 weeks (around 6/6/2024) for Regular Visit + Medicare Annual Wellness, also bp check one month by MA.         Diagnoses and all orders for this visit:    Primary hypertension  -     Comprehensive metabolic panel; Future  -     losartan (COZAAR) 100 MG tablet; Take 1 tablet (100 mg total) by mouth daily    Hypothyroidism, unspecified type  -     T4, free; Future  -     TSH, 3rd generation; Future    Bilateral hearing loss, unspecified hearing loss type    Carotid stenosis, asymptomatic, left  -     Lipid panel; Future    Mild intermittent asthma without complication  -     albuterol (PROVENTIL HFA,VENTOLIN HFA) 90 mcg/act inhaler; Inhale 2 puffs every 6 (six) hours as needed for wheezing    Facial rash  -     triamcinolone (KENALOG) 0.1 % cream; Apply topically 2 (two) times a day    Stage 3a chronic kidney disease (HCC)    Moderate aortic stenosis          Subjective:      Patient ID: Glendy Pete is a 78 y.o. female.    Follow-up, labs and imaging studies reviewed with patient    Patient had over the summer moved to this area from [place] and was living with her brother.  At that time patient was under significant amount of stress.  She also had symptoms of  depression.  Patient also seemed very forgetful to her brother and her daughter.  MRI neuro quantitative was done, not indicating any neurodegenerative process.  Mainly showing evidence of previous trauma in microvascular disease most likely from chronic hypertension.  Patient's daughter was on the phone at the time to hear the report.  Both reports she is doing much better since she has settled in and is feeling better.      Hypertension: Not at goal.  Systolic blood pressure still elevated.  Will have patient continue her metoprolol however increase her losartan to 100 mg daily.  Geno cp, palp, sob, edema, HA, dizziness, diaphoresis, syncope, visual disturbance.    Aortic stenosis-moderate, paroxysmal atrial fibrillation, permanent pacemaker: Patient on Eliquis, and metoprolol.  Following with cardiology.    CKD 3A: GFR 56, BUN and creatinine acceptable.  Instructed to stay hydrated.  Avoid nephrotoxic substances.    Mild asthma: On montelukast.  Uses rescue albuterol as needed.  Denies shortness of breath or wheezing.    Hypothyroidism: On replacement.  Euthyroid by labs.  She will continue the [place] Thyroid.    Bilateral hearing loss: Now wearing hearing aids and finds them beneficial.    Asymptomatic carotid stenosis: Following with vascular.  Will be imaged periodically.    Facial rash: This has been present since she visited her daughter.  Raised slightly red and itchy.          ALLERGIES:  Allergies   Allergen Reactions   • Codeine Nausea Only       CURRENT MEDICATIONS:    Current Outpatient Medications:   •  albuterol (PROVENTIL HFA,VENTOLIN HFA) 90 mcg/act inhaler, Inhale 2 puffs every 6 (six) hours as needed for wheezing, Disp: 18 g, Rfl: 5  •  apixaban (ELIQUIS) 5 mg, Take 5 mg by mouth 2 (two) times a day, Disp: , Rfl:   •  atorvastatin (LIPITOR) 20 mg tablet, Take 1 tablet (20 mg total) by mouth daily, Disp: 90 tablet, Rfl: 0  •  Fluticasone Furoate-Vilanterol 100-25 mcg/actuation inhaler, Inhale 1  puff daily Rinse mouth after use., Disp: , Rfl:   •  losartan (COZAAR) 100 MG tablet, Take 1 tablet (100 mg total) by mouth daily, Disp: 90 tablet, Rfl: 1  •  metoprolol succinate (TOPROL-XL) 25 mg 24 hr tablet, Take 1 tablet (25 mg total) by mouth daily, Disp: 90 tablet, Rfl: 3  •  montelukast (SINGULAIR) 10 mg tablet, Take 10 mg by mouth, Disp: , Rfl:   •  ondansetron (Zofran ODT) 4 mg disintegrating tablet, Take 1 tablet (4 mg total) by mouth every 6 (six) hours as needed for nausea or vomiting, Disp: 20 tablet, Rfl: 0  •  temazepam (RESTORIL) 15 mg capsule, Take 15 mg by mouth daily at bedtime as needed for sleep, Disp: , Rfl:   •  thyroid (ARMOUR) 15 MG tablet, Take by mouth, Disp: , Rfl:   •  triamcinolone (KENALOG) 0.1 % cream, Apply topically 2 (two) times a day, Disp: 30 g, Rfl: 0    Current Facility-Administered Medications:   •  lidocaine (XYLOCAINE) 4 % topical solution 5 mL, 5 mL, Topical, Once, Geno Baez PA-C    ACTIVE PROBLEM LIST:  Patient Active Problem List   Diagnosis   • History of melanoma   • Hypothyroid   • Primary hypertension   • Mild intermittent asthma without complication   • Presence of permanent cardiac pacemaker   • Hearing impairment   • History of colon cancer   • Carotid stenosis, asymptomatic, left   • Stage 3a chronic kidney disease (HCC)   • Moderate aortic stenosis   • Paroxysmal atrial fibrillation (HCC)       PAST MEDICAL HISTORY:  Past Medical History:   Diagnosis Date   • Asthma    • Colon cancer (HCC)        PAST SURGICAL HISTORY:  Past Surgical History:   Procedure Laterality Date   • CARDIAC PACEMAKER PLACEMENT  2020   • COLON SURGERY     • CRANIOTOMY Right 1989    R bleed   • SKIN CANCER EXCISION Right     Melanoma R arm       FAMILY HISTORY:  Family History   Problem Relation Age of Onset   • Heart disease Mother    • COPD Sister    • Emphysema Sister    • Colon cancer Brother    • Liver cancer Paternal Grandfather        SOCIAL HISTORY:  Social History      Socioeconomic History   • Marital status: Single     Spouse name: Not on file   • Number of children: Not on file   • Years of education: Not on file   • Highest education level: Not on file   Occupational History   • Not on file   Tobacco Use   • Smoking status: Never   • Smokeless tobacco: Never   Vaping Use   • Vaping status: Never Used   Substance and Sexual Activity   • Alcohol use: Not Currently   • Drug use: Never   • Sexual activity: Not Currently   Other Topics Concern   • Not on file   Social History Narrative    Divorsed        1 child    Hobbies-art    Reg dental care, brushes twice daily     Social Determinants of Health     Financial Resource Strain: Low Risk  (5/26/2023)    Overall Financial Resource Strain (CARDIA)    • Difficulty of Paying Living Expenses: Not hard at all   Food Insecurity: Not on file   Transportation Needs: No Transportation Needs (5/26/2023)    PRAPARE - Transportation    • Lack of Transportation (Medical): No    • Lack of Transportation (Non-Medical): No   Physical Activity: Not on file   Stress: Not on file   Social Connections: Not on file   Intimate Partner Violence: Not on file   Housing Stability: Not on file       Review of Systems   Constitutional:  Negative for activity change, chills, fatigue and fever.   HENT:  Negative for congestion.    Eyes:  Negative for discharge.   Respiratory:  Negative for cough, chest tightness and shortness of breath.    Cardiovascular:  Negative for chest pain, palpitations and leg swelling.   Gastrointestinal:  Negative for abdominal pain, blood in stool, constipation, diarrhea, nausea and vomiting.   Endocrine: Negative for polydipsia, polyphagia and polyuria.   Genitourinary:  Negative for difficulty urinating.   Musculoskeletal:  Negative for arthralgias and myalgias.   Skin:  Negative for rash.   Allergic/Immunologic: Negative for immunocompromised state.   Neurological:  Negative for dizziness, syncope, weakness,  "light-headedness and headaches.   Hematological:  Negative for adenopathy. Does not bruise/bleed easily.   Psychiatric/Behavioral:  Positive for sleep disturbance (Chronic and intermittent). Negative for dysphoric mood and suicidal ideas. The patient is not nervous/anxious.          Objective:  Vitals:    02/29/24 1034   BP: 148/76   BP Location: Right arm   Patient Position: Sitting   Cuff Size: Adult   Pulse: 76   Resp: 16   Temp: 98 °F (36.7 °C)   TempSrc: Tympanic   SpO2: 98%   Weight: 55.8 kg (123 lb)   Height: 5' 2\" (1.575 m)     Body mass index is 22.5 kg/m².     Physical Exam  Vitals and nursing note reviewed.   Constitutional:       General: She is not in acute distress.     Appearance: She is well-developed.   HENT:      Head: Normocephalic and atraumatic.   Eyes:      Extraocular Movements: Extraocular movements intact.      Pupils: Pupils are equal, round, and reactive to light.   Neck:      Thyroid: No thyromegaly.      Vascular: No carotid bruit or JVD.   Cardiovascular:      Rate and Rhythm: Normal rate and regular rhythm.      Heart sounds: Murmur (Grade 2 or 6 systolic murmur best audible in the aortic area.) heard.   Pulmonary:      Effort: Pulmonary effort is normal. No respiratory distress.      Breath sounds: Normal breath sounds.   Musculoskeletal:      Cervical back: Neck supple.      Right lower leg: No edema.      Left lower leg: No edema.   Lymphadenopathy:      Cervical: No cervical adenopathy.   Skin:     General: Skin is warm and dry.      Findings: No rash.   Neurological:      General: No focal deficit present.      Mental Status: She is alert and oriented to person, place, and time. Mental status is at baseline.   Psychiatric:         Mood and Affect: Mood normal.         Behavior: Behavior normal.         Thought Content: Thought content normal.           RESULTS:  Cholesterol   Date/Time Value Ref Range Status   12/15/2023 09:37  See Comment mg/dL Final     Comment:     " Cholesterol:         Pediatric <18 Years        Desirable          <170 mg/dL      Borderline High    170-199 mg/dL      High               >=200 mg/dL        Adult >=18 Years            Desirable        <200 mg/dL      Borderline High  200-239 mg/dL      High             >239 mg/dL       Triglycerides   Date/Time Value Ref Range Status   12/15/2023 09:37 AM 77 See Comment mg/dL Final     Comment:     Triglyceride:     0-9Y            <75mg/dL     10Y-17Y         <90 mg/dL       >=18Y     Normal          <150 mg/dL     Borderline High 150-199 mg/dL     High            200-499 mg/dL        Very High       >499 mg/dL    Specimen collection should occur prior to Metamizole administration due to the potential for falsely depressed results.     HDL, Direct   Date/Time Value Ref Range Status   12/15/2023 09:37 AM 58 >=50 mg/dL Final     LDL Calculated   Date/Time Value Ref Range Status   12/15/2023 09:37  (H) 0 - 100 mg/dL Final     Comment:     LDL Cholesterol:     Optimal           <100 mg/dl     Near Optimal      100-129 mg/dl     Above Optimal       Borderline High 130-159 mg/dl       High            160-189 mg/dl       Very High       >189 mg/dl         This screening LDL is a calculated result.   It does not have the accuracy of the Direct Measured LDL in the monitoring of patients with hyperlipidemia and/or statin therapy.   Direct Measure LDL (PPX474) must be ordered separately in these patients.     Hemoglobin   Date/Time Value Ref Range Status   01/14/2024 02:46 PM 13.5 11.5 - 15.4 g/dL Final     Hematocrit   Date/Time Value Ref Range Status   01/14/2024 02:46 PM 40.9 34.8 - 46.1 % Final     Platelets   Date/Time Value Ref Range Status   01/14/2024 02:46  149 - 390 Thousands/uL Final     TSH 3RD GENERATON   Date/Time Value Ref Range Status   12/15/2023 09:37 AM 3.659 0.450 - 4.500 uIU/mL Final     Comment:     The recommended reference ranges for TSH during pregnancy are as follows:   First trimester  0.100 to 2.500 uIU/mL   Second trimester  0.200 to 3.000 uIU/mL   Third trimester 0.300 to 3.000 uIU/m    Note: Normal ranges may not apply to patients who are transgender, non-binary, or whose legal sex, sex at birth, and gender identity differ.  Adult TSH (3rd generation) reference range follows the recommended guidelines of the American Thyroid Association, January, 2020.     Free T4   Date/Time Value Ref Range Status   12/15/2023 09:37 AM 1.14 (H) 0.61 - 1.12 ng/dL Final     Comment:     Specimens with biotin concentrations > 10 ng/mL can lead to significant (> 10%) positive bias in result.     Sodium   Date/Time Value Ref Range Status   01/14/2024 02:46  135 - 147 mmol/L Final     BUN   Date/Time Value Ref Range Status   01/14/2024 02:46 PM 16 5 - 25 mg/dL Final     Creatinine   Date/Time Value Ref Range Status   01/14/2024 02:46 PM 0.96 0.60 - 1.30 mg/dL Final     Comment:     Standardized to IDMS reference method      In chart    This note was created with voice recognition software.  Phonic, grammatical and spelling errors may be present within the note as a result.

## 2024-02-29 NOTE — PATIENT INSTRUCTIONS
We will increase your losartan dose from 50 mg daily to 100 mg daily.  You currently have 50 mg tablets, you can finish them by taking 2 tablets daily until you run out and then you can start the new 100 mg tablet daily.  Continue other medications the same at this time.  Would like you back for a blood pressure check by the nursing staff in 1 month.  Schedule follow-up here with me in 4 months with repeat labs for that visit.  Can apply steroid cream to facial rash twice a day until gone.  Do not get near her eyes.   none

## 2024-05-01 DIAGNOSIS — I48.0 PAROXYSMAL ATRIAL FIBRILLATION (HCC): Primary | ICD-10-CM

## 2024-05-01 NOTE — TELEPHONE ENCOUNTER
Patient requesting a refill on: Eliquis 5mg    Please send order to [facility] pharmacy.

## 2024-05-02 ENCOUNTER — HOSPITAL ENCOUNTER (OUTPATIENT)
Dept: VASCULAR ULTRASOUND | Facility: HOSPITAL | Age: 79
Discharge: HOME | End: 2024-05-02
Payer: COMMERCIAL

## 2024-05-02 DIAGNOSIS — I65.22 CAROTID STENOSIS, ASYMPTOMATIC, LEFT: ICD-10-CM

## 2024-05-02 PROCEDURE — 93880 EXTRACRANIAL BILAT STUDY: CPT

## 2024-05-02 PROCEDURE — 93880 EXTRACRANIAL BILAT STUDY: CPT | Performed by: SURGERY

## 2024-05-29 ENCOUNTER — RA CDI HCC (OUTPATIENT)
Dept: OTHER | Facility: HOSPITAL | Age: 79
End: 2024-05-29

## 2024-06-06 ENCOUNTER — OFFICE VISIT (OUTPATIENT)
Dept: INTERNAL MEDICINE CLINIC | Facility: CLINIC | Age: 79
End: 2024-06-06
Payer: COMMERCIAL

## 2024-06-06 VITALS
DIASTOLIC BLOOD PRESSURE: 82 MMHG | HEIGHT: 62 IN | HEART RATE: 88 BPM | SYSTOLIC BLOOD PRESSURE: 138 MMHG | BODY MASS INDEX: 23.19 KG/M2 | OXYGEN SATURATION: 97 % | WEIGHT: 126 LBS

## 2024-06-06 DIAGNOSIS — Z12.31 ENCOUNTER FOR SCREENING MAMMOGRAM FOR MALIGNANT NEOPLASM OF BREAST: ICD-10-CM

## 2024-06-06 DIAGNOSIS — Z85.038 HISTORY OF COLON CANCER: ICD-10-CM

## 2024-06-06 DIAGNOSIS — I48.0 PAROXYSMAL ATRIAL FIBRILLATION (HCC): ICD-10-CM

## 2024-06-06 DIAGNOSIS — Z00.00 ENCOUNTER FOR MEDICARE ANNUAL WELLNESS EXAM: Primary | ICD-10-CM

## 2024-06-06 DIAGNOSIS — Z78.0 POST-MENOPAUSAL: ICD-10-CM

## 2024-06-06 PROCEDURE — G0439 PPPS, SUBSEQ VISIT: HCPCS | Performed by: PHYSICIAN ASSISTANT

## 2024-06-06 RX ORDER — LOSARTAN POTASSIUM 50 MG/1
50 TABLET ORAL DAILY
COMMUNITY
Start: 2024-05-03

## 2024-06-06 NOTE — PATIENT INSTRUCTIONS
Have my lab work done, we will plan to review lab work in 6 weeks.  Follow-up with vascular on July 1.  Will refer you for repeat colonoscopy to GI.  Will have you do mammogram and bone density.          Medicare Preventive Visit Patient Instructions  Thank you for completing your Welcome to Medicare Visit or Medicare Annual Wellness Visit today. Your next wellness visit will be due in one year (6/7/2025).  The screening/preventive services that you may require over the next 5-10 years are detailed below. Some tests may not apply to you based off risk factors and/or age. Screening tests ordered at today's visit but not completed yet may show as past due. Also, please note that scanned in results may not display below.  Preventive Screenings:  Service Recommendations Previous Testing/Comments   Colorectal Cancer Screening  * Colonoscopy    * Fecal Occult Blood Test (FOBT)/Fecal Immunochemical Test (FIT)  * Fecal DNA/Cologuard Test  * Flexible Sigmoidoscopy Age: 45-75 years old   Colonoscopy: every 10 years (may be performed more frequently if at higher risk)  OR  FOBT/FIT: every 1 year  OR  Cologuard: every 3 years  OR  Sigmoidoscopy: every 5 years  Screening may be recommended earlier than age 45 if at higher risk for colorectal cancer. Also, an individualized decision between you and your healthcare provider will decide whether screening between the ages of 76-85 would be appropriate. Colonoscopy: Not on file  FOBT/FIT: Not on file  Cologuard: Not on file  Sigmoidoscopy: Not on file    History Colorectal Cancer     Breast Cancer Screening Age: 40+ years old  Frequency: every 1-2 years  Not required if history of left and right mastectomy Mammogram: Not on file        Cervical Cancer Screening Between the ages of 21-29, pap smear recommended once every 3 years.   Between the ages of 30-65, can perform pap smear with HPV co-testing every 5 years.   Recommendations may differ for women with a history of total  hysterectomy, cervical cancer, or abnormal pap smears in past. Pap Smear: Not on file    Screening Not Indicated   Hepatitis C Screening Once for adults born between 1945 and 1965  More frequently in patients at high risk for Hepatitis C Hep C Antibody: Not on file        Diabetes Screening 1-2 times per year if you're at risk for diabetes or have pre-diabetes Fasting glucose: 109 mg/dL (12/15/2023)  A1C: No results in last 5 years (No results in last 5 years)  Screening Current   Cholesterol Screening Once every 5 years if you don't have a lipid disorder. May order more often based on risk factors. Lipid panel: 12/15/2023    Screening Current     Other Preventive Screenings Covered by Medicare:  Abdominal Aortic Aneurysm (AAA) Screening: covered once if your at risk. You're considered to be at risk if you have a family history of AAA.  Lung Cancer Screening: covers low dose CT scan once per year if you meet all of the following conditions: (1) Age 55-77; (2) No signs or symptoms of lung cancer; (3) Current smoker or have quit smoking within the last 15 years; (4) You have a tobacco smoking history of at least 20 pack years (packs per day multiplied by number of years you smoked); (5) You get a written order from a healthcare provider.  Glaucoma Screening: covered annually if you're considered high risk: (1) You have diabetes OR (2) Family history of glaucoma OR (3)  aged 50 and older OR (4)  American aged 65 and older  Osteoporosis Screening: covered every 2 years if you meet one of the following conditions: (1) You're estrogen deficient and at risk for osteoporosis based off medical history and other findings; (2) Have a vertebral abnormality; (3) On glucocorticoid therapy for more than 3 months; (4) Have primary hyperparathyroidism; (5) On osteoporosis medications and need to assess response to drug therapy.   Last bone density test (DXA Scan): Not on file.  HIV Screening: covered annually  if you're between the age of 15-65. Also covered annually if you are younger than 15 and older than 65 with risk factors for HIV infection. For pregnant patients, it is covered up to 3 times per pregnancy.    Immunizations:  Immunization Recommendations   Influenza Vaccine Annual influenza vaccination during flu season is recommended for all persons aged >= 6 months who do not have contraindications   Pneumococcal Vaccine   * Pneumococcal conjugate vaccine = PCV13 (Prevnar 13), PCV15 (Vaxneuvance), PCV20 (Prevnar 20)  * Pneumococcal polysaccharide vaccine = PPSV23 (Pneumovax) Adults 19-63 yo with certain risk factors or if 65+ yo  If never received any pneumonia vaccine: recommend Prevnar 20 (PCV20)  Give PCV20 if previously received 1 dose of PCV13 or PPSV23   Hepatitis B Vaccine 3 dose series if at intermediate or high risk (ex: diabetes, end stage renal disease, liver disease)   Respiratory syncytial virus (RSV) Vaccine - COVERED BY MEDICARE PART D  * RSVPreF3 (Arexvy) CDC recommends that adults 60 years of age and older may receive a single dose of RSV vaccine using shared clinical decision-making (SCDM)   Tetanus (Td) Vaccine - COST NOT COVERED BY MEDICARE PART B Following completion of primary series, a booster dose should be given every 10 years to maintain immunity against tetanus. Td may also be given as tetanus wound prophylaxis.   Tdap Vaccine - COST NOT COVERED BY MEDICARE PART B Recommended at least once for all adults. For pregnant patients, recommended with each pregnancy.   Shingles Vaccine (Shingrix) - COST NOT COVERED BY MEDICARE PART B  2 shot series recommended in those 19 years and older who have or will have weakened immune systems or those 50 years and older     Health Maintenance Due:      Topic Date Due    Hepatitis C Screening  Never done     Immunizations Due:      Topic Date Due    Pneumococcal Vaccine: 65+ Years (1 of 2 - PCV) Never done    COVID-19 Vaccine (1 - 2023-24 season) Never  done    Influenza Vaccine (Season Ended) 09/01/2024     Advance Directives   What are advance directives?  Advance directives are legal documents that state your wishes and plans for medical care. These plans are made ahead of time in case you lose your ability to make decisions for yourself. Advance directives can apply to any medical decision, such as the treatments you want, and if you want to donate organs.   What are the types of advance directives?  There are many types of advance directives, and each state has rules about how to use them. You may choose a combination of any of the following:  Living will:  This is a written record of the treatment you want. You can also choose which treatments you do not want, which to limit, and which to stop at a certain time. This includes surgery, medicine, IV fluid, and tube feedings.   Durable power of  for healthcare (DPAHC):  This is a written record that states who you want to make healthcare choices for you when you are unable to make them for yourself. This person, called a proxy, is usually a family member or a friend. You may choose more than 1 proxy.  Do not resuscitate (DNR) order:  A DNR order is used in case your heart stops beating or you stop breathing. It is a request not to have certain forms of treatment, such as CPR. A DNR order may be included in other types of advance directives.  Medical directive:  This covers the care that you want if you are in a coma, near death, or unable to make decisions for yourself. You can list the treatments you want for each condition. Treatment may include pain medicine, surgery, blood transfusions, dialysis, IV or tube feedings, and a ventilator (breathing machine).  Values history:  This document has questions about your views, beliefs, and how you feel and think about life. This information can help others choose the care that you would choose.  Why are advance directives important?  An advance directive helps  you control your care. Although spoken wishes may be used, it is better to have your wishes written down. Spoken wishes can be misunderstood, or not followed. Treatments may be given even if you do not want them. An advance directive may make it easier for your family to make difficult choices about your care.       © Copyright [employer] 2018 Information is for End User's use only and may not be sold, redistributed or otherwise used for commercial purposes. All illustrations and images included in CareNotes® are the copyrighted property of [employer] or [employer]

## 2024-06-06 NOTE — PROGRESS NOTES
Ambulatory Visit  Name: Glendy Pete      : 1945      MRN: [id number]  Encounter Provider: Darrel Mathew PA-C  Encounter Date: 2024   Encounter department: [facility] INTERNAL MEDICINE [place]    Assessment & Plan   1. Encounter for Medicare annual wellness exam  2. Paroxysmal atrial fibrillation (HCC)  3. Post-menopausal  -     DXA bone density spine hip and pelvis; Future; Expected date: 2024  4. History of colon cancer  -     Ambulatory referral to Gastroenterology; Future  5. Encounter for screening mammogram for malignant neoplasm of breast  -     Mammo screening bilateral w 3d & cad; Future      Have my lab work done, we will plan to review lab work in 6 weeks.  Follow-up with vascular on .  Will refer you for repeat colonoscopy to GI.  Will have you do mammogram and bone density    Depression Screening and Follow-up Plan: Patient was screened for depression during today's encounter. They screened negative with a PHQ-2 score of 0.    Preventive health issues were discussed with patient, and age appropriate screening tests were ordered as noted in patient's After Visit Summary. Personalized health advice and appropriate referrals for health education or preventive services given if needed, as noted in patient's After Visit Summary.    History of Present Illness     78-year-old female presents for her annual Medicare wellness visit.  In review patient will need to be set up for colonoscopy as she does have past history of colon cancer, has moved to the area from [place] and has not had colonoscopy since doing so.  Patient will also need referral for mammogram and bone density.    Patient forgot to have her labs done for this visit.    Patient has history of paroxysmal atrial fibrillation: She does follow with cardiology.  Patient  is on Eliquis.    Hyperlipidemia: On atorvastatin.    Hypertension: On losartan.  Also metoprolol XL 25 mg daily.  Patient will continue these  medications.    Hypothyroid: On [place] Thyroid 15 mg daily.     Patient Care Team:  Darrel Mathew PA-C as PCP - General (Internal Medicine)  Darrel Mathew PA-C as PCP - PCP-[facility] ([facility])    Review of Systems   Constitutional:  Negative for activity change, chills, fatigue and fever.   HENT:  Negative for congestion.    Eyes:  Negative for discharge.   Respiratory:  Negative for cough, chest tightness and shortness of breath.    Cardiovascular:  Negative for chest pain, palpitations and leg swelling.   Gastrointestinal:  Negative for abdominal pain, blood in stool, constipation, diarrhea, nausea and vomiting.   Endocrine: Negative for polydipsia, polyphagia and polyuria.   Genitourinary:  Negative for difficulty urinating.   Musculoskeletal:  Negative for arthralgias and myalgias.   Skin:  Negative for rash.   Allergic/Immunologic: Negative for immunocompromised state.   Neurological:  Negative for dizziness, syncope, weakness, light-headedness and headaches.   Hematological:  Negative for adenopathy. Does not bruise/bleed easily.   Psychiatric/Behavioral:  Negative for dysphoric mood, sleep disturbance and suicidal ideas. The patient is not nervous/anxious.      Medical History Reviewed by provider this encounter:       Annual Wellness Visit Questionnaire   Glendy is here for her Subsequent Wellness visit. Last Medicare Wellness visit information reviewed, patient interviewed and updates made to the record today.      Health Risk Assessment:   Patient rates overall health as excellent. Patient feels that their physical health rating is same. Patient is very satisfied with their life. Eyesight was rated as same. Hearing was rated as slightly worse. Patient feels that their emotional and mental health rating is same. Patients states they are never, rarely angry. Patient states they are never, rarely unusually tired/fatigued. Pain experienced in the last 7 days has been none. Patient states that she has  experienced no weight loss or gain in last 6 months.     Depression Screening:   PHQ-2 Score: 0      Fall Risk Screening:   In the past year, patient has experienced: no history of falling in past year      Urinary Incontinence Screening:   Patient has not leaked urine accidently in the last six months.     Home Safety:  Patient does not have trouble with stairs inside or outside of their home. Patient has working smoke alarms and has no working carbon monoxide detector. Home safety hazards include: none.     Nutrition:   Current diet is Regular.     Medications:   Patient is not currently taking any over-the-counter supplements. Patient is able to manage medications.     Activities of Daily Living (ADLs)/Instrumental Activities of Daily Living (IADLs):   Walk and transfer into and out of bed and chair?: Yes  Dress and groom yourself?: Yes    Bathe or shower yourself?: Yes    Feed yourself? Yes  Do your laundry/housekeeping?: Yes  Manage your money, pay your bills and track your expenses?: Yes  Make your own meals?: Yes    Do your own shopping?: Yes    Previous Hospitalizations:   Any hospitalizations or ED visits within the last 12 months?: No      Advance Care Planning:   Living will: No    Durable POA for healthcare: No    Advanced directive: No    ACP document given: Yes      Cognitive Screening:   Provider or family/friend/caregiver concerned regarding cognition?: No    PREVENTIVE SCREENINGS      Cardiovascular Screening:    General: Screening Current    Due for: Lipid Panel      Diabetes Screening:     General: Screening Current    Due for: Blood Glucose      Colorectal Cancer Screening:     General: History Colorectal Cancer    Due for: Colonoscopy - High Risk      Breast Cancer Screening:       Due for: Mammogram        Cervical Cancer Screening:    General: Screening Not Indicated      Osteoporosis Screening:      Due for: Bone Density Ultrasound      Abdominal Aortic Aneurysm (AAA) Screening:         "General: Screening Not Indicated      Lung Cancer Screening:     General: Screening Not Indicated      Hepatitis C Screening:    General: Screening Not Indicated    Screening, Brief Intervention, and Referral to Treatment (SBIRT)    Screening  Typical number of drinks in a day: 0  Typical number of drinks in a week: 0  Interpretation: Low risk drinking behavior.    Single Item Drug Screening:  How often have you used an illegal drug (including marijuana) or a prescription medication for non-medical reasons in the past year? never    Single Item Drug Screen Score: 0  Interpretation: Negative screen for possible drug use disorder    Brief Intervention  Alcohol & drug use screenings were reviewed. No concerns regarding substance use disorder identified.     Social Determinants of Health     Financial Resource Strain: Low Risk  (5/26/2023)    Overall Financial Resource Strain (CARDIA)    • Difficulty of Paying Living Expenses: Not hard at all   Food Insecurity: No Food Insecurity (6/6/2024)    Hunger Vital Sign    • Worried About Running Out of Food in the Last Year: Never true    • Ran Out of Food in the Last Year: Never true   Transportation Needs: No Transportation Needs (6/6/2024)    PRAPARE - Transportation    • Lack of Transportation (Medical): No    • Lack of Transportation (Non-Medical): No   Housing Stability: Low Risk  (6/6/2024)    Housing Stability Vital Sign    • Unable to Pay for Housing in the Last Year: No    • Number of Times Moved in the Last Year: 0    • Homeless in the Last Year: No   Utilities: Not At Risk (6/6/2024)    [facility] Utilities    • Threatened with loss of utilities: No     No results found.    Objective     /82 (BP Location: Right arm, Patient Position: Sitting, Cuff Size: Standard)   Pulse 88   Ht 5' 2\" (1.575 m)   Wt 57.2 kg (126 lb)   SpO2 97%   BMI 23.05 kg/m²     Physical Exam  Constitutional:       General: She is not in acute distress.     Appearance: Normal appearance. She " is not ill-appearing.   HENT:      Head: Normocephalic.   Eyes:      Extraocular Movements: Extraocular movements intact.      Pupils: Pupils are equal, round, and reactive to light.   Neck:      Thyroid: No thyromegaly.      Vascular: Carotid bruit (Bilateral carotid bruits) present.      Trachea: Trachea normal.   Cardiovascular:      Rate and Rhythm: Normal rate and regular rhythm.      Heart sounds: No murmur heard.  Pulmonary:      Effort: Pulmonary effort is normal. No respiratory distress.      Breath sounds: Normal breath sounds.   Musculoskeletal:         General: No swelling.      Cervical back: Neck supple.      Right lower leg: No edema.      Left lower leg: No edema.   Lymphadenopathy:      Cervical: No cervical adenopathy.   Skin:     General: Skin is warm and dry.      Findings: No rash.   Neurological:      General: No focal deficit present.      Mental Status: She is alert and oriented to person, place, and time. Mental status is at baseline.   Psychiatric:         Mood and Affect: Mood normal.         Behavior: Behavior normal.     Administrative Statements

## 2024-06-29 DIAGNOSIS — R21 FACIAL RASH: ICD-10-CM

## 2024-06-29 RX ORDER — TRIAMCINOLONE ACETONIDE 1 MG/G
1 CREAM TOPICAL 2 TIMES DAILY
Qty: 30 G | Refills: 0 | Status: SHIPPED | OUTPATIENT
Start: 2024-06-29

## 2024-07-01 ENCOUNTER — OFFICE VISIT (OUTPATIENT)
Dept: VASCULAR SURGERY | Facility: CLINIC | Age: 79
End: 2024-07-01
Payer: COMMERCIAL

## 2024-07-01 VITALS
BODY MASS INDEX: 23.19 KG/M2 | HEIGHT: 62 IN | HEART RATE: 62 BPM | SYSTOLIC BLOOD PRESSURE: 132 MMHG | DIASTOLIC BLOOD PRESSURE: 76 MMHG | WEIGHT: 126 LBS

## 2024-07-01 DIAGNOSIS — I65.22 CAROTID STENOSIS, ASYMPTOMATIC, LEFT: ICD-10-CM

## 2024-07-01 DIAGNOSIS — I65.22 ATHEROSCLEROSIS OF LEFT CAROTID ARTERY: Primary | ICD-10-CM

## 2024-07-01 PROCEDURE — 99214 OFFICE O/P EST MOD 30 MIN: CPT | Performed by: NURSE PRACTITIONER

## 2024-07-01 NOTE — ASSESSMENT & PLAN NOTE
- presents without complaints  - Denies TIA or strokelike symptoms  - Neuroexam intact  - Carotid duplex 5/3/2024 reviewed.  With increase in L ICA PSV and ratio.  RIGHT:  <50% stenosis noted in the internal carotid artery. There is no significant subclavian artery disease.     LEFT:  >70% stenosis noted in the internal carotid artery. There is no significant subclavian artery disease. Velocities 312/38 ratio 4.96    sCr 0.96/eGFR 56 (1/14/24)    Plan:  -CTA head and neck  -[facility]  -Return to office with vascular surgeon to review CTA and make further recommendations if indicated  -Continue Eliquis (PAF)  -Continue statin  -Recommend initiation of antiplatelet with aspirin 81 mg for OMT if not otherwise contraindicated  -Educated on TIA and strokelike symptoms and when to seek medical attention

## 2024-07-01 NOTE — PROGRESS NOTES
Assessment/Plan:     78-year-old female non-smoker with HTN, hypothyroid, asthma, h/o colon CA, pAfib (Eliquis), PPM, moderate aortic stenosis and asymptomatic bilateral carotid artery stenosis L>R presents to review imaging.    Problem List Items Addressed This Visit       Carotid stenosis, asymptomatic, left     - presents without complaints  - Denies TIA or strokelike symptoms  - Neuroexam intact  - Carotid duplex 5/3/2024 reviewed.  With increase in L ICA PSV and ratio.  RIGHT:  <50% stenosis noted in the internal carotid artery. There is no significant subclavian artery disease.     LEFT:  >70% stenosis noted in the internal carotid artery. There is no significant subclavian artery disease. Velocities 312/38 ratio 4.96    sCr 0.96/eGFR 56 (1/14/24)    Plan:  -CTA head and neck  -[facility]  -Return to office with vascular surgeon to review CTA and make further recommendations if indicated  -Continue Eliquis (PAF)  -Continue statin  -Recommend initiation of antiplatelet with aspirin 81 mg for OMT if not otherwise contraindicated  -Educated on TIA and strokelike symptoms and when to seek medical attention         Relevant Orders    Basic metabolic panel    CTA head and neck w wo contrast     Other Visit Diagnoses       Atherosclerosis of left carotid artery    -  Primary    Relevant Orders    Basic metabolic panel    CTA head and neck w wo contrast                 Diagnoses and all orders for this visit:    Atherosclerosis of left carotid artery  -     Basic metabolic panel; Future  -     CTA head and neck w wo contrast; Future    Carotid stenosis, asymptomatic, left  -     Basic metabolic panel; Future  -     CTA head and neck w wo contrast; Future          Subjective:     Patient ID: Glendy Pete is a 78 y.o. female.    Patient presents to review CV. Denies s/s CVA.     HPI  See assessment plan    78-year-old female presents to review carotid duplex imaging.  She presents without complaints.  Denies TIA or strokelike  symptoms.  Imaging reviewed.  Known L ICA stenosis with slight increase in velocities.  Discussed pathophysiology of carotid artery stenosis and indication for CTA imaging.  Although end-diastolic velocities remain low, at this time would recommend a baseline CTA given her increase in PSV and ratio values.    She is maintained on Eliquis (A-fib) and statin.  Discussed initiating antiplatelet with aspirin 81 mg    Educated on TIA and strokelike symptoms and when to seek medical attention.    Per review of chart kidney function stable.  Will get updated BMP prior to CTA.  Will have patient follow-up with vascular surgeon after CTA to review and make further recommendations if indicated.        Review of Systems   Constitutional: Negative.    HENT: Negative.     Eyes: Negative.    Respiratory: Negative.     Cardiovascular: Negative.    Gastrointestinal: Negative.    Endocrine: Negative.    Genitourinary: Negative.    Musculoskeletal: Negative.    Skin: Negative.    Allergic/Immunologic: Negative.    Neurological: Negative.    Hematological: Negative.    Psychiatric/Behavioral: Negative.         I have reviewed and made appropriate changes to the review of systems input by the medical assistant.    Objective:     Physical Exam  Vitals reviewed.   Constitutional:       General: She is not in acute distress.     Appearance: Normal appearance. She is normal weight.   HENT:      Head: Normocephalic and atraumatic.   Neck:      Vascular: Carotid bruit present.   Cardiovascular:      Pulses: Normal pulses.           Carotid pulses are 2+ on the right side and 2+ on the left side.       Radial pulses are 2+ on the right side and 2+ on the left side.      Heart sounds: Murmur heard.   Pulmonary:      Effort: Pulmonary effort is normal. No respiratory distress.      Breath sounds: Normal breath sounds. No wheezing.   Musculoskeletal:         General: Normal range of motion.      Cervical back: Normal range of motion.      Right  "lower leg: No edema.      Left lower leg: No edema.   Skin:     General: Skin is warm.      Capillary Refill: Capillary refill takes less than 2 seconds.   Neurological:      General: No focal deficit present.      Mental Status: She is alert and oriented to person, place, and time.      Sensory: No sensory deficit.      Motor: No weakness.   Psychiatric:         Behavior: Behavior normal.         I have spent a total time of 30 minutes in caring for this patient on the day of the visit/encounter including Diagnostic results, Risks and benefits of tx options, Instructions for management, Patient and family education, Risk factor reductions, Impressions, Documenting in the medical record, Reviewing / ordering tests, medicine, procedures  , and Obtaining or reviewing history  .      Vitals:    07/01/24 1448   BP: 132/76   BP Location: Right arm   Patient Position: Sitting   Cuff Size: Standard   Pulse: 62   Weight: 57.2 kg (126 lb)   Height: 5' 2\" (1.575 m)       Patient Active Problem List   Diagnosis    History of melanoma    Hypothyroid    Primary hypertension    Mild intermittent asthma without complication    Presence of permanent cardiac pacemaker    Hearing impairment    History of colon cancer    Carotid stenosis, asymptomatic, left    Stage 3a chronic kidney disease (HCC)    Moderate aortic stenosis    Paroxysmal atrial fibrillation (HCC)       Past Surgical History:   Procedure Laterality Date    CARDIAC PACEMAKER PLACEMENT  2020    COLON SURGERY      CRANIOTOMY Right 1989    R bleed    SKIN CANCER EXCISION Right     Melanoma R arm       Family History   Problem Relation Age of Onset    Heart disease Mother     COPD Sister     Emphysema Sister     Colon cancer Brother     Liver cancer Paternal Grandfather        Social History     Socioeconomic History    Marital status: Single     Spouse name: Not on file    Number of children: Not on file    Years of education: Not on file    Highest education level: Not " on file   Occupational History    Not on file   Tobacco Use    Smoking status: Never    Smokeless tobacco: Never   Vaping Use    Vaping status: Never Used   Substance and Sexual Activity    Alcohol use: Not Currently    Drug use: Never    Sexual activity: Not Currently   Other Topics Concern    Not on file   Social History Narrative    Divorsed        1 child    Hobbies-art    Reg dental care, brushes twice daily     Social Determinants of Health     Financial Resource Strain: Low Risk  (5/26/2023)    Overall Financial Resource Strain (CARDIA)     Difficulty of Paying Living Expenses: Not hard at all   Food Insecurity: No Food Insecurity (6/6/2024)    Hunger Vital Sign     Worried About Running Out of Food in the Last Year: Never true     Ran Out of Food in the Last Year: Never true   Transportation Needs: No Transportation Needs (6/6/2024)    PRAPARE - Transportation     Lack of Transportation (Medical): No     Lack of Transportation (Non-Medical): No   Physical Activity: Not on file   Stress: Not on file   Social Connections: Not on file   Intimate Partner Violence: Not on file   Housing Stability: Low Risk  (6/6/2024)    Housing Stability Vital Sign     Unable to Pay for Housing in the Last Year: No     Number of Times Moved in the Last Year: 0     Homeless in the Last Year: No       Allergies   Allergen Reactions    Codeine Nausea Only         Current Outpatient Medications:     albuterol (PROVENTIL HFA,VENTOLIN HFA) 90 mcg/act inhaler, Inhale 2 puffs every 6 (six) hours as needed for wheezing, Disp: 18 g, Rfl: 5    apixaban (ELIQUIS) 5 mg, Take 1 tablet (5 mg total) by mouth 2 (two) times a day, Disp: 60 tablet, Rfl: 5    atorvastatin (LIPITOR) 20 mg tablet, Take 1 tablet (20 mg total) by mouth daily, Disp: 90 tablet, Rfl: 0    thyroid (ARMOUR) 15 MG tablet, Take by mouth, Disp: , Rfl:     Fluticasone Furoate-Vilanterol 100-25 mcg/actuation inhaler, Inhale 1 puff daily Rinse mouth after use.  (Patient not taking: Reported on 6/6/2024), Disp: , Rfl:     losartan (COZAAR) 100 MG tablet, Take 1 tablet (100 mg total) by mouth daily (Patient not taking: Reported on 7/1/2024), Disp: 90 tablet, Rfl: 1    losartan (COZAAR) 50 mg tablet, Take 50 mg by mouth daily (Patient not taking: Reported on 7/1/2024), Disp: , Rfl:     metoprolol succinate (TOPROL-XL) 25 mg 24 hr tablet, Take 1 tablet (25 mg total) by mouth daily (Patient not taking: Reported on 7/1/2024), Disp: 90 tablet, Rfl: 3    montelukast (SINGULAIR) 10 mg tablet, Take 10 mg by mouth (Patient not taking: Reported on 7/1/2024), Disp: , Rfl:     ondansetron (Zofran ODT) 4 mg disintegrating tablet, Take 1 tablet (4 mg total) by mouth every 6 (six) hours as needed for nausea or vomiting (Patient not taking: Reported on 7/1/2024), Disp: 20 tablet, Rfl: 0    temazepam (RESTORIL) 15 mg capsule, Take 15 mg by mouth daily at bedtime as needed for sleep (Patient not taking: Reported on 7/1/2024), Disp: , Rfl:     triamcinolone (KENALOG) 0.1 % cream, APPLY TO AFFECTED AREA TWICE A DAY (Patient not taking: Reported on 7/1/2024), Disp: 30 g, Rfl: 0    Current Facility-Administered Medications:     lidocaine (XYLOCAINE) 4 % topical solution 5 mL, 5 mL, Topical, Once, Geno Baez PA-C

## 2024-07-01 NOTE — PATIENT INSTRUCTIONS
CTA head and neck   BMP  Return to office with vascular surgeon after CTA to review and make further recommendations if indicated    Call 911 or go to the ED with TIA or strokelike symptoms that we discussed in office today.   Implemented All Universal Safety Interventions:  [place] to call system. Call bell, personal items and telephone within reach. Instruct patient to call for assistance. Room bathroom lighting operational. Non-slip footwear when patient is off stretcher. Physically safe environment: no spills, clutter or unnecessary equipment. Stretcher in lowest position, wheels locked, appropriate side rails in place.

## 2024-07-08 ENCOUNTER — RA CDI HCC (OUTPATIENT)
Dept: OTHER | Facility: HOSPITAL | Age: 79
End: 2024-07-08

## 2024-07-11 ENCOUNTER — APPOINTMENT (OUTPATIENT)
Dept: LAB | Facility: HOSPITAL | Age: 79
End: 2024-07-11
Payer: COMMERCIAL

## 2024-07-11 DIAGNOSIS — I65.22 CAROTID STENOSIS, ASYMPTOMATIC, LEFT: ICD-10-CM

## 2024-07-11 DIAGNOSIS — I10 PRIMARY HYPERTENSION: ICD-10-CM

## 2024-07-11 DIAGNOSIS — E03.9 HYPOTHYROIDISM, UNSPECIFIED TYPE: ICD-10-CM

## 2024-07-11 LAB
ALBUMIN SERPL BCG-MCNC: 4.1 G/DL (ref 3.5–5)
ALP SERPL-CCNC: 58 U/L (ref 34–104)
ALT SERPL W P-5'-P-CCNC: 11 U/L (ref 7–52)
ANION GAP SERPL CALCULATED.3IONS-SCNC: 6 MMOL/L (ref 4–13)
AST SERPL W P-5'-P-CCNC: 18 U/L (ref 13–39)
BILIRUB SERPL-MCNC: 0.45 MG/DL (ref 0.2–1)
BUN SERPL-MCNC: 13 MG/DL (ref 5–25)
CALCIUM SERPL-MCNC: 9.5 MG/DL (ref 8.4–10.2)
CHLORIDE SERPL-SCNC: 107 MMOL/L (ref 96–108)
CHOLEST SERPL-MCNC: 155 MG/DL
CO2 SERPL-SCNC: 28 MMOL/L (ref 21–32)
CREAT SERPL-MCNC: 0.9 MG/DL (ref 0.6–1.3)
GFR SERPL CREATININE-BSD FRML MDRD: 61 ML/MIN/1.73SQ M
GLUCOSE SERPL-MCNC: 91 MG/DL (ref 65–140)
HDLC SERPL-MCNC: 66 MG/DL
LDLC SERPL CALC-MCNC: 76 MG/DL (ref 0–100)
NONHDLC SERPL-MCNC: 89 MG/DL
POTASSIUM SERPL-SCNC: 4.3 MMOL/L (ref 3.5–5.3)
PROT SERPL-MCNC: 6.7 G/DL (ref 6.4–8.4)
SODIUM SERPL-SCNC: 141 MMOL/L (ref 135–147)
T4 FREE SERPL-MCNC: 0.66 NG/DL (ref 0.61–1.12)
TRIGL SERPL-MCNC: 63 MG/DL
TSH SERPL DL<=0.05 MIU/L-ACNC: 2.78 UIU/ML (ref 0.45–4.5)

## 2024-07-11 PROCEDURE — 80053 COMPREHEN METABOLIC PANEL: CPT

## 2024-07-11 PROCEDURE — 84443 ASSAY THYROID STIM HORMONE: CPT

## 2024-07-11 PROCEDURE — 84439 ASSAY OF FREE THYROXINE: CPT

## 2024-07-11 PROCEDURE — 36415 COLL VENOUS BLD VENIPUNCTURE: CPT

## 2024-07-11 PROCEDURE — 80061 LIPID PANEL: CPT

## 2024-07-12 ENCOUNTER — HOSPITAL ENCOUNTER (OUTPATIENT)
Dept: CT IMAGING | Facility: HOSPITAL | Age: 79
End: 2024-07-12
Payer: COMMERCIAL

## 2024-07-12 ENCOUNTER — HOSPITAL ENCOUNTER (OUTPATIENT)
Dept: CT IMAGING | Facility: CLINIC | Age: 79
Discharge: HOME | End: 2024-07-12

## 2024-07-12 DIAGNOSIS — I65.22 CAROTID STENOSIS, ASYMPTOMATIC, LEFT: ICD-10-CM

## 2024-07-12 DIAGNOSIS — I65.22 ATHEROSCLEROSIS OF LEFT CAROTID ARTERY: ICD-10-CM

## 2024-07-12 PROCEDURE — 70496 CT ANGIOGRAPHY HEAD: CPT

## 2024-07-12 PROCEDURE — 70498 CT ANGIOGRAPHY NECK: CPT

## 2024-07-12 RX ADMIN — IOHEXOL 85 ML: 350 INJECTION, SOLUTION INTRAVENOUS at 12:23

## 2024-07-12 NOTE — PROGRESS NOTES
Assessment/Plan:      Diagnoses and all orders for this visit:    Asymptomatic carotid artery stenosis without infarction, left  -     Case request operating room: ENDARTERECTOMY ARTERY CAROTID; Standing  -     Case request operating room: ENDARTERECTOMY ARTERY CAROTID  -     Ambulatory referral to Cardiology; Future    Carotid stenosis, asymptomatic, left    Other orders  -     Diet NPO; Sips with meds; Standing  -     Void on call to OR; Standing  -     Insert peripheral IV; Standing  -     Nursing Communication CHG bath, have staff wash entire body (neck down) per pre op bathing protocol. Routine, evening prior to, and day of surgery.; Standing  -     Nursing Communication Swab both nares with Povidone-Iodine solution, EXCLUDE if patient has shellfish/Iodine allergy, and replace with nasal alcohol swabstick. Routine, day of surgery, on call to OR.; Standing  -     chlorhexidine (PERIDEX) 0.12 % oral rinse 15 mL  -     Place sequential compression device; Standing  -     ceFAZolin (ANCEF) IVPB (premix in dextrose) 2,000 mg 50 mL        78-year-old female with asymptomatic high-grade left carotid artery stenosis. Elevated velocities with peak systolic velocity in the 300s, end-diastolic velocity normal however ratio greater than 4. CTA reveals high-grade left carotid artery stenosis. Recommend cardiology evaluation prior, she followed with a cardiologist in [place] however when moving her last year has not reestablished. Will plan for left carotid endarterectomy pending cardiology evaluation. She is deciding on that location to include the [facility], [facility], [facility]. Her daughter is a nurse and lives in [facility]. This may impact where she decides to have surgery.   Subjective:     Patient ID: Glendy Pete is a 78 y.o. female.    Patient presents today to review CTA head/neck done 7/12/24 and discuss possible surgery if indicated. Denies any s/s of CVA.     HPI  Glendy is a pleasant  78-year-old female who presents after duplex criteria identified elevated peak systolic velocity and elevated ratio in the left carotid artery.  She remained asymptomatic and specifically denies unilateral weakness or numbness to include the extremities and face.  She does not have a history of speech disturbance, temporary blindness or other lateralizing neurologic issue.  Though her CTA is not currently read, it appears to be greater than 80% stenotic upon my review.  This is in agreement with her ICA to CCA ratio of greater than 4.  We discussed the risks and benefits of surgery on asymptomatic carotid disease at length.  It is her wish to undergo carotid endarterectomy for stroke risk reduction.  She is to continue her aspirin and statin therapy.  She is on Eliquis for paroxysmal atrial fibrillation.  She will need to hold this 2 days prior to surgery.  A cardiology review is pending as she has relocated from [place].  She previously followed with a cardiologist in [place] however has not established in [place].    Review of Systems   Constitutional: Negative.    HENT:  Positive for hearing loss.    Eyes: Negative.    Respiratory: Negative.     Cardiovascular: Negative.    Gastrointestinal: Negative.    Endocrine: Negative.    Genitourinary: Negative.    Musculoskeletal: Negative.    Skin: Negative.    Allergic/Immunologic: Negative.    Neurological: Negative.    Hematological: Negative.    Psychiatric/Behavioral: Negative.           Objective:     Physical Exam  Constitutional:       Appearance: Normal appearance.   HENT:      Head: Normocephalic and atraumatic.      Nose: No congestion or rhinorrhea.   Eyes:      Extraocular Movements: Extraocular movements intact.      Pupils: Pupils are equal, round, and reactive to light.   Cardiovascular:      Rate and Rhythm: Normal rate and regular rhythm.      Pulses:           Radial pulses are 2+ on the right side and 2+ on the left side.   Pulmonary:       Effort: Pulmonary effort is normal.      Breath sounds: No stridor.   Abdominal:      General: There is no distension.      Tenderness: There is no abdominal tenderness.   Musculoskeletal:         General: No swelling. Normal range of motion.      Cervical back: Normal range of motion and neck supple.   Skin:     General: Skin is warm.      Coloration: Skin is not jaundiced.   Neurological:      General: No focal deficit present.      Mental Status: She is alert and oriented to person, place, and time.   Psychiatric:         Mood and Affect: Mood normal.         Behavior: Behavior normal.         Operative Scheduling Information:    [facility]    Physician:  Me    Surgery: L CEA    Urgency:  Standard    Level:  Level 3: Outpatients to be scheduled for elective surgery than can be delayed up to 4 weeks without reasonable expectation of detriment to patient    Case Length:  3 hours    Post-op Bed:  ICU    OR Table:  Standard    Equipment Needs:  None    Medication Instructions:  Aspirin:   Continue (do not hold)  Eliquis:  Hold for 2 days prior to procedure    Hydration:  No

## 2024-07-15 ENCOUNTER — OFFICE VISIT (OUTPATIENT)
Dept: VASCULAR SURGERY | Facility: CLINIC | Age: 79
End: 2024-07-15
Payer: COMMERCIAL

## 2024-07-15 VITALS
BODY MASS INDEX: 23.19 KG/M2 | SYSTOLIC BLOOD PRESSURE: 146 MMHG | DIASTOLIC BLOOD PRESSURE: 70 MMHG | HEIGHT: 62 IN | WEIGHT: 126 LBS | HEART RATE: 70 BPM

## 2024-07-15 DIAGNOSIS — I65.22 CAROTID STENOSIS, ASYMPTOMATIC, LEFT: ICD-10-CM

## 2024-07-15 DIAGNOSIS — I65.22 ASYMPTOMATIC CAROTID ARTERY STENOSIS WITHOUT INFARCTION, LEFT: Primary | ICD-10-CM

## 2024-07-15 PROCEDURE — 99215 OFFICE O/P EST HI 40 MIN: CPT | Performed by: SURGERY

## 2024-07-15 RX ORDER — CEFAZOLIN SODIUM 2 G/50ML
2000 SOLUTION INTRAVENOUS ONCE
OUTPATIENT
Start: 2024-07-15 | End: 2024-07-15

## 2024-07-15 RX ORDER — CHLORHEXIDINE GLUCONATE ORAL RINSE 1.2 MG/ML
15 SOLUTION DENTAL ONCE
OUTPATIENT
Start: 2024-07-15 | End: 2024-07-15

## 2024-07-15 NOTE — LETTER
July 15, 2024     Darrel Mathew PA-C  [id number]  [facility] [id number]    Patient: Glendy Pete   YOB: 1945   Date of Visit: 7/15/2024       Dear Dr. Mathew:    Thank you for referring Glendy Pete to me for evaluation. Below are my notes for this consultation.    If you have questions, please do not hesitate to call me. I look forward to following your patient along with you.         Sincerely,        Byron Almanza MD        CC: Glendy Pete    Byron Almanza MD  7/15/2024  4:08 PM  Sign when Signing Visit  Assessment/Plan:      Diagnoses and all orders for this visit:    Asymptomatic carotid artery stenosis without infarction, left  -     Case request operating room: ENDARTERECTOMY ARTERY CAROTID; Standing  -     Case request operating room: ENDARTERECTOMY ARTERY CAROTID  -     Ambulatory referral to Cardiology; Future    Carotid stenosis, asymptomatic, left    Other orders  -     Diet NPO; Sips with meds; Standing  -     Void on call to OR; Standing  -     Insert peripheral IV; Standing  -     Nursing Communication CHG bath, have staff wash entire body (neck down) per pre op bathing protocol. Routine, evening prior to, and day of surgery.; Standing  -     Nursing Communication Swab both nares with Povidone-Iodine solution, EXCLUDE if patient has shellfish/Iodine allergy, and replace with nasal alcohol swabstick. Routine, day of surgery, on call to OR.; Standing  -     chlorhexidine (PERIDEX) 0.12 % oral rinse 15 mL  -     Place sequential compression device; Standing  -     ceFAZolin (ANCEF) IVPB (premix in dextrose) 2,000 mg 50 mL        78-year-old female with asymptomatic high-grade left carotid artery stenosis. Elevated velocities with peak systolic velocity in the 300s, end-diastolic velocity normal however ratio greater than 4. CTA reveals high-grade left carotid artery stenosis. Recommend cardiology evaluation prior, she followed with a cardiologist in [place]  however when moving her last year has not reestablished. Will plan for left carotid endarterectomy pending cardiology evaluation. She is deciding on that location to include the [facility], [facility], [facility]. Her daughter is a nurse and lives in [facility]. This may impact where she decides to have surgery.   Subjective:     Patient ID: Glendy Pete is a 78 y.o. female.    Patient presents today to review CTA head/neck done 7/12/24 and discuss possible surgery if indicated. Denies any s/s of CVA.     ANAT Pike is a pleasant 78-year-old female who presents after duplex criteria identified elevated peak systolic velocity and elevated ratio in the left carotid artery.  She remained asymptomatic and specifically denies unilateral weakness or numbness to include the extremities and face.  She does not have a history of speech disturbance, temporary blindness or other lateralizing neurologic issue.  Though her CTA is not currently read, it appears to be greater than 80% stenotic upon my review.  This is in agreement with her ICA to CCA ratio of greater than 4.  We discussed the risks and benefits of surgery on asymptomatic carotid disease at length.  It is her wish to undergo carotid endarterectomy for stroke risk reduction.  She is to continue her aspirin and statin therapy.  She is on Eliquis for paroxysmal atrial fibrillation.  She will need to hold this 2 days prior to surgery.  A cardiology review is pending as she has relocated from [place].  She previously followed with a cardiologist in [place] however has not established in [place].    Review of Systems   Constitutional: Negative.    HENT:  Positive for hearing loss.    Eyes: Negative.    Respiratory: Negative.     Cardiovascular: Negative.    Gastrointestinal: Negative.    Endocrine: Negative.    Genitourinary: Negative.    Musculoskeletal: Negative.    Skin: Negative.    Allergic/Immunologic: Negative.    Neurological: Negative.     Hematological: Negative.    Psychiatric/Behavioral: Negative.           Objective:     Physical Exam  Constitutional:       Appearance: Normal appearance.   HENT:      Head: Normocephalic and atraumatic.      Nose: No congestion or rhinorrhea.   Eyes:      Extraocular Movements: Extraocular movements intact.      Pupils: Pupils are equal, round, and reactive to light.   Cardiovascular:      Rate and Rhythm: Normal rate and regular rhythm.      Pulses:           Radial pulses are 2+ on the right side and 2+ on the left side.   Pulmonary:      Effort: Pulmonary effort is normal.      Breath sounds: No stridor.   Abdominal:      General: There is no distension.      Tenderness: There is no abdominal tenderness.   Musculoskeletal:         General: No swelling. Normal range of motion.      Cervical back: Normal range of motion and neck supple.   Skin:     General: Skin is warm.      Coloration: Skin is not jaundiced.   Neurological:      General: No focal deficit present.      Mental Status: She is alert and oriented to person, place, and time.   Psychiatric:         Mood and Affect: Mood normal.         Behavior: Behavior normal.         Operative Scheduling Information:    Hospital:  [facility]    Physician:  Me    Surgery: L CEA    Urgency:  Standard    Level:  Level 3: Outpatients to be scheduled for elective surgery than can be delayed up to 4 weeks without reasonable expectation of detriment to patient    Case Length:  3 hours    Post-op Bed:  ICU    OR Table:  Standard    Equipment Needs:  None    Medication Instructions:  Aspirin:   Continue (do not hold)  Eliquis:  Hold for 2 days prior to procedure    Hydration:  No

## 2024-07-15 NOTE — PATIENT INSTRUCTIONS
1. Asymptomatic carotid artery stenosis without infarction, left  -     Case request operating room: ENDARTERECTOMY ARTERY CAROTID; Standing  -     Case request operating room: ENDARTERECTOMY ARTERY CAROTID  -     Ambulatory referral to Cardiology; Future  2. Carotid stenosis, asymptomatic, left  Assessment & Plan:  78-year-old female with asymptomatic high-grade left carotid artery stenosis.  Elevated velocities with peak systolic velocity in the 300s, end-diastolic velocity normal however ratio greater than 4.  CTA reveals high-grade left carotid artery stenosis.  Recommend cardiology evaluation prior, she followed with a cardiologist in [place] however when moving her last year has not reestablished.  Will plan for left carotid endarterectomy pending cardiology evaluation.  She is deciding on that location to include the [facility], [facility], [facility].  Her daughter is a nurse and lives in [facility].  This may impact where she decides to have surgery.

## 2024-07-15 NOTE — ASSESSMENT & PLAN NOTE
78-year-old female with asymptomatic high-grade left carotid artery stenosis.  Elevated velocities with peak systolic velocity in the 300s, end-diastolic velocity normal however ratio greater than 4.  CTA reveals high-grade left carotid artery stenosis.  Recommend cardiology evaluation prior, she followed with a cardiologist in [place] however when moving her last year has not reestablished.  Will plan for left carotid endarterectomy pending cardiology evaluation.  She is deciding on that location to include the [facility], [facility], [facility].  Her daughter is a nurse and lives in [facility].  This may impact where she decides to have surgery.

## 2024-07-16 ENCOUNTER — TELEPHONE (OUTPATIENT)
Dept: VASCULAR SURGERY | Facility: CLINIC | Age: 79
End: 2024-07-16

## 2024-07-16 NOTE — TELEPHONE ENCOUNTER
Operative Scheduling Information:     Hospital:  [facility]     Physician:  Me     Surgery: L CEA     Urgency:  Standard     Level:  Level 3: Outpatients to be scheduled for elective surgery than can be delayed up to 4 weeks without reasonable expectation of detriment to patient     Case Length:  3 hours     Post-op Bed:  ICU     OR Table:  Standard     Equipment Needs:  None     Medication Instructions:  Aspirin:   Continue (do not hold)  Eliquis:  Hold for 2 days prior to procedure     Hydration:  No           Instructions

## 2024-07-23 ENCOUNTER — HOSPITAL ENCOUNTER (OUTPATIENT)
Dept: NON INVASIVE DIAGNOSTICS | Facility: CLINIC | Age: 79
Discharge: HOME | End: 2024-07-23
Payer: COMMERCIAL

## 2024-07-23 ENCOUNTER — OFFICE VISIT (OUTPATIENT)
Dept: CARDIOLOGY CLINIC | Facility: CLINIC | Age: 79
End: 2024-07-23
Payer: COMMERCIAL

## 2024-07-23 VITALS
HEART RATE: 60 BPM | HEIGHT: 62 IN | WEIGHT: 126 LBS | DIASTOLIC BLOOD PRESSURE: 70 MMHG | BODY MASS INDEX: 23.19 KG/M2 | SYSTOLIC BLOOD PRESSURE: 146 MMHG

## 2024-07-23 VITALS
WEIGHT: 128 LBS | SYSTOLIC BLOOD PRESSURE: 172 MMHG | DIASTOLIC BLOOD PRESSURE: 94 MMHG | HEART RATE: 61 BPM | HEIGHT: 62 IN | OXYGEN SATURATION: 97 % | BODY MASS INDEX: 23.55 KG/M2 | RESPIRATION RATE: 16 BRPM

## 2024-07-23 DIAGNOSIS — I35.0 MODERATE AORTIC STENOSIS: ICD-10-CM

## 2024-07-23 DIAGNOSIS — I48.0 PAROXYSMAL ATRIAL FIBRILLATION (HCC): Primary | ICD-10-CM

## 2024-07-23 DIAGNOSIS — I35.0 AORTIC VALVE STENOSIS, ETIOLOGY OF CARDIAC VALVE DISEASE UNSPECIFIED: ICD-10-CM

## 2024-07-23 DIAGNOSIS — I65.22 ASYMPTOMATIC CAROTID ARTERY STENOSIS WITHOUT INFARCTION, LEFT: ICD-10-CM

## 2024-07-23 DIAGNOSIS — Z95.0 PACEMAKER: ICD-10-CM

## 2024-07-23 LAB
AORTIC ROOT: 3 CM
AORTIC VALVE MEAN VELOCITY: 30.1 M/S
APICAL FOUR CHAMBER EJECTION FRACTION: 49 %
AV AREA BY CONTINUOUS VTI: 0.6 CM2
AV AREA PEAK VELOCITY: 0.6 CM2
AV LVOT MEAN GRADIENT: 2 MMHG
AV LVOT PEAK GRADIENT: 3 MMHG
AV MEAN GRADIENT: 39 MMHG
AV PEAK GRADIENT: 60 MMHG
AV VALVE AREA: 0.59 CM2
AV VELOCITY RATIO: 0.22
BSA FOR ECHO PROCEDURE: 1.57 M2
DOP CALC AO PEAK VEL: 3.86 M/S
DOP CALC AO VTI: 104.59 CM
DOP CALC LVOT AREA: 2.54 CM2
DOP CALC LVOT CARDIAC INDEX: 2.36 L/MIN/M2
DOP CALC LVOT CARDIAC OUTPUT: 3.71 L/MIN
DOP CALC LVOT DIAMETER: 1.8 CM
DOP CALC LVOT PEAK VEL VTI: 24.32 CM
DOP CALC LVOT PEAK VEL: 0.85 M/S
DOP CALC LVOT STROKE INDEX: 39.5 ML/M2
DOP CALC LVOT STROKE VOLUME: 61.86
DOP CALC MV VTI: 51.36 CM
E WAVE DECELERATION TIME: 398 MS
E/A RATIO: 1.04
FRACTIONAL SHORTENING: 29 (ref 28–44)
INTERVENTRICULAR SEPTUM IN DIASTOLE (PARASTERNAL SHORT AXIS VIEW): 1.3 CM
INTERVENTRICULAR SEPTUM: 1.3 CM (ref 0.6–1.1)
LAAS-AP2: 19.8 CM2
LAAS-AP4: 18.4 CM2
LEFT ATRIUM AREA SYSTOLE SINGLE PLANE A4C: 19.5 CM2
LEFT ATRIUM SIZE: 3.8 CM
LEFT ATRIUM VOLUME (MOD BIPLANE): 54 ML
LEFT ATRIUM VOLUME INDEX (MOD BIPLANE): 34.4 ML/M2
LEFT INTERNAL DIMENSION IN SYSTOLE: 2.7 CM (ref 2.1–4)
LEFT VENTRICULAR INTERNAL DIMENSION IN DIASTOLE: 3.8 CM (ref 3.5–6)
LEFT VENTRICULAR POSTERIOR WALL IN END DIASTOLE: 1.2 CM
LEFT VENTRICULAR STROKE VOLUME: 36 ML
LVSV (TEICH): 36 ML
MV E'TISSUE VEL-SEP: 4 CM/S
MV MEAN GRADIENT: 3 MMHG
MV PEAK A VEL: 1.35 M/S
MV PEAK E VEL: 141 CM/S
MV PEAK GRADIENT: 7 MMHG
MV STENOSIS PRESSURE HALF TIME: 115 MS
MV VALVE AREA BY CONTINUITY EQUATION: 1.2 CM2
MV VALVE AREA P 1/2 METHOD: 1.91
RA PRESSURE ESTIMATED: 3 MMHG
RIGHT ATRIUM AREA SYSTOLE A4C: 9.4 CM2
RIGHT VENTRICLE ID DIMENSION: 2.8 CM
RV PSP: 44 MMHG
SL CV LEFT ATRIUM LENGTH A2C: 5.3 CM
SL CV LV EF: 60
SL CV PED ECHO LEFT VENTRICLE DIASTOLIC VOLUME (MOD BIPLANE) 2D: 62 ML
SL CV PED ECHO LEFT VENTRICLE SYSTOLIC VOLUME (MOD BIPLANE) 2D: 26 ML
TR MAX PG: 41 MMHG
TR PEAK VELOCITY: 3.2 M/S
TRICUSPID ANNULAR PLANE SYSTOLIC EXCURSION: 1.8 CM
TRICUSPID VALVE PEAK REGURGITATION VELOCITY: 3.2 M/S

## 2024-07-23 PROCEDURE — 99213 OFFICE O/P EST LOW 20 MIN: CPT | Performed by: INTERNAL MEDICINE

## 2024-07-23 PROCEDURE — 93306 TTE W/DOPPLER COMPLETE: CPT

## 2024-07-23 PROCEDURE — 93306 TTE W/DOPPLER COMPLETE: CPT | Performed by: INTERNAL MEDICINE

## 2024-07-23 RX ORDER — DILTIAZEM HYDROCHLORIDE 180 MG/1
180 CAPSULE, COATED, EXTENDED RELEASE ORAL DAILY
Qty: 30 CAPSULE | Refills: 10 | Status: SHIPPED | OUTPATIENT
Start: 2024-07-23

## 2024-07-23 NOTE — PROGRESS NOTES
"                                           Cardiology Follow Up    Glendy Pete  1945  [id number]  [facility] [place]  [address] [address] [phone]    1. Paroxysmal atrial fibrillation (HCC)  -     diltiazem (CARDIZEM CD) 180 mg 24 hr capsule; Take 1 capsule (180 mg total) by mouth daily  2. Asymptomatic carotid artery stenosis without infarction, left  -     Ambulatory referral to Cardiology  3. Moderate aortic stenosis  4. Pacemaker        Interval History: 78-year-old single retired [employer] who moved up from [place].  She saw Dr. JEAN.    She has known aortic stenosis.  She also has had paroxysmal atrial fibrillation confirmed on pacemaker interrogation.  She had pacemaker installed about 3 years ago for recurrent syncope.  She has not had it since.    She is not particularly active but walks about a quarter of a mile 3 times a week.  She said she might stop once along the way because she feels \"tired.\"  She denies shortness of breath and also denies exertional chest discomfort.    Episodes of atrial fibrillation have been noted because of rapid ventricular response but she herself does not feel episodes of rapid heartbeat.  She takes a low-dose of Toprol.    Patient Active Problem List   Diagnosis    History of melanoma    Hypothyroid    Primary hypertension    Mild intermittent asthma without complication    Pacemaker    Hearing impairment    History of colon cancer    Carotid stenosis, asymptomatic, left    Stage 3a chronic kidney disease (HCC)    Moderate aortic stenosis    Paroxysmal atrial fibrillation (HCC)     Past Medical History:   Diagnosis Date    Asthma     Colon cancer (HCC)      Social History     Socioeconomic History    Marital status: Single     Spouse name: Not on file    Number of children: Not on file    Years of education: Not on file    Highest education level: Not on file   Occupational History    " Not on file   Tobacco Use    Smoking status: Never    Smokeless tobacco: Never   Vaping Use    Vaping status: Never Used   Substance and Sexual Activity    Alcohol use: Not Currently    Drug use: Never    Sexual activity: Not Currently   Other Topics Concern    Not on file   Social History Narrative    Divorsed        1 child    Hobbies-art    Reg dental care, brushes twice daily     Social Determinants of Health     Financial Resource Strain: Low Risk  (5/26/2023)    Overall Financial Resource Strain (CARDIA)     Difficulty of Paying Living Expenses: Not hard at all   Food Insecurity: No Food Insecurity (6/6/2024)    Hunger Vital Sign     Worried About Running Out of Food in the Last Year: Never true     Ran Out of Food in the Last Year: Never true   Transportation Needs: No Transportation Needs (6/6/2024)    PRAPARE - Transportation     Lack of Transportation (Medical): No     Lack of Transportation (Non-Medical): No   Physical Activity: Not on file   Stress: Not on file   Social Connections: Not on file   Intimate Partner Violence: Not on file   Housing Stability: Low Risk  (6/6/2024)    Housing Stability Vital Sign     Unable to Pay for Housing in the Last Year: No     Number of Times Moved in the Last Year: 0     Homeless in the Last Year: No      Family History   Problem Relation Age of Onset    Heart disease Mother     COPD Sister     Emphysema Sister     Colon cancer Brother     Liver cancer Paternal Grandfather      Past Surgical History:   Procedure Laterality Date    CARDIAC PACEMAKER PLACEMENT  2020    COLON SURGERY      CRANIOTOMY Right 1989    R bleed    SKIN CANCER EXCISION Right     Melanoma R arm       Current Outpatient Medications:     albuterol (PROVENTIL HFA,VENTOLIN HFA) 90 mcg/act inhaler, Inhale 2 puffs every 6 (six) hours as needed for wheezing, Disp: 18 g, Rfl: 5    apixaban (ELIQUIS) 5 mg, Take 1 tablet (5 mg total) by mouth 2 (two) times a day, Disp: 60 tablet, Rfl: 5     atorvastatin (LIPITOR) 20 mg tablet, Take 1 tablet (20 mg total) by mouth daily, Disp: 90 tablet, Rfl: 0    diltiazem (CARDIZEM CD) 180 mg 24 hr capsule, Take 1 capsule (180 mg total) by mouth daily, Disp: 30 capsule, Rfl: 10    Fluticasone Furoate-Vilanterol 100-25 mcg/actuation inhaler, Inhale 1 puff daily Rinse mouth after use., Disp: , Rfl:     losartan (COZAAR) 100 MG tablet, Take 1 tablet (100 mg total) by mouth daily, Disp: 90 tablet, Rfl: 1    metoprolol succinate (TOPROL-XL) 25 mg 24 hr tablet, Take 1 tablet (25 mg total) by mouth daily, Disp: 90 tablet, Rfl: 3    montelukast (SINGULAIR) 10 mg tablet, Take 10 mg by mouth, Disp: , Rfl:     ondansetron (Zofran ODT) 4 mg disintegrating tablet, Take 1 tablet (4 mg total) by mouth every 6 (six) hours as needed for nausea or vomiting, Disp: 20 tablet, Rfl: 0    thyroid (ARMOUR) 15 MG tablet, Take by mouth, Disp: , Rfl:     triamcinolone (KENALOG) 0.1 % cream, APPLY TO AFFECTED AREA TWICE A DAY, Disp: 30 g, Rfl: 0    losartan (COZAAR) 50 mg tablet, Take 50 mg by mouth daily (Patient not taking: Reported on 7/1/2024), Disp: , Rfl:     temazepam (RESTORIL) 15 mg capsule, Take 15 mg by mouth daily at bedtime as needed for sleep (Patient not taking: Reported on 7/23/2024), Disp: , Rfl:     Current Facility-Administered Medications:     lidocaine (XYLOCAINE) 4 % topical solution 5 mL, 5 mL, Topical, Once, Geno Baez PA-C  Allergies   Allergen Reactions    Codeine Nausea Only       Labs:  Hospital Outpatient Visit on 07/23/2024   Component Date Value    Triscuspid Valve Regurgi* 07/23/2024 41.0     RAA A4C 07/23/2024 9.4     ALEXA A4C 07/23/2024 19.5     LA Volume Index (BP) 07/23/2024 34.4     MV Peak A Geovanny 07/23/2024 1.35     MV stenosis pressure 1/2* 07/23/2024 115     MV VTI 07/23/2024 51.36     MV Peak E Geovanny 07/23/2024 141     MV peak gradient antegra* 07/23/2024 7     AV peak gradient 07/23/2024 60     LVOT stroke volume 07/23/2024 62.00     Ao VTI 07/23/2024  104.59     Aortic valve peak veloci* 07/23/2024 3.86     LVOT peak VTI 07/23/2024 24.32     LVOT peak geovanny 07/23/2024 0.85     LVOT diameter 07/23/2024 1.8     E wave deceleration time 07/23/2024 398     E/A ratio 07/23/2024 1.04     MV valve area p 1/2 meth* 07/23/2024 1.90     MV mean gradient antegra* 07/23/2024 3     AV LVOT peak gradient 07/23/2024 3     AV mean gradient 07/23/2024 39     TR Peak Geovanny 07/23/2024 3.2     AV area peak geovanny 07/23/2024 0.6     AV area by cont VTI 07/23/2024 0.6     LVOT mn grad 07/23/2024 2.0     RVID d 07/23/2024 2.8     A4C EF 07/23/2024 49     Aortic valve mean veloci* 07/23/2024 30.10     Tricuspid valve peak reg* 07/23/2024 3.20     Left ventricular stroke * 07/23/2024 36.00     IVSd 07/23/2024 1.30     Tricuspid annular plane * 07/23/2024 1.80     Ao root 07/23/2024 3.00     LVPWd 07/23/2024 1.20     LA size 07/23/2024 3.8     LA volume (BP) 07/23/2024 54     FS 07/23/2024 29     LVIDS 07/23/2024 2.70     IVS 07/23/2024 1.3     LVIDd 07/23/2024 3.80     LA length (A2C) 07/23/2024 5.30     LEFT VENTRICLE SYSTOLIC * 07/23/2024 26     LV DIASTOLIC VOLUME (MOD* 07/23/2024 62     LVOT Cardiac Index 07/23/2024 2.36     LVOT stroke volume index 07/23/2024 39.50     LVOT Cardiac Output 07/23/2024 3.71     Left Atrium Area-systoli* 07/23/2024 18.4     Left Atrium Area-systoli* 07/23/2024 19.8     MV E' Tissue Velocity Se* 07/23/2024 4     LVSV, 2D 07/23/2024 36     BSA 07/23/2024 1.57     LVOT area 07/23/2024 2.54     DVI 07/23/2024 0.22     AV valve area 07/23/2024 0.59     MV valve area by continu* 07/23/2024 1.20    Appointment on 07/11/2024   Component Date Value    Sodium 07/11/2024 141     Potassium 07/11/2024 4.3     Chloride 07/11/2024 107     CO2 07/11/2024 28     ANION GAP 07/11/2024 6     BUN 07/11/2024 13     Creatinine 07/11/2024 0.90     Glucose 07/11/2024 91     Calcium 07/11/2024 9.5     AST 07/11/2024 18     ALT 07/11/2024 11     Alkaline Phosphatase 07/11/2024 58      Total Protein 07/11/2024 6.7     Albumin 07/11/2024 4.1     Total Bilirubin 07/11/2024 0.45     eGFR 07/11/2024 61     Cholesterol 07/11/2024 155     Triglycerides 07/11/2024 63     HDL, Direct 07/11/2024 66     LDL Calculated 07/11/2024 76     Non-HDL-Chol (CHOL-HDL) 07/11/2024 89     Free T4 07/11/2024 0.66     TSH 3RD GENERATON 07/11/2024 2.781      Imaging: CTA head and neck w wo contrast    Result Date: 7/21/2024  Narrative: CTA NECK AND BRAIN WITH AND WITHOUT CONTRAST INDICATION: I65.22: Occlusion and stenosis of left carotid artery COMPARISON:   MRI brain 2/20/2024. TECHNIQUE:  Routine CT imaging of the Brain without contrast.Post contrast imaging was performed after administration of iodinated contrast through the neck and brain. Post contrast axial 0.625 mm images timed to opacify the arterial system.  3D rendering was performed on an independent workstation.   MIP reconstructions performed. Coronal and sagittal reconstructions were performed of the non contrast portion of the brain. Radiation dose length product (DLP) for this visit:  1282 mGy-cm .  This examination, like all CT scans performed in the [facility], was performed utilizing techniques to minimize radiation dose exposure, including the use of iterative reconstruction and automated exposure control. IV Contrast:  85 mL of iohexol (OMNIPAQUE) IMAGE QUALITY:   Diagnostic FINDINGS: NONCONTRAST BRAIN PARENCHYMA: Postsurgical sequelae related to prior right frontal craniotomy. Encephalomalacia/gliosis in the inferior frontal lobes, right greater than left, is again seen. Patchy hypoattenuation in the gangliocapsular regions, when correlated with brain  MRI, represents prominent perivascular spaces. No acute intracranial hemorrhage, new mass effect or midline shift. Mild chronic ischemic changes of the white matter. Intracranial vascular calcifications. VENTRICLES AND EXTRA-AXIAL SPACES:Normal for the patient's age. VISUALIZED ORBITS:  Chronic appearing bilateral medial orbital wall fractures. PARANASAL SINUSES: Mild mucosal thickening. CTA NECK ARCH AND GREAT VESSELS: Atherosclerosis. VERTEBRAL ARTERIES: Patent extracranial segments. RIGHT CAROTID: Approximately 40 to 45% stenosis of the right ICA at the carotid bifurcation, due to calcified and noncalcified plaque. LEFT CAROTID: Approximately 75 to 80% stenosis of the left ICA at the carotid bifurcation, due to calcified and noncalcified plaque. NASCET criteria was used to determine the degree of internal carotid artery diameter stenosis. CTA BRAIN: INTERNAL CAROTID ARTERIES: Atherosclerosis with up to moderate multifocal stenosis of the internal carotid arteries particularly in the cavernous portions. 2 mm inferiorly directed right ICA terminus outpouching (series 4, image 90). ANTERIOR CEREBRAL ARTERY CIRCULATION:  No significant stenosis or occlusion. MIDDLE CEREBRAL ARTERY CIRCULATION:  No significant stenosis or occlusion. DISTAL VERTEBRAL ARTERIES:  No significant stenosis or occlusion. BASILAR ARTERY:  No significant stenosis or occlusion. POSTERIOR CEREBRAL ARTERIES: No significant stenosis or occlusion. VENOUS STRUCTURES: No acute abnormality, noting this examination is not tailored for assessment. NON VASCULAR ANATOMY BONY STRUCTURES:  No acute fracture. SOFT TISSUES OF THE NECK: Indeterminate 1 cm hypoattenuating structure in the left supraclavicular fossa (series 4, image 206). THORACIC INLET: Partially visualized, partially calcified right breast implant.     Impression: 1. No acute intracranial hemorrhage or mass effect. 2. Approximately 75 to 80% stenosis of the left ICA at the carotid bifurcation, due to calcified and noncalcified plaque. Approximately 40 to 45% stenosis of the right ICA at the carotid bifurcation, due to calcified and noncalcified plaque. 3. No proximal large vessel occlusion in the head and neck. 4. There is a 2 mm inferiorly directed outpouching of the right  "ICA terminus, which may represent a small aneurysm. 5. Indeterminate 1 cm hypoattenuating structure in the left supraclavicular fossa, which could represent a varix of the unopacified left brachiocephalic vein versus a lymph node. This could be further evaluated with neck CT (accounting for venous phase of contrast), as clinically appropriate. The study was marked in EPIC for significant notification. Workstation performed: [id number]       Review of Systems:  Review of Systems    Physical Exam:  172/94.  Heart rate 61 and regular.  Carotids are diminished but not delayed.  Lungs clear.  Rhythm regular.  Grade 3/6 systolic ejection murmur of medium length.  A2 preserved.  Regular rhythm.  No organomegaly.  No edema.        Discussion/Summary:    1.  Aortic stenosis not symptomatic at the present time  2.  Paroxysmal atrial fibrillation  3.  Pacemaker in place for syncope    Recommendations:    1.  Add Cardizem  to attempt to control heart rate better  2.  Patient advised to continue to take her walks and let us know if she has exertional discomfort causing her to rest  3.  Follow-up 6 weeks to see how Cardizem is tolerated and whether or not is it is effective  4.  Patient is \"cleared\" for carotid endarterectomy.  Her risk would be considered higher than average because of underlying aortic stenosis and presumed coronary artery disease      Paras Arreguin MD  "

## 2024-07-24 DIAGNOSIS — I65.22 CAROTID STENOSIS, ASYMPTOMATIC, LEFT: ICD-10-CM

## 2024-07-24 DIAGNOSIS — I65.22 ATHEROSCLEROSIS OF LEFT CAROTID ARTERY: ICD-10-CM

## 2024-07-24 DIAGNOSIS — I65.22 ASYMPTOMATIC CAROTID ARTERY STENOSIS WITHOUT INFARCTION, LEFT: Primary | ICD-10-CM

## 2024-07-24 NOTE — TELEPHONE ENCOUNTER
"CARDIO OV 7/23/24  \"4. Patient is \"cleared\" for carotid endarterectomy. Her risk would be considered higher than average because of underlying aortic stenosis and presumed coronary artery disease \"  "

## 2024-07-26 ENCOUNTER — PREP FOR PROCEDURE (OUTPATIENT)
Dept: VASCULAR SURGERY | Facility: CLINIC | Age: 79
End: 2024-07-26

## 2024-07-26 NOTE — TELEPHONE ENCOUNTER
Verified patient's insurance   CONFIRMED - Patient's insurance is [employer]   Is patient requesting a call when authorization has been obtained? Patient did not request a call.    Surgery Date: 8/7/24  Primary Surgeon: CAP // Byron Almanza (NPI: [id number])  Assisting Surgeon: Not Applicable (N/A)  Facility: [facility] (Tax: [id number] / NPI: [id number])  Inpatient / Outpatient: Inpatient  Level: 3    Clearance Received: No clearance ordered.  Consent Received: Yes, scanned into Epic on 7/15/24.  Medication Hold / Last Dose:  Hold Eliquis 2 days prior, last dose 8/4/24 no hold on ASA  IR Notified: Not Applicable (N/A)  Rep. Notified:  Vas tech notified 7/26/24  Equipment Needs: Not Applicable (N/A)  Vas Lab Requested: Not Applicable (N/A)  Patient Contacted: 7/26/24    Diagnosis: I65.22  Procedure/ CPT Code(s): (CEA) Carotid Endarterectomy / Patch Angioplasty // CPT: 95786    For varicose vein related procedures:   Last LEVDR: Not Applicable (N/A)  CEAP Classification: Not Applicable (N/A)  VCSS: Not Applicable (N/A)    Post Operative Date/ Time: 8/14/24 , 830[address] with Sandhya Serra (NPI: [id number])     *Please review medication hold(s), PATs, and check H&P with patient.*  PATIENT WAS MAILED SURGERY/SHOWERING/DISCHARGE/COVID INSTRUCTIONS AFTER REVIEWING WITH THEM VIA PHONE CALL.

## 2024-07-31 ENCOUNTER — APPOINTMENT (OUTPATIENT)
Dept: LAB | Facility: HOSPITAL | Age: 79
End: 2024-07-31
Payer: COMMERCIAL

## 2024-07-31 DIAGNOSIS — I65.22 CAROTID STENOSIS, ASYMPTOMATIC, LEFT: ICD-10-CM

## 2024-07-31 DIAGNOSIS — I65.22 ASYMPTOMATIC CAROTID ARTERY STENOSIS WITHOUT INFARCTION, LEFT: ICD-10-CM

## 2024-07-31 DIAGNOSIS — I65.22 ATHEROSCLEROSIS OF LEFT CAROTID ARTERY: ICD-10-CM

## 2024-07-31 LAB
ANION GAP SERPL CALCULATED.3IONS-SCNC: 6 MMOL/L (ref 4–13)
BUN SERPL-MCNC: 18 MG/DL (ref 5–25)
CALCIUM SERPL-MCNC: 9.8 MG/DL (ref 8.4–10.2)
CHLORIDE SERPL-SCNC: 109 MMOL/L (ref 96–108)
CO2 SERPL-SCNC: 27 MMOL/L (ref 21–32)
CREAT SERPL-MCNC: 0.89 MG/DL (ref 0.6–1.3)
ERYTHROCYTE [DISTWIDTH] IN BLOOD BY AUTOMATED COUNT: 13 % (ref 11.6–15.1)
GFR SERPL CREATININE-BSD FRML MDRD: 62 ML/MIN/1.73SQ M
GLUCOSE P FAST SERPL-MCNC: 100 MG/DL (ref 65–99)
HCT VFR BLD AUTO: 40.6 % (ref 34.8–46.1)
HGB BLD-MCNC: 13 G/DL (ref 11.5–15.4)
MCH RBC QN AUTO: 30 PG (ref 26.8–34.3)
MCHC RBC AUTO-ENTMCNC: 32 G/DL (ref 31.4–37.4)
MCV RBC AUTO: 94 FL (ref 82–98)
PLATELET # BLD AUTO: 216 THOUSANDS/UL (ref 149–390)
PMV BLD AUTO: 8.9 FL (ref 8.9–12.7)
POTASSIUM SERPL-SCNC: 4.7 MMOL/L (ref 3.5–5.3)
RBC # BLD AUTO: 4.34 MILLION/UL (ref 3.81–5.12)
SODIUM SERPL-SCNC: 142 MMOL/L (ref 135–147)
WBC # BLD AUTO: 7.53 THOUSAND/UL (ref 4.31–10.16)

## 2024-07-31 PROCEDURE — 36415 COLL VENOUS BLD VENIPUNCTURE: CPT

## 2024-07-31 PROCEDURE — 85027 COMPLETE CBC AUTOMATED: CPT

## 2024-07-31 PROCEDURE — 80048 BASIC METABOLIC PNL TOTAL CA: CPT

## 2024-08-02 DIAGNOSIS — Z01.818 PREOP TESTING: Primary | ICD-10-CM

## 2024-08-07 ENCOUNTER — TELEPHONE (OUTPATIENT)
Dept: CARDIAC SURGERY | Facility: CLINIC | Age: 79
End: 2024-08-07

## 2024-08-07 DIAGNOSIS — I35.0 SEVERE AORTIC STENOSIS: Primary | ICD-10-CM

## 2024-08-07 NOTE — TELEPHONE ENCOUNTER
RENEA for patient to contact the office to schedule a TAVR consult w/ Dr. Del Cid.    OFFICE WILL SCHEDULE THIS APPOINTMENT.  PLEASE TRANSFER CALL TO OFFICE.

## 2024-08-08 ENCOUNTER — TELEPHONE (OUTPATIENT)
Dept: CARDIOLOGY CLINIC | Facility: CLINIC | Age: 79
End: 2024-08-08

## 2024-08-08 ENCOUNTER — PREP FOR PROCEDURE (OUTPATIENT)
Dept: CARDIOLOGY CLINIC | Facility: CLINIC | Age: 79
End: 2024-08-08

## 2024-08-08 ENCOUNTER — OFFICE VISIT (OUTPATIENT)
Dept: CARDIAC SURGERY | Facility: CLINIC | Age: 79
End: 2024-08-08
Payer: COMMERCIAL

## 2024-08-08 VITALS
BODY MASS INDEX: 23.55 KG/M2 | WEIGHT: 128 LBS | SYSTOLIC BLOOD PRESSURE: 148 MMHG | HEIGHT: 62 IN | DIASTOLIC BLOOD PRESSURE: 65 MMHG | OXYGEN SATURATION: 95 % | HEART RATE: 60 BPM

## 2024-08-08 DIAGNOSIS — Z01.818 PREOP EXAMINATION: ICD-10-CM

## 2024-08-08 DIAGNOSIS — I65.22 CAROTID STENOSIS, ASYMPTOMATIC, LEFT: ICD-10-CM

## 2024-08-08 DIAGNOSIS — I35.0 SEVERE AORTIC STENOSIS: ICD-10-CM

## 2024-08-08 DIAGNOSIS — I35.0 SEVERE AORTIC STENOSIS: Primary | ICD-10-CM

## 2024-08-08 DIAGNOSIS — Z01.810 PREOP CARDIOVASCULAR EXAM: ICD-10-CM

## 2024-08-08 DIAGNOSIS — K08.9 POOR DENTITION: Primary | ICD-10-CM

## 2024-08-08 PROCEDURE — 99205 OFFICE O/P NEW HI 60 MIN: CPT | Performed by: THORACIC SURGERY (CARDIOTHORACIC VASCULAR SURGERY)

## 2024-08-08 NOTE — TELEPHONE ENCOUNTER
"Patient scheduled for Right + Left Heart Cath on 8/23/24 at [facility] with Dr. Handley.      Instructions sent to patient through [employer].      Also emailed to patient at [email or web address]    Patient aware of all general instructions.    Medication holds:   N/A    Blood Thinners:   Eliquis - Do not take the night before and morning of procedure.     Labs to be done on:  N/A  (Patient did BMP / CBC on 7/31/24          Thank you,  Dalia \"Violet\" Prince      "

## 2024-08-08 NOTE — H&P (VIEW-ONLY)
"Ing.  Consultation - Cardiothoracic Surgery   Glendy Pete 78 y.o. female MRN: [id number]    Physician Requesting Consult: Dr. Arreguin    Reason for Consult / Principal Problem: Aortic stenosis, Non-Rheumatic    History of Present Illness: Glendy Pete is a 78 y.o. year old female who presents for evaluation of severe AS. Relocated to the area from [place] in 2023. Prior history of aortic stenosis which dates back to at least 5 years ago & was being monitored by cardiology in [place]. States she was having presyncopal episodes & was admitted to the hospital which is how the AS was found. Since that time she has followed w/ a cardiologist there & had progressive syncopal episodes leading to a dx of tachy-dev syndrome/PAF & she is now s/p SJM PPM & on AC as well as prior Flecainide therapy & not Cardizem therapy. She has since relocated to [place] ~1-1.5 years ago & has established w/ cardiology & is receiving routine TTEs. On a PCP exam a carotid bruit was ausculted leading to a US showing LICA stenosis. Since then she has established w/ a vascular sx, had a CTA head/neck confirming LICA stenosis, went to cardiology for \"clearance\" from cardiology after an updated TTE showed progression of aortic valvular disease to severe likely due to being asymptomatic w/ the advice to be seen by the cardiac surgeon after carotid intervention. Upon arrival to the hospital for her L carotid angioplasty, the anesthesiologist advised postponing surgery until she had her valve addressed therefore she presents today.    Upon examination today, Ms. Pete reports she feeling well. Denies CP, palpitations, SOB, HERNANDEZ, LE edema b/l, orthopnea, PND, numbness/tinlging/paresthesias in UE or LE bilaterally, HA, lightheadeness, dizziness, presyncopal symptoms, hx syncopal events, N/V/D, hemoptysis, hematemesis, hematochezia, melena. Denies hx stroke, hx cancer w/ chest wall radiation, hx blood loss anemia, hx varicose veins or vein stripping.    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 PSH includes SJM PPM, right craniotomy, colon resection, melanoma excision. FH significant for maternal HD (unsure type). Denies FH of CAD/MI, HTN, HL, valvular disease, DM, aortic aneurysms, or SCD. Patient is retired & worked as cosmatologist. Lives in a apartment in the home of her brother. Does not use an assist device for ambulation. Drives. Independent ADLs. Denies current or prior use of tobacco, ETOH, or drug use. Allergies include Codeine with rxn N. Cardiac pertinent medications include: Eliquis, Cardizem 180mg, Losartan 100mg, Toprol 25mg. Other medications can be reviewed in the patient chart.    Patient sees Luc Mathew PA-C as her primary care provider, their prior documentation were reviewed at this visit. Patient sees Dr. Almanza as her vascular sx, their prior visit documentation was reviewed at this visit. Patient sees Dr. Arreguin as her cardiologist, their proior visit documentation was reviewed at this visit. Patient does see a dentist regularly with last appointment >1 year ago ([facility] while in [place]). (+) dentures.     Past Medical History:  Past Medical History:   Diagnosis Date    Asthma     Carotid artery stenosis     left asymptomatic 75-80%, right asymptomatic 40-45%    Carotid stenosis, asymptomatic, left     70%    CKD (chronic kidney disease), stage III (HCC)     baseline Cr 0.8-1.0    History of colon cancer     s/p resection & chemotherapy    History of melanoma excision     History of nonadherence to medical treatment     History of syncope     s/p SJM PPM    [place] (hard of hearing)     no aids    Hypertension     Hypothyroidism     PAF (paroxysmal atrial  fibrillation) (HCC)     prior Flecanide, Cardizem, Eliquis    Severe aortic stenosis      Past Surgical History:   Past Surgical History:   Procedure Laterality Date    CARDIAC PACEMAKER PLACEMENT  2020    [facility]    COLON SURGERY  1992    colon resection with chemo    CRANIOTOMY Right 1989    R bleed-from fall and hit head    SKIN CANCER EXCISION Right     Melanoma R arm     Family History:  Family History   Problem Relation Age of Onset    Heart disease Mother     COPD Sister     Emphysema Sister     Colon cancer Brother     Liver cancer Paternal Grandfather      Social History:  Social History     Substance and Sexual Activity   Alcohol Use Not Currently     Social History     Substance and Sexual Activity   Drug Use Never     Social History     Tobacco Use   Smoking Status Never   Smokeless Tobacco Never       Home Medications:   Prior to Admission medications    Medication Sig Start Date End Date Taking? Authorizing Provider   albuterol (PROVENTIL HFA,VENTOLIN HFA) 90 mcg/act inhaler Inhale 2 puffs every 6 (six) hours as needed for wheezing 2/29/24   Darrel Mathew PA-C   apixaban (ELIQUIS) 5 mg Take 1 tablet (5 mg total) by mouth 2 (two) times a day 5/4/24   Darrel Mathew PA-C   atorvastatin (LIPITOR) 20 mg tablet Take 1 tablet (20 mg total) by mouth daily 2/19/24   FLORENCIO Richardson   diltiazem (CARDIZEM CD) 180 mg 24 hr capsule Take 1 capsule (180 mg total) by mouth daily 7/23/24   Paras Arreguin MD   Fluticasone Furoate-Vilanterol 100-25 mcg/actuation inhaler Inhale 1 puff daily Rinse mouth after use.    Historical Provider, MD   losartan (COZAAR) 100 MG tablet Take 1 tablet (100 mg total) by mouth daily 2/29/24   Darrel Mathew PA-C   losartan (COZAAR) 50 mg tablet Take 50 mg by mouth daily  Patient not taking: Reported on 7/1/2024 5/3/24   Historical Provider, MD   metoprolol succinate (TOPROL-XL) 25 mg 24 hr tablet Take 1 tablet (25 mg total) by mouth daily 1/4/24   Kamar Schaffer,  "MD   montelukast (SINGULAIR) 10 mg tablet Take 10 mg by mouth    Historical Provider, MD   ondansetron (Zofran ODT) 4 mg disintegrating tablet Take 1 tablet (4 mg total) by mouth every 6 (six) hours as needed for nausea or vomiting 1/14/24   Luis Carter PA-C   temazepam (RESTORIL) 15 mg capsule Take 15 mg by mouth daily at bedtime as needed for sleep    Historical Provider, MD   thyroid (ARMOUR) 15 MG tablet Take 15 mg by mouth daily    Historical Provider, MD   triamcinolone (KENALOG) 0.1 % cream APPLY TO AFFECTED AREA TWICE A DAY  Patient taking differently: Apply 1 Application topically 2 (two) times a day as needed To affected area 6/29/24   Darrel Mathew PA-C       Allergies:  Allergies   Allergen Reactions    Codeine Nausea Only       Review of Systems:     Review of Systems   Constitutional: Negative.  Negative for activity change, diaphoresis, fatigue and unexpected weight change.   HENT:  Positive for dental problem.    Respiratory: Negative.  Negative for chest tightness, shortness of breath and wheezing.    Cardiovascular: Negative.  Negative for chest pain, palpitations and leg swelling.   Gastrointestinal: Negative.  Negative for blood in stool, constipation, diarrhea, nausea and vomiting.   Genitourinary: Negative.    Musculoskeletal: Negative.  Negative for arthralgias, back pain, gait problem and myalgias.   Skin: Negative.    Neurological: Negative.  Negative for dizziness, syncope, weakness, light-headedness, numbness and headaches.   Hematological: Negative.  Does not bruise/bleed easily.   All other systems reviewed and are negative.      Vital Signs:     Vitals:    08/08/24 1341 08/08/24 1351   BP: 163/67 148/65   BP Location: Left arm Right arm   Patient Position: Sitting Sitting   Cuff Size: Standard Standard   Pulse: 60    SpO2: 95%    Weight: 58.1 kg (128 lb)    Height: 5' 2\" (1.575 m)        Physical Exam:     Physical Exam  Constitutional:       General: She is not in acute " "distress.     Appearance: Normal appearance. She is well-developed. She is not ill-appearing or toxic-appearing.      Comments: Sitting on exam table in NAD   HENT:      Head: Normocephalic and atraumatic.   Neck:      Vascular: No carotid bruit or JVD.      Trachea: Trachea normal.   Cardiovascular:      Rate and Rhythm: Normal rate and regular rhythm.      Chest Wall: PMI is not displaced.      Pulses:           Carotid pulses are  on the right side with bruit.     Heart sounds: S1 normal and S2 normal. Murmur heard.      Systolic murmur is present with a grade of 5/6.      No friction rub. No gallop. No S3 or S4 sounds.      Comments: No heaves/lifts on palpation  Pulmonary:      Effort: Pulmonary effort is normal. No accessory muscle usage or respiratory distress.      Breath sounds: Normal breath sounds. No wheezing, rhonchi or rales.   Abdominal:      General: Bowel sounds are normal. There is no distension.      Palpations: Abdomen is not rigid. There is no mass.      Tenderness: There is no abdominal tenderness. There is no guarding or rebound.   Musculoskeletal:      Cervical back: Normal range of motion and neck supple.   Skin:     General: Skin is warm, dry and intact.      Comments: Trace b/l LE; no VV to b/l LE   Neurological:      Mental Status: She is alert and oriented to person, place, and time.      Cranial Nerves: No cranial nerve deficit.      Sensory: No sensory deficit.      Comments: No focal deficits   Psychiatric:         Mood and Affect: Mood and affect normal.       Lab Results:               Invalid input(s): \"LABGLOM\"      No results found for: \"HGBA1C\"  No results found for: \"CKTOTAL\", \"CKMB\", \"CKMBINDEX\", \"TROPONINI\"    Imaging Studies:     Echocardiogram: EF 60%, severe AS (mean 47, peak 67, RUPA 0.6cm2), mild MS, mild TR    I have personally reviewed pertinent reports.   and I have personally reviewed pertinent films in PACS    TAVR evaluation Assessment:     [facility]: II    STS Risk " Score: 3.9 %, mortality risk    Aortic Stenosis Stage: C1    5 Meter Walk Test:      Attempt 1: 6   Attempt 2: 6   Attempt 3: 7    KCCQ-12 completed    Assessment:  Patient Active Problem List    Diagnosis Date Noted    Stage 3a chronic kidney disease (HCC) 02/29/2024    Severe aortic stenosis 02/29/2024    Paroxysmal atrial fibrillation (HCC) 02/29/2024    Carotid stenosis, asymptomatic, left 02/19/2024    Hypothyroid 04/07/2023    Primary hypertension 04/07/2023    Mild intermittent asthma without complication 04/07/2023    Pacemaker 04/07/2023    Hearing impairment 04/07/2023    History of colon cancer 04/07/2023    History of melanoma 01/14/2013     Severe aortic stenosis; Ongoing TAVR workup    Plan:    Glendy Pete has severe symptomatic aortic stenosis. Based on their STS risk assessment, they will undergo the following testing for transcatheter aortic valve replacement: gated CTA of the chest, abdomen, and pelvis, cardiac catheterization, and dental clearance.    Once these studies have been completed, Glendy Pete will follow up in our office to review the results and confirm the suitability of proceeding with transcatheter aortic valve replacment.    Shared decision-making encounter occurred during this visit. Additionally, heart team evaluation of suitability for surgical replacement has been completed.      Glendy Pete was comfortable with our recommendations, and their questions were answered to their satisfaction.  We will continue to evaluate the patient, with a final surgical recommendation pending the above work up.  Thank you for allowing us to participate in the care of this patient.     Routine referral to gastroenterology for colonoscopy screening was not indicated, as the patient is over 75 years old    SIGNATURE: Pura Gamboa PA-C  DATE: August 8, 2024  TIME: 2:30 PM    * This note was completed in part utilizing [employer] fluency direct voice recognition software.   Grammatical errors, random  word insertion, spelling mistakes, and incomplete sentences may be an occasional consequence of the system secondary to software limitations, ambient noise and hardware issues. At the time of dictation, efforts were made to edit, clarify and /or correct errors. Please read the chart carefully and recognize, using context, where substitutions have occurred.  If you have any questions or concerns about the context, text or information contained within the body of this dictation, please contact myself, the provider, for further clarification.

## 2024-08-08 NOTE — LETTER
"August 8, 2024     Darrel Mathew PA-C  [address]  [facility] [address]    Patient: Glendy Pete   YOB: 1945   Date of Visit: 8/8/2024       Dear Dr. Mathew:    Thank you for referring Glendy Pete to me for evaluation. Below are my notes for this consultation.    If you have questions, please do not hesitate to call me. I look forward to following your patient along with you.         Sincerely,        Herbert Del Cdi MD        CC: MD Paras Schafer MD Ralph Hawks, MD Ellyn Dornseif, PA-C  8/8/2024  2:44 PM  Attested  Ing.  Consultation - Cardiothoracic Surgery   Glendy Pete 78 y.o. female MRN: [id number]    Physician Requesting Consult: Dr. Arreguin    Reason for Consult / Principal Problem: Aortic stenosis, Non-Rheumatic    History of Present Illness: Glendy Pete is a 78 y.o. year old female who presents for evaluation of severe AS. Relocated to the area from [place] in 2023. Prior history of aortic stenosis which dates back to at least 5 years ago & was being monitored by cardiology in [place]. States she was having presyncopal episodes & was admitted to the hospital which is how the AS was found. Since that time she has followed w/ a cardiologist there & had progressive syncopal episodes leading to a dx of tachy-dev syndrome/PAF & she is now s/p SJM PPM & on AC as well as prior Flecainide therapy & not Cardizem therapy. She has since relocated to [place] ~1-1.5 years ago & has established w/ cardiology & is receiving routine TTEs. On a PCP exam a carotid bruit was ausculted leading to a US showing LICA stenosis. Since then she has established w/ a vascular sx, had a CTA head/neck confirming LICA stenosis, went to cardiology for \"clearance\" from cardiology after an updated TTE showed progression of aortic valvular disease to severe likely due to being asymptomatic w/ the advice to be seen by the cardiac surgeon after carotid intervention. Upon arrival to the " hospital for her L carotid angioplasty, the anesthesiologist advised postponing surgery until she had her valve addressed therefore she presents today.    Upon examination today, Ms. Pete reports she feeling well. Denies CP, palpitations, SOB, HERNANDEZ, LE edema b/l, orthopnea, PND, numbness/tinlging/paresthesias in UE or LE bilaterally, HA, lightheadeness, dizziness, presyncopal symptoms, hx syncopal events, N/V/D, hemoptysis, hematemesis, hematochezia, melena. Denies hx stroke, hx cancer w/ chest wall radiation, hx blood loss anemia, hx varicose veins or vein stripping.    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 PSH includes SJM PPM, right craniotomy, colon resection, melanoma excision. FH significant for maternal HD (unsure type). Denies FH of CAD/MI, HTN, HL, valvular disease, DM, aortic aneurysms, or SCD. Patient is retired & worked as cosmatologist. Lives in a apartment in the home of her brother. Does not use an assist device for ambulation. Drives. Independent ADLs. Denies current or prior use of tobacco, ETOH, or drug use. Allergies include Codeine with rxn N. Cardiac pertinent medications include: Eliquis, Cardizem 180mg, Losartan 100mg, Toprol 25mg. Other medications can be reviewed in the patient chart.    Patient sees Luc Mathew PA-C as her primary care provider, their prior documentation were reviewed at this visit. Patient sees Dr. Almanza as her vascular sx, their prior visit documentation was reviewed at this visit. Patient sees Dr. Arreguin as her cardiologist, their proior visit documentation was reviewed at this visit. Patient does see a dentist regularly with last appointment >1 year ago (LV while in [place]). (+)  dentures.     Past Medical History:  Past Medical History:   Diagnosis Date   • Asthma    • Carotid artery stenosis     left asymptomatic 75-80%, right asymptomatic 40-45%   • Carotid stenosis, asymptomatic, left     70%   • CKD (chronic kidney disease), stage III (HCC)     baseline Cr 0.8-1.0   • History of colon cancer     s/p resection & chemotherapy   • History of melanoma excision    • History of nonadherence to medical treatment    • History of syncope     s/p SJM PPM   • [place] (hard of hearing)     no aids   • Hypertension    • Hypothyroidism    • PAF (paroxysmal atrial fibrillation) (HCC)     prior Flecanide, Cardizem, Eliquis   • Severe aortic stenosis      Past Surgical History:   Past Surgical History:   Procedure Laterality Date   • CARDIAC PACEMAKER PLACEMENT  2020    [facility]   • COLON SURGERY  1992    colon resection with chemo   • CRANIOTOMY Right 1989    R bleed-from fall and hit head   • SKIN CANCER EXCISION Right     Melanoma R arm     Family History:  Family History   Problem Relation Age of Onset   • Heart disease Mother    • COPD Sister    • Emphysema Sister    • Colon cancer Brother    • Liver cancer Paternal Grandfather      Social History:  Social History     Substance and Sexual Activity   Alcohol Use Not Currently     Social History     Substance and Sexual Activity   Drug Use Never     Social History     Tobacco Use   Smoking Status Never   Smokeless Tobacco Never       Home Medications:   Prior to Admission medications    Medication Sig Start Date End Date Taking? Authorizing Provider   albuterol (PROVENTIL HFA,VENTOLIN HFA) 90 mcg/act inhaler Inhale 2 puffs every 6 (six) hours as needed for wheezing 2/29/24   Darrel Mathew PA-C   apixaban (ELIQUIS) 5 mg Take 1 tablet (5 mg total) by mouth 2 (two) times a day 5/4/24   Darrel Mathew PA-C   atorvastatin (LIPITOR) 20 mg tablet Take 1 tablet (20 mg total) by mouth daily 2/19/24   FLORENCIO Richardson   diltiazem (CARDIZEM CD) 180 mg  24 hr capsule Take 1 capsule (180 mg total) by mouth daily 7/23/24   Paras Arreguin MD   Fluticasone Furoate-Vilanterol 100-25 mcg/actuation inhaler Inhale 1 puff daily Rinse mouth after use.    Historical Provider, MD   losartan (COZAAR) 100 MG tablet Take 1 tablet (100 mg total) by mouth daily 2/29/24   Darrel Mathew PA-C   losartan (COZAAR) 50 mg tablet Take 50 mg by mouth daily  Patient not taking: Reported on 7/1/2024 5/3/24   Historical Provider, MD   metoprolol succinate (TOPROL-XL) 25 mg 24 hr tablet Take 1 tablet (25 mg total) by mouth daily 1/4/24   Kamar Schaffer MD   montelukast (SINGULAIR) 10 mg tablet Take 10 mg by mouth    Historical Provider, MD   ondansetron (Zofran ODT) 4 mg disintegrating tablet Take 1 tablet (4 mg total) by mouth every 6 (six) hours as needed for nausea or vomiting 1/14/24   Luis Carter PA-C   temazepam (RESTORIL) 15 mg capsule Take 15 mg by mouth daily at bedtime as needed for sleep    Historical Provider, MD   thyroid (ARMOUR) 15 MG tablet Take 15 mg by mouth daily    Historical Provider, MD   triamcinolone (KENALOG) 0.1 % cream APPLY TO AFFECTED AREA TWICE A DAY  Patient taking differently: Apply 1 Application topically 2 (two) times a day as needed To affected area 6/29/24   Darrel Mathew PA-C       Allergies:  Allergies   Allergen Reactions   • Codeine Nausea Only       Review of Systems:     Review of Systems   Constitutional: Negative.  Negative for activity change, diaphoresis, fatigue and unexpected weight change.   HENT:  Positive for dental problem.    Respiratory: Negative.  Negative for chest tightness, shortness of breath and wheezing.    Cardiovascular: Negative.  Negative for chest pain, palpitations and leg swelling.   Gastrointestinal: Negative.  Negative for blood in stool, constipation, diarrhea, nausea and vomiting.   Genitourinary: Negative.    Musculoskeletal: Negative.  Negative for arthralgias, back pain, gait problem and myalgias.   Skin:  "Negative.    Neurological: Negative.  Negative for dizziness, syncope, weakness, light-headedness, numbness and headaches.   Hematological: Negative.  Does not bruise/bleed easily.   All other systems reviewed and are negative.      Vital Signs:     Vitals:    08/08/24 1341 08/08/24 1351   BP: 163/67 148/65   BP Location: Left arm Right arm   Patient Position: Sitting Sitting   Cuff Size: Standard Standard   Pulse: 60    SpO2: 95%    Weight: 58.1 kg (128 lb)    Height: 5' 2\" (1.575 m)        Physical Exam:     Physical Exam  Constitutional:       General: She is not in acute distress.     Appearance: Normal appearance. She is well-developed. She is not ill-appearing or toxic-appearing.      Comments: Sitting on exam table in NAD   HENT:      Head: Normocephalic and atraumatic.   Neck:      Vascular: No carotid bruit or JVD.      Trachea: Trachea normal.   Cardiovascular:      Rate and Rhythm: Normal rate and regular rhythm.      Chest Wall: PMI is not displaced.      Pulses:           Carotid pulses are  on the right side with bruit.     Heart sounds: S1 normal and S2 normal. Murmur heard.      Systolic murmur is present with a grade of 5/6.      No friction rub. No gallop. No S3 or S4 sounds.      Comments: No heaves/lifts on palpation  Pulmonary:      Effort: Pulmonary effort is normal. No accessory muscle usage or respiratory distress.      Breath sounds: Normal breath sounds. No wheezing, rhonchi or rales.   Abdominal:      General: Bowel sounds are normal. There is no distension.      Palpations: Abdomen is not rigid. There is no mass.      Tenderness: There is no abdominal tenderness. There is no guarding or rebound.   Musculoskeletal:      Cervical back: Normal range of motion and neck supple.   Skin:     General: Skin is warm, dry and intact.      Comments: Trace b/l LE; no VV to b/l LE   Neurological:      Mental Status: She is alert and oriented to person, place, and time.      Cranial Nerves: No cranial " "nerve deficit.      Sensory: No sensory deficit.      Comments: No focal deficits   Psychiatric:         Mood and Affect: Mood and affect normal.       Lab Results:               Invalid input(s): \"LABGLOM\"      No results found for: \"HGBA1C\"  No results found for: \"CKTOTAL\", \"CKMB\", \"CKMBINDEX\", \"TROPONINI\"    Imaging Studies:     Echocardiogram: EF 60%, severe AS (mean 47, peak 67, RUPA 0.6cm2), mild MS, mild TR    I have personally reviewed pertinent reports.   and I have personally reviewed pertinent films in PACS    TAVR evaluation Assessment:     NYHC: II    STS Risk Score: 3.9 %, mortality risk    Aortic Stenosis Stage: C1    5 Meter Walk Test:      Attempt 1: 6   Attempt 2: 6   Attempt 3: 7    KCCQ-12 completed    Assessment:  Patient Active Problem List    Diagnosis Date Noted   • Stage 3a chronic kidney disease (HCC) 02/29/2024   • Severe aortic stenosis 02/29/2024   • Paroxysmal atrial fibrillation (HCC) 02/29/2024   • Carotid stenosis, asymptomatic, left 02/19/2024   • Hypothyroid 04/07/2023   • Primary hypertension 04/07/2023   • Mild intermittent asthma without complication 04/07/2023   • Pacemaker 04/07/2023   • Hearing impairment 04/07/2023   • History of colon cancer 04/07/2023   • History of melanoma 01/14/2013     Severe aortic stenosis; Ongoing TAVR workup    Plan:    Glendy Peet has severe symptomatic aortic stenosis. Based on their STS risk assessment, they will undergo the following testing for transcatheter aortic valve replacement: gated CTA of the chest, abdomen, and pelvis, cardiac catheterization, and dental clearance.    Once these studies have been completed, Glendy Pete will follow up in our office to review the results and confirm the suitability of proceeding with transcatheter aortic valve replacment.    Shared decision-making encounter occurred during this visit. Additionally, heart team evaluation of suitability for surgical replacement has been completed.      Glendy Pete was " comfortable with our recommendations, and their questions were answered to their satisfaction.  We will continue to evaluate the patient, with a final surgical recommendation pending the above work up.  Thank you for allowing us to participate in the care of this patient.     Routine referral to gastroenterology for colonoscopy screening was not indicated, as the patient is over 75 years old    SIGNATURE: Pura Gamboa PA-C  DATE: August 8, 2024  TIME: 2:30 PM    * This note was completed in part utilizing [employer] direct voice recognition software.   Grammatical errors, random word insertion, spelling mistakes, and incomplete sentences may be an occasional consequence of the system secondary to software limitations, ambient noise and hardware issues. At the time of dictation, efforts were made to edit, clarify and /or correct errors. Please read the chart carefully and recognize, using context, where substitutions have occurred.  If you have any questions or concerns about the context, text or information contained within the body of this dictation, please contact myself, the provider, for further clarification.   Attestation signed by Herbert Del Cid MD at 8/8/2024  3:50 PM:  Attending Attestation:    I supervised the Advanced Practitioner on 8/8/2024.  I discussed the case with the Advanced Practitioner, reviewed the note and agree.    The patient is a very pleasant 78-year-old female referred for evaluation of severe aortic stenosis.  She has significant history for severe carotid artery stenosis, tachybradycardia syndrome and PAF with pacemaker placement.  She was going to have carotid endarterectomy yesterday but anesthesia canceled the case for concerns of severe aortic stenosis.  The patient initially denied any symptoms, but upon unfortunate interrogation I was able to elucidate that she does have dyspnea with exertion, just yesterday she felt fatigue and short of breath after going up 2 flights of  steps, her daughter also tells us that she has not been very active over the last year which is a change from her previous status which make symptoms more difficult to elucidate.  I personally reviewed her diagnostic images, a TTE on 7/23/2024 shows EF 60%, aortic valve is heavily calcified, aortic valve peak velocity 4.1 m/s, mean gradient 47 mmHg, RUPA 0.6 cm² consistent with symptomatic severe aortic stenosis (stage D1).  I explained the diagnosis to the patient and the treatment options, I recommend TAVR.  Patient understands and agrees to proceed with surgery.  She will undergo preoperative testing and will return to schedule an elective operation.

## 2024-08-08 NOTE — TELEPHONE ENCOUNTER
----- Message from Alayna MENESES sent at 8/8/2024  3:19 PM EDT -----  Regarding: Cath  Please schedule patient for:     Pre TAVR Cardiac Cath: to be scheduled in the next few weeks. Patient has [facility] as primary insurance and had recent bloodwork.    Please schedule with either Dr. Bruno, Dr. Sanches, Dr. Zapien or Dr. Handley     To be done at: [facility]     To be done by:     Please address any questions regarding this request to Alayna Stern or [place],     Thank you

## 2024-08-14 ENCOUNTER — HOSPITAL ENCOUNTER (OUTPATIENT)
Dept: RADIOLOGY | Facility: HOSPITAL | Age: 79
Discharge: HOME | End: 2024-08-14
Payer: COMMERCIAL

## 2024-08-14 DIAGNOSIS — Z01.810 PREOP CARDIOVASCULAR EXAM: ICD-10-CM

## 2024-08-14 DIAGNOSIS — I65.22 CAROTID STENOSIS, ASYMPTOMATIC, LEFT: ICD-10-CM

## 2024-08-14 DIAGNOSIS — Z01.818 PREOP EXAMINATION: ICD-10-CM

## 2024-08-14 DIAGNOSIS — I35.0 SEVERE AORTIC STENOSIS: ICD-10-CM

## 2024-08-14 PROCEDURE — 75572 CT HRT W/3D IMAGE: CPT

## 2024-08-14 PROCEDURE — 74174 CTA ABD&PLVS W/CONTRAST: CPT

## 2024-08-14 RX ADMIN — IOHEXOL 85 ML: 350 INJECTION, SOLUTION INTRAVENOUS at 10:43

## 2024-08-23 ENCOUNTER — HOSPITAL ENCOUNTER (OUTPATIENT)
Facility: HOSPITAL | Age: 79
Setting detail: OUTPATIENT SURGERY
Discharge: HOME | End: 2024-08-23
Attending: INTERNAL MEDICINE | Admitting: INTERNAL MEDICINE
Payer: COMMERCIAL

## 2024-08-23 VITALS
TEMPERATURE: 97.5 F | HEART RATE: 63 BPM | DIASTOLIC BLOOD PRESSURE: 79 MMHG | OXYGEN SATURATION: 97 % | SYSTOLIC BLOOD PRESSURE: 177 MMHG | BODY MASS INDEX: 23 KG/M2 | HEIGHT: 62 IN | RESPIRATION RATE: 17 BRPM | WEIGHT: 125 LBS

## 2024-08-23 DIAGNOSIS — I35.0 SEVERE AORTIC STENOSIS: ICD-10-CM

## 2024-08-23 LAB
ATRIAL RATE: 72 BPM
P AXIS: 69 DEGREES
PR INTERVAL: 162 MS
QRS AXIS: 25 DEGREES
QRSD INTERVAL: 76 MS
QT INTERVAL: 420 MS
QTC INTERVAL: 459 MS
T WAVE AXIS: 73 DEGREES
VENTRICULAR RATE: 72 BPM

## 2024-08-23 PROCEDURE — 93454 CORONARY ARTERY ANGIO S&I: CPT | Performed by: INTERNAL MEDICINE

## 2024-08-23 PROCEDURE — C1769 GUIDE WIRE: HCPCS | Performed by: INTERNAL MEDICINE

## 2024-08-23 PROCEDURE — 93010 ELECTROCARDIOGRAM REPORT: CPT | Performed by: INTERNAL MEDICINE

## 2024-08-23 PROCEDURE — C1894 INTRO/SHEATH, NON-LASER: HCPCS | Performed by: INTERNAL MEDICINE

## 2024-08-23 PROCEDURE — 93005 ELECTROCARDIOGRAM TRACING: CPT

## 2024-08-23 PROCEDURE — 99153 MOD SED SAME PHYS/QHP EA: CPT | Performed by: INTERNAL MEDICINE

## 2024-08-23 PROCEDURE — 99152 MOD SED SAME PHYS/QHP 5/>YRS: CPT | Performed by: INTERNAL MEDICINE

## 2024-08-23 RX ORDER — NITROGLYCERIN 20 MG/100ML
INJECTION INTRAVENOUS CODE/TRAUMA/SEDATION MEDICATION
Status: DISCONTINUED | OUTPATIENT
Start: 2024-08-23 | End: 2024-08-23 | Stop reason: HOSPADM

## 2024-08-23 RX ORDER — ASPIRIN 81 MG/1
324 TABLET, CHEWABLE ORAL ONCE
Status: COMPLETED | OUTPATIENT
Start: 2024-08-23 | End: 2024-08-23

## 2024-08-23 RX ORDER — MIDAZOLAM HYDROCHLORIDE 2 MG/2ML
INJECTION, SOLUTION INTRAMUSCULAR; INTRAVENOUS CODE/TRAUMA/SEDATION MEDICATION
Status: DISCONTINUED | OUTPATIENT
Start: 2024-08-23 | End: 2024-08-23 | Stop reason: HOSPADM

## 2024-08-23 RX ORDER — SODIUM CHLORIDE 9 MG/ML
30 INJECTION, SOLUTION INTRAVENOUS CONTINUOUS
Status: DISCONTINUED | OUTPATIENT
Start: 2024-08-23 | End: 2024-08-23 | Stop reason: HOSPADM

## 2024-08-23 RX ORDER — VERAPAMIL HYDROCHLORIDE 2.5 MG/ML
INJECTION, SOLUTION INTRAVENOUS CODE/TRAUMA/SEDATION MEDICATION
Status: DISCONTINUED | OUTPATIENT
Start: 2024-08-23 | End: 2024-08-23 | Stop reason: HOSPADM

## 2024-08-23 RX ORDER — HEPARIN SODIUM 1000 [USP'U]/ML
INJECTION, SOLUTION INTRAVENOUS; SUBCUTANEOUS CODE/TRAUMA/SEDATION MEDICATION
Status: DISCONTINUED | OUTPATIENT
Start: 2024-08-23 | End: 2024-08-23 | Stop reason: HOSPADM

## 2024-08-23 RX ORDER — HYDRALAZINE HYDROCHLORIDE 20 MG/ML
5 INJECTION INTRAMUSCULAR; INTRAVENOUS EVERY 6 HOURS PRN
Status: DISCONTINUED | OUTPATIENT
Start: 2024-08-23 | End: 2024-08-23 | Stop reason: HOSPADM

## 2024-08-23 RX ORDER — DILTIAZEM HYDROCHLORIDE 180 MG/1
180 CAPSULE, COATED, EXTENDED RELEASE ORAL DAILY
Status: DISCONTINUED | OUTPATIENT
Start: 2024-08-23 | End: 2024-08-23 | Stop reason: HOSPADM

## 2024-08-23 RX ORDER — SODIUM CHLORIDE 9 MG/ML
100 INJECTION, SOLUTION INTRAVENOUS CONTINUOUS
Status: DISPENSED | OUTPATIENT
Start: 2024-08-23 | End: 2024-08-23

## 2024-08-23 RX ORDER — LIDOCAINE HYDROCHLORIDE 10 MG/ML
INJECTION, SOLUTION EPIDURAL; INFILTRATION; INTRACAUDAL; PERINEURAL CODE/TRAUMA/SEDATION MEDICATION
Status: DISCONTINUED | OUTPATIENT
Start: 2024-08-23 | End: 2024-08-23 | Stop reason: HOSPADM

## 2024-08-23 RX ORDER — METOPROLOL SUCCINATE 25 MG/1
25 TABLET, EXTENDED RELEASE ORAL DAILY
Status: DISCONTINUED | OUTPATIENT
Start: 2024-08-23 | End: 2024-08-23 | Stop reason: HOSPADM

## 2024-08-23 RX ORDER — FENTANYL CITRATE 50 UG/ML
INJECTION, SOLUTION INTRAMUSCULAR; INTRAVENOUS CODE/TRAUMA/SEDATION MEDICATION
Status: DISCONTINUED | OUTPATIENT
Start: 2024-08-23 | End: 2024-08-23 | Stop reason: HOSPADM

## 2024-08-23 RX ADMIN — METOPROLOL SUCCINATE 25 MG: 25 TABLET, EXTENDED RELEASE ORAL at 09:56

## 2024-08-23 RX ADMIN — SODIUM CHLORIDE 100 ML/HR: 0.9 INJECTION, SOLUTION INTRAVENOUS at 09:57

## 2024-08-23 RX ADMIN — ASPIRIN 81 MG CHEWABLE TABLET 324 MG: 81 TABLET CHEWABLE at 08:14

## 2024-08-23 RX ADMIN — DILTIAZEM HYDROCHLORIDE 180 MG: 180 CAPSULE, COATED, EXTENDED RELEASE ORAL at 09:56

## 2024-08-23 NOTE — INTERVAL H&P NOTE
"Update: (This section must be completed if the H&P was completed greater than 24 hrs to procedure or admission)    H&P reviewed. After examining the patient, I find no changed to the H&P since it had been written.    Glendy Pete, a 78 year old female, presents to [facility] for an outpatient elective cardiac catheterization as part of pre TAVR evaluation.     Consent obtained all questions answered.       BP (!) 218/91   Pulse 65   Temp 98.3 °F (36.8 °C) (Temporal)   Resp 18   Ht 5' 2\" (1.575 m)   Wt 56.7 kg (125 lb)   SpO2 97%   BMI 22.86 kg/m²       Patient re-evaluated. Accept as history and physical.    FLORENCIO Mckoy/August 23, 2024/8:11 AM  "

## 2024-08-23 NOTE — DISCHARGE INSTR - AVS FIRST PAGE
1. Please see the post cardiac catheterization dishcarge instructions.   No heavy lifting, greater than 10 lbs. or strenuous  activity for 48 hrs.    2.Remove band aid tomorrow.  Shower and wash area- wrist gently with soap and water- beginning tomorrow. Rinse and pat dry.  Apply new water seal band aid.  Repeat this process for 5 days. No powders, creams lotions or antibiotic ointments  for 5 days.  No tub baths, hot tubs or swimming for 5 days.     3. Please call our office ([phone]) if you have any fever, redness, swelling, discharge from your wrist access site.    4.No driving for 1 day

## 2024-09-13 ENCOUNTER — OFFICE VISIT (OUTPATIENT)
Dept: CARDIOLOGY CLINIC | Facility: CLINIC | Age: 79
End: 2024-09-13
Payer: COMMERCIAL

## 2024-09-13 ENCOUNTER — TELEPHONE (OUTPATIENT)
Dept: CARDIAC SURGERY | Facility: CLINIC | Age: 79
End: 2024-09-13

## 2024-09-13 VITALS
RESPIRATION RATE: 16 BRPM | HEART RATE: 74 BPM | BODY MASS INDEX: 23.92 KG/M2 | SYSTOLIC BLOOD PRESSURE: 120 MMHG | DIASTOLIC BLOOD PRESSURE: 80 MMHG | HEIGHT: 62 IN | OXYGEN SATURATION: 98 % | WEIGHT: 130 LBS

## 2024-09-13 DIAGNOSIS — I10 PRIMARY HYPERTENSION: ICD-10-CM

## 2024-09-13 DIAGNOSIS — Z95.0 PACEMAKER: ICD-10-CM

## 2024-09-13 DIAGNOSIS — I48.0 PAROXYSMAL ATRIAL FIBRILLATION (HCC): ICD-10-CM

## 2024-09-13 DIAGNOSIS — I35.0 SEVERE AORTIC STENOSIS: Primary | ICD-10-CM

## 2024-09-13 PROCEDURE — 99214 OFFICE O/P EST MOD 30 MIN: CPT

## 2024-09-13 NOTE — PROGRESS NOTES
[facility] [place]  [address]  [place] [address]  Cardiology Office Note    Glendy Pete 78 y.o. female MRN: [id number]    09/13/24          Assessment/Plan:  Assessment & Plan  Severe aortic stenosis  Patient following with CT surgery and undergoing evaluation for TAVR.  Paroxysmal atrial fibrillation (HCC)  Patient maintaining sinus rhythm  [id number] 5 (age +2, female, HTN, Carotid stenosis)  Rate/Rhythm control: Metoprolol succinate 25 mg daily  AC: Eliquis 5 mg twice daily    Primary hypertension  Continue metoprolol succinate 25 mg daily.  Pacemaker  S/p tachybradycardia syndrome  Patient follows with device clinic         Follow up: 2 months or sooner as needed  All questions and concerns addressed.  Patient was advised to report any problems requiring medical attention.          1. Severe aortic stenosis        2. Paroxysmal atrial fibrillation (HCC)        3. Primary hypertension        4. Pacemaker            HPI: Glendy Pete is a 78 y.o. year old female with PMH of PAF, aortic stenosis, hypertension, severe carotid artery stenosis, tachybradycardia syndrome s/p PPM, who presents for follow-up. Patient is known to Dr. Arreguin.    Patient is currently undergoing evaluation for TAVR. She had a cardiac cath which revealed nonobstructive CAD. Patient denies chest pain or significant shortness of breath.     She initially was about to undergo a carotid endarterectomy but the procedure was cancelled due to severe aortic stenosis.    Patient denies any bleeding issues on Eliquis.     Patient's blood pressure looks good.      Patient was instructed to call the office or seek medical attention if any significant chest pain, shortness of breath, palpitations, or lower extremity swelling develop. All medications reviewed and patient is tolerating medications without side effects.     Social history:   Patient denies tobacco, significant alcohol, or recreational drug  use.            Patient Active Problem List   Diagnosis    History of melanoma    Hypothyroid    Primary hypertension    Mild intermittent asthma without complication    Pacemaker    Hearing impairment    History of colon cancer    Carotid stenosis, asymptomatic, left    Stage 3a chronic kidney disease (HCC)    Severe aortic stenosis    Paroxysmal atrial fibrillation (HCC)       Allergies   Allergen Reactions    Codeine Nausea Only         Current Outpatient Medications:     albuterol (PROVENTIL HFA,VENTOLIN HFA) 90 mcg/act inhaler, Inhale 2 puffs every 6 (six) hours as needed for wheezing, Disp: 18 g, Rfl: 5    apixaban (ELIQUIS) 5 mg, Take 1 tablet (5 mg total) by mouth 2 (two) times a day, Disp: 60 tablet, Rfl: 5    diltiazem (CARDIZEM CD) 180 mg 24 hr capsule, Take 1 capsule (180 mg total) by mouth daily, Disp: 30 capsule, Rfl: 10    metoprolol succinate (TOPROL-XL) 25 mg 24 hr tablet, Take 1 tablet (25 mg total) by mouth daily, Disp: 90 tablet, Rfl: 3    Fluticasone Furoate-Vilanterol 100-25 mcg/actuation inhaler, Inhale 1 puff daily Rinse mouth after use. (Patient not taking: Reported on 9/13/2024), Disp: , Rfl:     Current Facility-Administered Medications:     lidocaine (XYLOCAINE) 4 % topical solution 5 mL, 5 mL, Topical, Once, Geno Baez PA-C    Past Medical History:   Diagnosis Date    Asthma     Carotid artery stenosis     left asymptomatic 75-80%, right asymptomatic 40-45%    Carotid stenosis, asymptomatic, left     70%    CKD (chronic kidney disease), stage III (HCC)     baseline Cr 0.8-1.0    History of colon cancer     s/p resection & chemotherapy    History of melanoma excision     History of nonadherence to medical treatment     History of syncope     s/p SJM PPM    [place] (hard of hearing)     no aids    Hypertension     Hypothyroidism     PAF (paroxysmal atrial fibrillation) (HCC)     prior Flecanide, Cardizem, Eliquis    Severe aortic stenosis        Family History   Problem Relation Age of  Onset    Heart disease Mother     COPD Sister     Emphysema Sister     Colon cancer Brother     Liver cancer Paternal Grandfather        Past Surgical History:   Procedure Laterality Date    CARDIAC CATHETERIZATION N/A 8/23/2024    Procedure: Cardiac Coronary Angiogram;  Surgeon: Olga Ldiia Handley DO;  Location: [facility];  Service: Cardiology    CARDIAC CATHETERIZATION  8/23/2024    Procedure: Cardiac catheterization;  Surgeon: Olga Lidia Handley DO;  Location: [facility];  Service: Cardiology    [facility]    COLON SURGERY  1992    colon resection with chemo    CRANIOTOMY Right 1989    R bleed-from fall and hit head    SKIN CANCER EXCISION Right     Melanoma R arm       Social History     Socioeconomic History    Marital status: Single     Spouse name: Not on file    Number of children: Not on file    Years of education: Not on file    Highest education level: Not on file   Occupational History    Not on file   Tobacco Use    Smoking status: Never    Smokeless tobacco: Never   Vaping Use    Vaping status: Never Used   Substance and Sexual Activity    Alcohol use: Not Currently    Drug use: Never    Sexual activity: Not Currently   Other Topics Concern    Not on file   Social History Narrative    Divorsed        1 child    Hobbies-art    Reg dental care, brushes twice daily     Social Determinants of Health     Financial Resource Strain: Low Risk  (5/26/2023)    Overall Financial Resource Strain (CARDIA)     Difficulty of Paying Living Expenses: Not hard at all   Food Insecurity: No Food Insecurity (6/6/2024)    Hunger Vital Sign     Worried About Running Out of Food in the Last Year: Never true     Ran Out of Food in the Last Year: Never true   Transportation Needs: No Transportation Needs (6/6/2024)    PRAPARE - Transportation     Lack of Transportation (Medical): No     Lack of Transportation (Non-Medical): No   Physical Activity: Not on  "file   Stress: Not on file   Social Connections: Not on file   Intimate Partner Violence: Not on file   Housing Stability: Low Risk  (6/6/2024)    Housing Stability Vital Sign     Unable to Pay for Housing in the Last Year: No     Number of Times Moved in the Last Year: 0     Homeless in the Last Year: No       Review of symptoms:   Review of Systems   Constitutional:  Negative for chills, diaphoresis and fever.   Respiratory:  Negative for cough, chest tightness and shortness of breath.    Cardiovascular:  Negative for chest pain, palpitations and leg swelling.   Gastrointestinal:  Negative for abdominal distention, blood in stool, nausea and vomiting.   Genitourinary:  Negative for difficulty urinating.   Musculoskeletal:  Negative for arthralgias and back pain.   Neurological:  Negative for dizziness, syncope, light-headedness and headaches.   Psychiatric/Behavioral:  Negative for agitation and confusion. The patient is not nervous/anxious.         Vitals: /80 (BP Location: Right arm, Patient Position: Sitting, Cuff Size: Standard)   Pulse 74   Resp 16   Ht 5' 2\" (1.575 m)   Wt 59 kg (130 lb)   SpO2 98%   BMI 23.78 kg/m²         Physical Exam:     Physical Exam  Vitals and nursing note reviewed.   Constitutional:       General: She is not in acute distress.     Appearance: She is well-developed.   HENT:      Head: Normocephalic and atraumatic.   Eyes:      Conjunctiva/sclera: Conjunctivae normal.   Neck:      Vascular: No carotid bruit.   Cardiovascular:      Rate and Rhythm: Normal rate and regular rhythm.      Heart sounds: Murmur heard.   Pulmonary:      Effort: Pulmonary effort is normal. No respiratory distress.      Breath sounds: Normal breath sounds.   Abdominal:      Palpations: Abdomen is soft.      Tenderness: There is no abdominal tenderness.   Musculoskeletal:         General: No swelling.      Cervical back: Neck supple.      Right lower leg: No edema.      Left lower leg: No edema. "   Skin:     General: Skin is warm and dry.      Capillary Refill: Capillary refill takes less than 2 seconds.   Neurological:      Mental Status: She is alert and oriented to person, place, and time.   Psychiatric:         Mood and Affect: Mood normal.            Thank you for allowing me to participate in the care and evaluation of your patient.  Should you have any questions, please feel free to contact me.

## 2024-09-13 NOTE — ASSESSMENT & PLAN NOTE
Patient maintaining sinus rhythm  [id number] 5 (age +2, female, HTN, Carotid stenosis)  Rate/Rhythm control: Metoprolol succinate 25 mg daily  AC: Eliquis 5 mg twice daily

## 2024-09-19 ENCOUNTER — TELEPHONE (OUTPATIENT)
Dept: CARDIAC SURGERY | Facility: CLINIC | Age: 79
End: 2024-09-19

## 2024-09-19 NOTE — TELEPHONE ENCOUNTER
Spoke with patient's daughter Piedad, asking what the status on her dental clearance was. She stated as of right now she didn't think she had seen someone yet but would call her today to see where she was at. Asked that she call me back to let me know as this is one of the last things we need before moving forward with surgery. She understood.     PLEASE TRANSFER CALL TO OFFICE
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
## 2024-10-28 ENCOUNTER — OFFICE VISIT (OUTPATIENT)
Dept: VASCULAR SURGERY | Facility: CLINIC | Age: 79
End: 2024-10-28
Payer: COMMERCIAL

## 2024-10-28 VITALS
WEIGHT: 130 LBS | BODY MASS INDEX: 23.92 KG/M2 | SYSTOLIC BLOOD PRESSURE: 150 MMHG | DIASTOLIC BLOOD PRESSURE: 76 MMHG | HEIGHT: 62 IN | HEART RATE: 60 BPM

## 2024-10-28 DIAGNOSIS — I65.22 CAROTID STENOSIS, ASYMPTOMATIC, LEFT: Primary | ICD-10-CM

## 2024-10-28 PROCEDURE — 99214 OFFICE O/P EST MOD 30 MIN: CPT | Performed by: SURGERY

## 2024-10-28 NOTE — LETTER
October 28, 2024     Darrel Mathew PA-C  [address]  [facility] [address]    Patient: Glendy Pete   YOB: 1945   Date of Visit: 10/28/2024       Dear Dr. Mathew:    Thank you for referring Glendy Pete to me for evaluation. Below are my notes for this consultation.    If you have questions, please do not hesitate to call me. I look forward to following your patient along with you.         Sincerely,        Byron Almanza MD        CC: Glendy Pete    Byron Almanza MD  10/28/2024 10:49 AM  Sign when Signing Visit  Assessment/Plan:      Diagnoses and all orders for this visit:    Carotid stenosis, asymptomatic, left        High-grade left carotid artery stenosis. Asymptomatic. The patient has severe aortic stenosis and appears to be in the scheduling process for TAVR. We would need to perform a TAVR prior to elective carotid surgery. She can maintain on her medical regimen currently. We will rediscuss in 6 months with another duplex. I left a voicemail for her daughter Piedad.   Subjective:     Patient ID: Glendy Pete is a 79 y.o. female.    Patient presents today to discuss L CEA. Denies any s/s of CVA.     ANAT Pike is a pleasant 79-year-old female previously scheduled for a left carotid endarterectomy.  She was determined to have severe aortic stenosis and canceled on the day of surgery.  At that time we referred her for a TAVR evaluation.  She did have that evaluation and was recommended for TAVR.  At this time it does not appear that she is currently scheduled.  I left a voicemail for her daughter Piedad who helps her organize her medical information.  At this time we will not perform elective asymptomatic carotid surgery in the setting of severe aortic stenosis.  The patient has an understanding of this process.  We reviewed the symptoms of stroke to include upper or lower extremity numbness or weakness, facial numbness or weakness.  We reviewed symptoms of speech  disturbance or temporary blindness which are also negative.  At this time she remains asymptomatic.    Review of Systems   Constitutional: Negative.    HENT: Negative.     Eyes: Negative.    Respiratory: Negative.     Cardiovascular: Negative.    Gastrointestinal: Negative.    Endocrine: Negative.    Genitourinary: Negative.    Musculoskeletal: Negative.    Skin: Negative.    Allergic/Immunologic: Negative.    Neurological: Negative.    Hematological: Negative.    Psychiatric/Behavioral: Negative.           Objective:     Physical Exam  Constitutional:       Appearance: Normal appearance.   HENT:      Head: Normocephalic and atraumatic.      Nose: No congestion or rhinorrhea.   Eyes:      Extraocular Movements: Extraocular movements intact.      Pupils: Pupils are equal, round, and reactive to light.   Cardiovascular:      Rate and Rhythm: Normal rate and regular rhythm.   Pulmonary:      Effort: Pulmonary effort is normal.      Breath sounds: No stridor.   Abdominal:      General: There is no distension.      Tenderness: There is no abdominal tenderness.   Musculoskeletal:         General: No swelling. Normal range of motion.      Cervical back: Normal range of motion and neck supple.   Skin:     General: Skin is warm.      Coloration: Skin is not jaundiced.   Neurological:      General: No focal deficit present.      Mental Status: She is alert and oriented to person, place, and time.   Psychiatric:         Mood and Affect: Mood normal.         Behavior: Behavior normal.

## 2024-10-28 NOTE — ASSESSMENT & PLAN NOTE
High-grade left carotid artery stenosis.  Asymptomatic.  The patient has severe aortic stenosis and appears to be in the scheduling process for TAVR.  We would need to perform a TAVR prior to elective carotid surgery.  She can maintain on her medical regimen currently.  We will rediscuss in 6 months with another duplex.  I left a voicemail for her daughter Piedad.

## 2024-11-07 DIAGNOSIS — I48.0 PAROXYSMAL ATRIAL FIBRILLATION (HCC): ICD-10-CM

## 2024-11-08 RX ORDER — DILTIAZEM HYDROCHLORIDE 180 MG/1
180 CAPSULE, COATED, EXTENDED RELEASE ORAL DAILY
Qty: 90 CAPSULE | Refills: 1 | Status: SHIPPED | OUTPATIENT
Start: 2024-11-08

## 2024-12-20 ENCOUNTER — OFFICE VISIT (OUTPATIENT)
Dept: INTERNAL MEDICINE CLINIC | Facility: CLINIC | Age: 79
End: 2024-12-20
Payer: COMMERCIAL

## 2024-12-20 VITALS
TEMPERATURE: 97.5 F | HEIGHT: 62 IN | OXYGEN SATURATION: 96 % | BODY MASS INDEX: 23.08 KG/M2 | WEIGHT: 125.4 LBS | DIASTOLIC BLOOD PRESSURE: 78 MMHG | SYSTOLIC BLOOD PRESSURE: 132 MMHG | RESPIRATION RATE: 18 BRPM | HEART RATE: 90 BPM

## 2024-12-20 DIAGNOSIS — R39.9 UTI SYMPTOMS: Primary | ICD-10-CM

## 2024-12-20 LAB
SL AMB  POCT GLUCOSE, UA: NEGATIVE
SL AMB LEUKOCYTE ESTERASE,UA: 70
SL AMB POCT BILIRUBIN,UA: NEGATIV E
SL AMB POCT BLOOD,UA: ABNORMAL
SL AMB POCT CLARITY,UA: CLEAR
SL AMB POCT COLOR,UA: ABNORMAL
SL AMB POCT KETONES,UA: NEGATIVE
SL AMB POCT NITRITE,UA: POSITIVE
SL AMB POCT PH,UA: 6
SL AMB POCT SPECIFIC GRAVITY,UA: 1.03
SL AMB POCT URINE PROTEIN: 0.3
SL AMB POCT UROBILINOGEN: 3.2

## 2024-12-20 PROCEDURE — G2211 COMPLEX E/M VISIT ADD ON: HCPCS

## 2024-12-20 PROCEDURE — 99213 OFFICE O/P EST LOW 20 MIN: CPT

## 2024-12-20 PROCEDURE — 81002 URINALYSIS NONAUTO W/O SCOPE: CPT

## 2024-12-20 RX ORDER — NITROFURANTOIN 25; 75 MG/1; MG/1
100 CAPSULE ORAL 2 TIMES DAILY
Qty: 10 CAPSULE | Refills: 0 | Status: SHIPPED | OUTPATIENT
Start: 2024-12-20 | End: 2024-12-25

## 2024-12-20 NOTE — PROGRESS NOTES
"Name: Glendy Pete      : 1945      MRN: [id number]  Encounter Provider: FLORENCIO Varela  Encounter Date: 2024   Encounter department: [facility] INTERNAL MEDICINE [place]  :  Assessment & Plan  UTI symptoms  Symptoms have been persistent for 1 week with fatigue accompanying. Urine dip in office positive for nitrites, blood, and leukocytes. Will treat for UTI.    Orders:  •  POCT urine dip  •  nitrofurantoin (MACROBID) 100 mg capsule; Take 1 capsule (100 mg total) by mouth 2 (two) times a day for 5 days           History of Present Illness {?Quick Links Encounters * My Last Note * Last Note in Specialty * Snapshot * Since Last Visit * History :71365}    Glendy is here today for feelings of extreme fatigue with poor concentration. She states \"I just really feel run down.\" She denies abdominal pain, there's no burning when she urinates but she does have some urgency. She states she has had urinary tract infections in the past.      Review of Systems   Constitutional:  Positive for activity change and fatigue. Negative for appetite change, chills and fever.   HENT: Negative.     Eyes:  Negative for pain and visual disturbance.   Respiratory:  Negative for cough, chest tightness and shortness of breath.    Cardiovascular:  Negative for chest pain, palpitations and leg swelling.   Gastrointestinal:  Negative for abdominal distention, abdominal pain, constipation, diarrhea, nausea and vomiting.   Endocrine: Negative.    Genitourinary:  Positive for frequency and urgency. Negative for difficulty urinating, dyspareunia, dysuria, enuresis, flank pain, hematuria and pelvic pain.   Musculoskeletal:  Negative for arthralgias and back pain.   Skin:  Negative for color change and rash.   Allergic/Immunologic: Negative.    Neurological:  Negative for dizziness, seizures, syncope and headaches.   Hematological: Negative.    Psychiatric/Behavioral: Negative.     All other systems reviewed and are " "negative.      Objective {?Quick Links Trend Vitals * Enter New Vitals * Results Review * Timeline (Adult) * Labs * Imaging * Cardiology * Procedures * Lung Cancer Screening * Surgical eConsent :17674}  /78 (BP Location: Left arm, Patient Position: Sitting, Cuff Size: Standard)   Pulse 90   Temp 97.5 °F (36.4 °C) (Tympanic)   Resp 18   Ht 5' 2\" (1.575 m)   Wt 56.9 kg (125 lb 6.4 oz)   SpO2 96%   BMI 22.94 kg/m²      Physical Exam  Vitals and nursing note reviewed.   Constitutional:       General: She is not in acute distress.     Appearance: Normal appearance. She is well-developed.   HENT:      Nose: Congestion present.   Eyes:      Conjunctiva/sclera: Conjunctivae normal.   Cardiovascular:      Rate and Rhythm: Normal rate and regular rhythm.      Pulses: Normal pulses.      Heart sounds: Normal heart sounds. No murmur heard.  Pulmonary:      Effort: Pulmonary effort is normal. No respiratory distress.      Breath sounds: Normal breath sounds.   Abdominal:      General: Bowel sounds are normal. There is no distension.      Palpations: Abdomen is soft.      Tenderness: There is no abdominal tenderness. There is no right CVA tenderness or left CVA tenderness.   Musculoskeletal:         General: No swelling. Normal range of motion.      Cervical back: Normal range of motion and neck supple.   Skin:     General: Skin is warm and dry.      Capillary Refill: Capillary refill takes less than 2 seconds.   Neurological:      Mental Status: She is alert and oriented to person, place, and time.   Psychiatric:         Mood and Affect: Mood normal.       "

## 2025-01-20 ENCOUNTER — OFFICE VISIT (OUTPATIENT)
Dept: DENTISTRY | Facility: CLINIC | Age: 80
End: 2025-01-20

## 2025-01-20 VITALS — TEMPERATURE: 99.1 F | DIASTOLIC BLOOD PRESSURE: 72 MMHG | SYSTOLIC BLOOD PRESSURE: 178 MMHG | HEART RATE: 71 BPM

## 2025-01-20 DIAGNOSIS — Z01.21 ENCOUNTER FOR DENTAL EXAMINATION AND CLEANING WITH ABNORMAL FINDINGS: Primary | ICD-10-CM

## 2025-01-20 PROCEDURE — D0230 INTRAORAL - PERIAPICAL EACH ADDITIONAL RADIOGRAPHIC IMAGE: HCPCS | Performed by: DENTIST

## 2025-01-20 PROCEDURE — D0140 LIMITED ORAL EVALUATION - PROBLEM FOCUSED: HCPCS | Performed by: DENTIST

## 2025-01-20 PROCEDURE — D0220 INTRAORAL - PERIAPICAL FIRST RADIOGRAPHIC IMAGE: HCPCS | Performed by: DENTIST

## 2025-01-20 NOTE — PROGRESS NOTES
Pt. Presented for dental clearance prior to a heart surgery.  Reviewing [facility]  ASA : III  Pain Level : 0     X3 Pas x-rays to lower anterior area where teeth # 22 and # 23 are in bad status and a three roots of # 24,25,and # 26 are covered  with an existing removable dental prosthesis.    Situation explained to the pt. And her daughter ( daughter  stayed in the treatment room during the entire visit )and a surgical extraction to all those teeth recommended before getting the requested medical clearance.    Pt referred to [facility] for extractions.    Pt. And her daughter understood and left satisfied.    NV : Full dentures

## 2025-01-23 ENCOUNTER — IN-CLINIC DEVICE VISIT (OUTPATIENT)
Dept: CARDIOLOGY CLINIC | Facility: CLINIC | Age: 80
End: 2025-01-23
Payer: COMMERCIAL

## 2025-01-23 DIAGNOSIS — Z95.0 PRESENCE OF PERMANENT CARDIAC PACEMAKER: Primary | ICD-10-CM

## 2025-01-23 PROCEDURE — 93280 PM DEVICE PROGR EVAL DUAL: CPT | Performed by: INTERNAL MEDICINE

## 2025-01-23 NOTE — PROGRESS NOTES
[facility] DC PM/ACTIVE SYSTEM IS MRI CONDITIONAL   DEVICE INTERROGATED IN THE [place] OFFICE:  BATTERY VOLTAGE ADEQUATE (6.8-7.2 YR.).  AP 54%  1.5%.  ALL LEAD PARAMETERS WITHIN NORMAL LIMITS.  ELEVATED RV PACING THRESHOLD; 3.25V/0.5MS, 3V/1MS,  1.5%,  AUTOCAPTURE IS ENABLED.  614 AMS EPISODES AND 36 HVR EPISODES.  ALL AVAILABLE EGMS SHOW AF WITH RVR UP  BPM.  PATIENT IS ASYMPTOMATIC WITH EPISODES, TAKES DILTIAZEM, ELIQUIS, AND METOPROLOL.  NO PROGRAMMING CHANGES MADE TO DEVICE PARAMETERS.  NORMAL DEVICE FUNCTION.  RG

## 2025-01-24 ENCOUNTER — RESULTS FOLLOW-UP (OUTPATIENT)
Dept: CARDIOLOGY CLINIC | Facility: CLINIC | Age: 80
End: 2025-01-24

## 2025-01-24 ENCOUNTER — TELEPHONE (OUTPATIENT)
Dept: DENTISTRY | Facility: CLINIC | Age: 80
End: 2025-01-24

## 2025-01-24 NOTE — RESULT ENCOUNTER NOTE
Normal Device Function  RV capture threshold is high  However V paced only 1.5%  Auto capture has been turned on

## 2025-01-30 ENCOUNTER — OFFICE VISIT (OUTPATIENT)
Dept: INTERNAL MEDICINE CLINIC | Facility: CLINIC | Age: 80
End: 2025-01-30
Payer: COMMERCIAL

## 2025-01-30 VITALS
SYSTOLIC BLOOD PRESSURE: 126 MMHG | DIASTOLIC BLOOD PRESSURE: 84 MMHG | HEIGHT: 62 IN | BODY MASS INDEX: 22.94 KG/M2 | TEMPERATURE: 98.6 F | OXYGEN SATURATION: 96 % | HEART RATE: 78 BPM

## 2025-01-30 DIAGNOSIS — R63.0 ANOREXIA: Primary | ICD-10-CM

## 2025-01-30 DIAGNOSIS — E03.9 HYPOTHYROIDISM, UNSPECIFIED TYPE: ICD-10-CM

## 2025-01-30 DIAGNOSIS — J45.20 MILD INTERMITTENT ASTHMA WITHOUT COMPLICATION: ICD-10-CM

## 2025-01-30 DIAGNOSIS — R11.0 NAUSEA: ICD-10-CM

## 2025-01-30 DIAGNOSIS — Z13.6 SCREENING FOR HEART DISEASE: ICD-10-CM

## 2025-01-30 PROCEDURE — 99214 OFFICE O/P EST MOD 30 MIN: CPT | Performed by: PHYSICIAN ASSISTANT

## 2025-01-30 PROCEDURE — G2211 COMPLEX E/M VISIT ADD ON: HCPCS | Performed by: PHYSICIAN ASSISTANT

## 2025-01-30 RX ORDER — ALBUTEROL SULFATE 90 UG/1
2 INHALANT RESPIRATORY (INHALATION) EVERY 6 HOURS PRN
Qty: 18 G | Refills: 5 | Status: SHIPPED | OUTPATIENT
Start: 2025-01-30

## 2025-01-31 ENCOUNTER — APPOINTMENT (OUTPATIENT)
Dept: LAB | Facility: HOSPITAL | Age: 80
End: 2025-01-31
Payer: COMMERCIAL

## 2025-01-31 DIAGNOSIS — E03.9 HYPOTHYROIDISM, UNSPECIFIED TYPE: ICD-10-CM

## 2025-01-31 DIAGNOSIS — R63.0 ANOREXIA: ICD-10-CM

## 2025-01-31 DIAGNOSIS — R11.0 NAUSEA: ICD-10-CM

## 2025-01-31 DIAGNOSIS — Z13.6 SCREENING FOR HEART DISEASE: ICD-10-CM

## 2025-01-31 LAB
ALBUMIN SERPL BCG-MCNC: 4.2 G/DL (ref 3.5–5)
ALP SERPL-CCNC: 62 U/L (ref 34–104)
ALT SERPL W P-5'-P-CCNC: 9 U/L (ref 7–52)
ANION GAP SERPL CALCULATED.3IONS-SCNC: 7 MMOL/L (ref 4–13)
AST SERPL W P-5'-P-CCNC: 18 U/L (ref 13–39)
BASOPHILS # BLD AUTO: 0.03 THOUSANDS/ΜL (ref 0–0.1)
BASOPHILS NFR BLD AUTO: 1 % (ref 0–1)
BILIRUB SERPL-MCNC: 0.59 MG/DL (ref 0.2–1)
BUN SERPL-MCNC: 17 MG/DL (ref 5–25)
CALCIUM SERPL-MCNC: 10.2 MG/DL (ref 8.4–10.2)
CHLORIDE SERPL-SCNC: 106 MMOL/L (ref 96–108)
CHOLEST SERPL-MCNC: 196 MG/DL (ref ?–200)
CO2 SERPL-SCNC: 27 MMOL/L (ref 21–32)
CREAT SERPL-MCNC: 0.85 MG/DL (ref 0.6–1.3)
EOSINOPHIL # BLD AUTO: 0.19 THOUSAND/ΜL (ref 0–0.61)
EOSINOPHIL NFR BLD AUTO: 3 % (ref 0–6)
ERYTHROCYTE [DISTWIDTH] IN BLOOD BY AUTOMATED COUNT: 13.7 % (ref 11.6–15.1)
GFR SERPL CREATININE-BSD FRML MDRD: 65 ML/MIN/1.73SQ M
GLUCOSE P FAST SERPL-MCNC: 95 MG/DL (ref 65–99)
HCT VFR BLD AUTO: 41.7 % (ref 34.8–46.1)
HDLC SERPL-MCNC: 60 MG/DL
HGB BLD-MCNC: 13.6 G/DL (ref 11.5–15.4)
IMM GRANULOCYTES # BLD AUTO: 0.01 THOUSAND/UL (ref 0–0.2)
IMM GRANULOCYTES NFR BLD AUTO: 0 % (ref 0–2)
LDLC SERPL CALC-MCNC: 120 MG/DL (ref 0–100)
LYMPHOCYTES # BLD AUTO: 1.54 THOUSANDS/ΜL (ref 0.6–4.47)
LYMPHOCYTES NFR BLD AUTO: 25 % (ref 14–44)
MCH RBC QN AUTO: 29.4 PG (ref 26.8–34.3)
MCHC RBC AUTO-ENTMCNC: 32.6 G/DL (ref 31.4–37.4)
MCV RBC AUTO: 90 FL (ref 82–98)
MONOCYTES # BLD AUTO: 0.56 THOUSAND/ΜL (ref 0.17–1.22)
MONOCYTES NFR BLD AUTO: 9 % (ref 4–12)
NEUTROPHILS # BLD AUTO: 3.93 THOUSANDS/ΜL (ref 1.85–7.62)
NEUTS SEG NFR BLD AUTO: 62 % (ref 43–75)
NONHDLC SERPL-MCNC: 136 MG/DL
NRBC BLD AUTO-RTO: 0 /100 WBCS
PLATELET # BLD AUTO: 236 THOUSANDS/UL (ref 149–390)
PMV BLD AUTO: 9.1 FL (ref 8.9–12.7)
POTASSIUM SERPL-SCNC: 4.6 MMOL/L (ref 3.5–5.3)
PROT SERPL-MCNC: 6.9 G/DL (ref 6.4–8.4)
RBC # BLD AUTO: 4.62 MILLION/UL (ref 3.81–5.12)
SODIUM SERPL-SCNC: 140 MMOL/L (ref 135–147)
T4 FREE SERPL-MCNC: 0.85 NG/DL (ref 0.61–1.12)
TRIGL SERPL-MCNC: 79 MG/DL (ref ?–150)
TSH SERPL DL<=0.05 MIU/L-ACNC: 4.29 UIU/ML (ref 0.45–4.5)
WBC # BLD AUTO: 6.26 THOUSAND/UL (ref 4.31–10.16)

## 2025-01-31 PROCEDURE — 80053 COMPREHEN METABOLIC PANEL: CPT

## 2025-01-31 PROCEDURE — 84443 ASSAY THYROID STIM HORMONE: CPT

## 2025-01-31 PROCEDURE — 36415 COLL VENOUS BLD VENIPUNCTURE: CPT

## 2025-01-31 PROCEDURE — 84439 ASSAY OF FREE THYROXINE: CPT

## 2025-01-31 PROCEDURE — 85025 COMPLETE CBC W/AUTO DIFF WBC: CPT

## 2025-01-31 PROCEDURE — 80061 LIPID PANEL: CPT

## 2025-02-03 ENCOUNTER — RESULTS FOLLOW-UP (OUTPATIENT)
Dept: INTERNAL MEDICINE CLINIC | Facility: CLINIC | Age: 80
End: 2025-02-03

## 2025-02-05 ENCOUNTER — TELEPHONE (OUTPATIENT)
Age: 80
End: 2025-02-05

## 2025-02-05 DIAGNOSIS — I48.0 PAROXYSMAL ATRIAL FIBRILLATION (HCC): ICD-10-CM

## 2025-02-05 DIAGNOSIS — Z95.0 PRESENCE OF PERMANENT CARDIAC PACEMAKER: ICD-10-CM

## 2025-02-05 DIAGNOSIS — E78.2 MIXED HYPERLIPIDEMIA: Primary | ICD-10-CM

## 2025-02-05 RX ORDER — ATORVASTATIN CALCIUM 20 MG/1
20 TABLET, FILM COATED ORAL DAILY
Qty: 90 TABLET | Refills: 1 | Status: SHIPPED | OUTPATIENT
Start: 2025-02-05

## 2025-02-05 NOTE — TELEPHONE ENCOUNTER
Pt's daughter calls regarding Eliquis, states it costs $500 until pt meets deductible.  She's asking if there is an alternative medication to this.  Please advise.

## 2025-02-05 NOTE — TELEPHONE ENCOUNTER
Piedad (on communication consent) called, read results. She states her mom was on atorvastatin but just stopped taking it. Piedad is asking if the provider can please re prescribe the atorvastatin. Piedad is also asking for an alternative for Eliquis. She states that the medication is $500 and can not pay for it right now. Please advise and notify Piedad

## 2025-02-05 NOTE — TELEPHONE ENCOUNTER
Medication: diltiazem     Dose/Frequency: 180 mg daily    Quantity: 90    Pharmacy: [facility]    Office:   [] PCP/Provider -   [x] Speciality/Provider - Dr. Arreguin    Does the patient have enough for 3 days?   [x] Yes   [] No - Send as HP to POD    Medication: Metoprolol succinate    Dose/Frequency: 25 mg daily    Quantity: 90    Pharmacy: [facility]    Office:   [] PCP/Provider -   [x] Speciality/Provider - Dr. Schaffer    Does the patient have enough for 3 days?   [x] Yes   [] No - Send as HP to POD

## 2025-02-06 RX ORDER — METOPROLOL SUCCINATE 25 MG/1
25 TABLET, EXTENDED RELEASE ORAL DAILY
Qty: 90 TABLET | Refills: 1 | Status: SHIPPED | OUTPATIENT
Start: 2025-02-06

## 2025-02-06 RX ORDER — DILTIAZEM HYDROCHLORIDE 180 MG/1
180 CAPSULE, COATED, EXTENDED RELEASE ORAL DAILY
Qty: 90 CAPSULE | Refills: 1 | Status: SHIPPED | OUTPATIENT
Start: 2025-02-06

## 2025-02-11 NOTE — TELEPHONE ENCOUNTER
Spoke with pt's daughter and she stated that she will decide and let us know which of the two meds she will go with.

## 2025-02-20 ENCOUNTER — TELEPHONE (OUTPATIENT)
Dept: CARDIAC SURGERY | Facility: CLINIC | Age: 80
End: 2025-02-20

## 2025-02-20 ENCOUNTER — RA CDI HCC (OUTPATIENT)
Dept: OTHER | Facility: HOSPITAL | Age: 80
End: 2025-02-20

## 2025-02-20 NOTE — TELEPHONE ENCOUNTER
Spoke with patient's daughter Piedad for an update on dental clearance. Daughter said that she is waiting to hear from the Oral surgeon's office in regards to what had happened a few days ago with the appt. Asked that she call me back once she has an update.     PLEASE TRANSFER DAUGHTER TO THE OFFICE.

## 2025-02-20 NOTE — PROGRESS NOTES
HCC coding opportunities          Chart Reviewed number of suggestions sent to Provider: 1     I13.0    Patients Insurance     Medicare Insurance: [employer] Advantage

## 2025-06-25 ENCOUNTER — OFFICE VISIT (OUTPATIENT)
Dept: CARDIAC SURGERY | Facility: CLINIC | Age: 80
End: 2025-06-25
Payer: COMMERCIAL

## 2025-06-25 VITALS
HEART RATE: 59 BPM | DIASTOLIC BLOOD PRESSURE: 73 MMHG | BODY MASS INDEX: 23.37 KG/M2 | WEIGHT: 127 LBS | OXYGEN SATURATION: 98 % | HEIGHT: 62 IN | SYSTOLIC BLOOD PRESSURE: 170 MMHG

## 2025-06-25 DIAGNOSIS — I48.0 PAROXYSMAL ATRIAL FIBRILLATION (HCC): ICD-10-CM

## 2025-06-25 DIAGNOSIS — Z01.812 ENCOUNTER FOR PRE-OPERATIVE LABORATORY TESTING: ICD-10-CM

## 2025-06-25 DIAGNOSIS — I35.0 SEVERE AORTIC STENOSIS: Primary | ICD-10-CM

## 2025-06-25 DIAGNOSIS — I65.22 CAROTID STENOSIS, ASYMPTOMATIC, LEFT: ICD-10-CM

## 2025-06-25 DIAGNOSIS — Z01.810 PRE-OPERATIVE CARDIOVASCULAR EXAMINATION: ICD-10-CM

## 2025-06-25 PROCEDURE — 99214 OFFICE O/P EST MOD 30 MIN: CPT | Performed by: THORACIC SURGERY (CARDIOTHORACIC VASCULAR SURGERY)

## 2025-06-25 RX ORDER — CEFAZOLIN SODIUM 2 G/50ML
2000 SOLUTION INTRAVENOUS ONCE
OUTPATIENT
Start: 2025-06-25 | End: 2025-06-25

## 2025-06-25 RX ORDER — MUPIROCIN 2 %
OINTMENT (GRAM) TOPICAL 2 TIMES DAILY
Qty: 22 G | Refills: 0 | Status: SHIPPED | OUTPATIENT
Start: 2025-06-25

## 2025-06-25 RX ORDER — CHLORHEXIDINE GLUCONATE ORAL RINSE 1.2 MG/ML
15 SOLUTION DENTAL ONCE
OUTPATIENT
Start: 2025-06-25 | End: 2025-06-25

## 2025-06-25 NOTE — LETTER
June 25, 2025     Darrel Mathew PA-C  [id number]  [facility] [address]    Patient: Glendy Pete   YOB: 1945   Date of Visit: 6/25/2025       Dear SAMY Friend MD:    Thank you for referring Glendy Pete to me for evaluation. Below are my notes for this consultation.    If you have questions, please do not hesitate to call me. I look forward to following your patient along with you.         Sincerely,        Herbert Del Cid MD        CC: MD Mae Ying PA-C  6/25/2025 12:22 PM  Attested  Consultation - Cardiac Surgery   Glendy Pete 79 y.o. female MRN: [id number]      Reason for Consult / Principal Problem: Aortic stenosis, Non-Rheumatic    History of Present Illness: Glendy Pete is a 79 y.o. year old female who was previously evaluated in our office by Herbert Del Cid M.D. for transcatheter aortic valve replacement.  During this initial consultation, arrangements were made for the following studies to be completed: gated CTA of the chest, abdomen, and pelvis, cardiac catheterization, dental clearance, and carotid ultrasound.     Glendy Pete now presents to review the results of these tests and confirm the suitability of proceeding with transcatheter aortic valve replacement.    In review, patient has a PMHx AS, HTN, PAF (Eliquis), asthma, LICA stenosis (asymptomatic, follows with Dr. Yañez), CKD3, hypothyroidism, colon cancer s/p resection and chemotherapy, h/o melanoma, syncope (s/p PPM). She previously lived in [place], but relocated to [place] about 2 years ago. She was followed by cardiology in [place] for her AS. 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 She was previously on Flecainide and is now on Cardizem. After she relocated to the area, she established care with cardiology and vascular surgery. She was found to have the left ICA stenosis, and was recommended to have  intervention, but her aortic stenosis precluded this from occurring. She was initially seen in our office in August 2024, and was sent for pre-TAVR testing. Her dental clearance took a long time to obtain, which is why she returns almost a year after her first visit.     Patient is accompanied today by her daughter. She denies symptoms of chest pain, SOB, HERNANDEZ, LE edema, palpitations, further syncopal events. She lives independently, and drives. She is a retired . She is a lifelong non-smoker, does not drink alcohol or use recreational drugs. She does not use an assist device for ambulation.      Past Medical History:  Past Medical History:   Diagnosis Date   • Asthma    • Carotid artery stenosis     left asymptomatic 75-80%, right asymptomatic 40-45%   • Carotid stenosis, asymptomatic, left     70%   • CKD (chronic kidney disease), stage III (HCC)     baseline Cr 0.8-1.0   • History of colon cancer     s/p resection & chemotherapy   • History of melanoma excision    • History of nonadherence to medical treatment    • History of syncope     s/p SJM PPM   • [place] (hard of hearing)     no aids   • Hypertension    • Hypothyroidism    • PAF (paroxysmal atrial fibrillation) (HCC)     prior Flecanide, Cardizem, Eliquis   • Severe aortic stenosis          Past Surgical History:   Past Surgical History:   Procedure Laterality Date   • CARDIAC CATHETERIZATION N/A 8/23/2024    Procedure: Cardiac Coronary Angiogram;  Surgeon: Olga Lidia Handley DO;  Location: [facility];  Service: Cardiology   • CARDIAC CATHETERIZATION  8/23/2024    Procedure: Cardiac catheterization;  Surgeon: Olga Lidia Handley DO;  Location: [facility];  Service: Cardiology   • CARDIAC PACEMAKER PLACEMENT  2020    [facility]   • COLON SURGERY  1992    colon resection with chemo   • CRANIOTOMY Right 1989    R bleed-from fall and hit head   • SKIN CANCER EXCISION Right     Melanoma R arm         Family History:  Family  History   Problem Relation Name Age of Onset   • Heart disease Mother     • COPD Sister     • Emphysema Sister     • Colon cancer Brother     • Liver cancer Paternal Grandfather           Social History:    Social History     Substance and Sexual Activity   Alcohol Use Not Currently     Social History     Substance and Sexual Activity   Drug Use Never     Tobacco Use History[1]    Home Medications:   Prior to Admission medications    Medication Sig Start Date End Date Taking? Authorizing Provider   albuterol (PROVENTIL HFA,VENTOLIN HFA) 90 mcg/act inhaler Inhale 2 puffs every 6 (six) hours as needed for wheezing 1/30/25  Yes Darrel Mathew PA-C   apixaban (ELIQUIS) 5 mg Take 1 tablet (5 mg total) by mouth 2 (two) times a day 5/4/24  Yes Darrel Mathew PA-C   atorvastatin (LIPITOR) 20 mg tablet Take 1 tablet (20 mg total) by mouth daily 2/5/25  Yes Darrel Mathew PA-C   diltiazem (CARDIZEM CD) 180 mg 24 hr capsule Take 1 capsule (180 mg total) by mouth daily 2/6/25  Yes FLORENCIO Machado   metoprolol succinate (TOPROL-XL) 25 mg 24 hr tablet Take 1 tablet (25 mg total) by mouth daily 2/6/25  Yes FLORENCIO Machado   Fluticasone Furoate-Vilanterol 100-25 mcg/actuation inhaler Inhale 1 puff daily Rinse mouth after use.  Patient not taking: Reported on 9/13/2024    Historical Provider, MD       Allergies:  Allergies   Allergen Reactions   • Codeine Nausea Only       Review of Systems:     Review of Systems   Constitutional:  Negative for chills and fever.   HENT:  Negative for ear pain and sore throat.    Eyes:  Negative for pain and visual disturbance.   Respiratory:  Negative for cough and shortness of breath.    Cardiovascular:  Negative for chest pain and palpitations.   Gastrointestinal:  Negative for abdominal pain and vomiting.   Genitourinary:  Negative for dysuria and hematuria.   Musculoskeletal:  Negative for arthralgias and back pain.   Skin:  Negative for color change and rash.   Neurological:  Negative  "for seizures and syncope.   All other systems reviewed and are negative.      Vital Signs:     Vitals:    06/25/25 1041 06/25/25 1049   BP: (!) 174/72 170/73   BP Location: Left arm Right arm   Patient Position: Sitting Sitting   Cuff Size: Standard Standard   Pulse: 59    SpO2: 98%    Weight: 57.6 kg (127 lb)    Height: 5' 2\" (1.575 m)        Physical Exam:     Physical Exam  Vitals reviewed.   Constitutional:       Appearance: Normal appearance.   HENT:      Head: Normocephalic and atraumatic.     Eyes:      Pupils: Pupils are equal, round, and reactive to light.     Neck:      Vascular: No carotid bruit or JVD.     Cardiovascular:      Rate and Rhythm: Normal rate and regular rhythm.      Pulses:           Carotid pulses are 2+ on the right side and 2+ on the left side.       Radial pulses are 2+ on the right side and 2+ on the left side.        Dorsalis pedis pulses are 2+ on the right side and 2+ on the left side.      Heart sounds: Murmur heard.      Systolic murmur is present with a grade of 3/6.      No friction rub. No gallop.   Pulmonary:      Effort: Pulmonary effort is normal.      Breath sounds: Normal breath sounds. No wheezing, rhonchi or rales.   Abdominal:      Palpations: Abdomen is soft.      Tenderness: There is no abdominal tenderness.     Musculoskeletal:      Cervical back: Normal range of motion.     Skin:     General: Skin is warm and dry.      Capillary Refill: Capillary refill takes less than 2 seconds.     Neurological:      General: No focal deficit present.      Mental Status: She is alert.      Sensory: Sensation is intact.     Psychiatric:         Attention and Perception: Attention normal.         Mood and Affect: Mood normal.         Behavior: Behavior normal. Behavior is cooperative.         Lab Results:               Invalid input(s): \"LABGLOM\"      No results found for: \"HGBA1C\"  No results found for: \"CKTOTAL\", \"CKMB\", \"CKMBINDEX\", \"TROPONINI\"    Imaging Studies: "     Echocardiogram: 7/23/25  Left Ventricle Left ventricular cavity size is normal. Wall thickness is mildly increased. There is mild concentric hypertrophy. The left ventricular ejection fraction is 60%. Systolic function is normal. Wall motion is normal. Diastolic function is normal.   Right Ventricle Right ventricular cavity size is normal. Systolic function is normal. Wall thickness is normal.   Left Atrium The atrium is normal in size.   Right Atrium The atrium is normal in size. A pacer wire is present.   Aortic Valve The aortic valve was not well visualized. The leaflets are mildly thickened. The leaflets are mildly calcified. There is severely reduced mobility. There is no evidence of regurgitation. There is severe stenosis.  Peak velocity was 4.1 m/s.  Peak and mean gradients were 67 and 47 mm mmHg respectively.   Mitral Valve There is mild annular calcification.  There is no evidence of regurgitation. There is mild stenosis.  Mean gradient was 3.25 mmHg.   Tricuspid Valve Tricuspid valve structure is normal. There is mild regurgitation. There is no evidence of stenosis. The right ventricular systolic pressure is mildly elevated. The estimated right ventricular systolic pressure is 44.00 mmHg.   Pulmonic Valve Pulmonic valve structure is normal. There is no evidence of regurgitation. There is no evidence of stenosis.   Ascending Aorta The aortic root is normal in size.   IVC/SVC The right atrial pressure is estimated at 3.0 mmHg. The inferior vena cava is normal in size.   Pericardium There is no pericardial effusion. The pericardium is normal in appearance.       Gated CTA of the chest/abdomen/pelvis: 8/14/24  IMPRESSION:     Measurements and analysis to allow for planning of Transcatheter Aortic Valve Repair as above.     70% proximal SMA stenosis spanning several centimeters. No significant celiac stenosis. 50% proximal left renal artery stenosis.    Cardiac catheterization: 8/23/24  No angiographic  evidence of significant obstructive CAD     CTA Head and Neck: 7/12/24  FINDINGS:  NONCONTRAST BRAIN  PARENCHYMA: Postsurgical sequelae related to prior right frontal craniotomy. Encephalomalacia/gliosis in the inferior frontal lobes, right greater than left, is again seen. Patchy hypoattenuation in the gangliocapsular regions, when correlated with brain   MRI, represents prominent perivascular spaces.     No acute intracranial hemorrhage, new mass effect or midline shift.     Mild chronic ischemic changes of the white matter. Intracranial vascular calcifications.     VENTRICLES AND EXTRA-AXIAL SPACES:Normal for the patient's age.     VISUALIZED ORBITS: Chronic appearing bilateral medial orbital wall fractures.     PARANASAL SINUSES: Mild mucosal thickening.     CTA NECK     ARCH AND GREAT VESSELS: Atherosclerosis.  VERTEBRAL ARTERIES: Patent extracranial segments.  RIGHT CAROTID: Approximately 40 to 45% stenosis of the right ICA at the carotid bifurcation, due to calcified and noncalcified plaque.  LEFT CAROTID: Approximately 75 to 80% stenosis of the left ICA at the carotid bifurcation, due to calcified and noncalcified plaque.  NASCET criteria was used to determine the degree of internal carotid artery diameter stenosis.        CTA BRAIN:  INTERNAL CAROTID ARTERIES: Atherosclerosis with up to moderate multifocal stenosis of the internal carotid arteries particularly in the cavernous portions. 2 mm inferiorly directed right ICA terminus outpouching (series 4, image 90).  ANTERIOR CEREBRAL ARTERY CIRCULATION:  No significant stenosis or occlusion.  MIDDLE CEREBRAL ARTERY CIRCULATION:  No significant stenosis or occlusion.  DISTAL VERTEBRAL ARTERIES:  No significant stenosis or occlusion.  BASILAR ARTERY:  No significant stenosis or occlusion.  POSTERIOR CEREBRAL ARTERIES: No significant stenosis or occlusion.  VENOUS STRUCTURES: No acute abnormality, noting this examination is not tailored for assessment.     NON  VASCULAR ANATOMY  BONY STRUCTURES:  No acute fracture.     SOFT TISSUES OF THE NECK: Indeterminate 1 cm hypoattenuating structure in the left supraclavicular fossa (series 4, image 206).     THORACIC INLET: Partially visualized, partially calcified right breast implant.        IMPRESSION:     1. No acute intracranial hemorrhage or mass effect.     2. Approximately 75 to 80% stenosis of the left ICA at the carotid bifurcation, due to calcified and noncalcified plaque. Approximately 40 to 45% stenosis of the right ICA at the carotid bifurcation, due to calcified and noncalcified plaque.     3. No proximal large vessel occlusion in the head and neck.     4. There is a 2 mm inferiorly directed outpouching of the right ICA terminus, which may represent a small aneurysm.     5. Indeterminate 1 cm hypoattenuating structure in the left supraclavicular fossa, which could represent a varix of the unopacified left brachiocephalic vein versus a lymph node. This could be further evaluated with neck CT (accounting for venous phase   of contrast), as clinically appropriate.     The study was marked in EPIC for significant notification.    Carotid artery ultrasound: 5/2/24  CONCLUSION:  Impression  RIGHT:  There is <50% stenosis noted in the internal carotid artery. Plaque is  heterogenous and irregular.  Vertebral artery flow is antegrade. There is no significant subclavian artery  disease.     LEFT:  There is >70% stenosis noted in the internal carotid artery. Plaque is  heterogenous and irregular.  Vertebral artery flow is antegrade. There is no significant subclavian artery  disease.     Compared to previous study on 1/29/2024, there is an increase in the right  proximal ICA velocity and ratio.  Recommend repeat testing in 6 months as per protocol unless otherwise  indicated.    Results Review Statement: I personally reviewed the following image studies in PACS and associated radiology reports: CTA C/A/P, CTA Head and Neck,  carotids, cath, echo. My interpretation of the radiology images/reports is: agree with above interpretation.    TAVR evaluation Assessment:     5 Meter Walk Test:      Attempt 1: 6   Attempt 2: 6   Attempt 3: 7    STS Risk Score: 5.13 %, mortality risk    Aortic Stenosis Stage: D1    [place]: III    KCCQ-12 was completed    Assessment:  Patient Active Problem List    Diagnosis Date Noted   • Stage 3a chronic kidney disease (HCC) 02/29/2024   • Severe aortic stenosis 02/29/2024   • Paroxysmal atrial fibrillation (HCC) 02/29/2024   • Carotid stenosis, asymptomatic, left 02/19/2024   • Hypothyroid 04/07/2023   • Primary hypertension 04/07/2023   • Mild intermittent asthma without complication 04/07/2023   • Pacemaker 04/07/2023   • Hearing impairment 04/07/2023   • History of colon cancer 04/07/2023   • History of melanoma 01/14/2013     Impression/Plan:    Glendy Pete has symptomatic severe aortic stenosis. They have undergone testing for transcatheter aortic valve replacement.  The results of these studies have been interpreted by the multidisciplinary TAVR team who have determined the patient to be Intermediate risk for open aortic valve replacement based on other pre-existing comobidities not reflected in the STS risk score.     The risks, benefits, and alternatives to TAVR were discussed in detail with the patient today. They understand and wish to proceed with transcatheter tricuspid valve replacement.  Informed consent was obtained and a date for the intervention has been set.    Pre-op instructions reviewed with patient and they were instructed to hold Eliquis 5 days before surgery.     Glendy Pete was comfortable with our recommendations, and their questions were answered to their satisfaction.       SIGNATURE: Mae Escalera PA-C  DATE: June 25, 2025  TIME: 10:58 AM       [1]   Social History  Tobacco Use   Smoking Status Never   Smokeless Tobacco Never     Attestation signed by Herbert Del Cid MD at  6/25/2025 12:23 PM:  Attending Attestation:    I supervised the Advanced Practitioner.? In addition to personally performing portions of the history and physical exam, I performed, in its entirety, the assessment/plan/medical decision making/counseling/care coordination component of the visit.  I reviewed the Advanced Practitioner's note,  medications prescribed and orders placed.    Medical decision making is detailed below:    The patient is a very pleasant 79-year-old female with symptomatic severe aortic stenosis, I evaluated her and recommended TAVR.  She has completed preoperative testing and return to schedule surgery.  I reviewed the diagnosis once again with the patient and her sister, I explained the procedure, benefits, risk and possible complications.  She understands and agrees to proceed with surgery.  She will return for an elective TF TAVR.    Herbert Del Cid MD  06/25/25  12:23 PM

## 2025-06-25 NOTE — PROGRESS NOTES
Consultation - Cardiac Surgery   Glendy Pete 79 y.o. female MRN: [id number]      Reason for Consult / Principal Problem: Aortic stenosis, Non-Rheumatic    History of Present Illness: Glendy Pete is a 79 y.o. year old female who was previously evaluated in our office by Herbert Del Cid M.D. for transcatheter aortic valve replacement.  During this initial consultation, arrangements were made for the following studies to be completed: gated CTA of the chest, abdomen, and pelvis, cardiac catheterization, dental clearance, and carotid ultrasound.     Glendy Pete now presents to review the results of these tests and confirm the suitability of proceeding with transcatheter aortic valve replacement.    In review, patient has a PMHx AS, HTN, PAF (Eliquis), asthma, LICA stenosis (asymptomatic, follows with Dr. Yañez), CKD3, hypothyroidism, colon cancer s/p resection and chemotherapy, h/o melanoma, syncope (s/p PPM). She previously lived in [place], but relocated to [place] about 2 years ago. She was followed by cardiology in [place] for her AS. 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 She was previously on Flecainide and is now on Cardizem. After she relocated to the area, she established care with cardiology and vascular surgery. She was found to have the left ICA stenosis, and was recommended to have intervention, but her aortic stenosis precluded this from occurring. She was initially seen in our office in August 2024, and was sent for pre-TAVR testing. Her dental clearance took a long time to obtain, which is why she returns almost a year after her first visit.     Patient is accompanied today by her daughter. She denies symptoms of chest pain, SOB, HERNANDEZ, LE edema, palpitations, further syncopal events. She lives independently, and drives. She is a retired . She is a lifelong non-smoker, does not drink alcohol or use recreational  drugs. She does not use an assist device for ambulation.      Past Medical History:  Past Medical History:   Diagnosis Date    Asthma     Carotid artery stenosis     left asymptomatic 75-80%, right asymptomatic 40-45%    Carotid stenosis, asymptomatic, left     70%    CKD (chronic kidney disease), stage III (HCC)     baseline Cr 0.8-1.0    History of colon cancer     s/p resection & chemotherapy    History of melanoma excision     History of nonadherence to medical treatment     History of syncope     s/p SJM PPM    [place] (hard of hearing)     no aids    Hypertension     Hypothyroidism     PAF (paroxysmal atrial fibrillation) (HCC)     prior Flecanide, Cardizem, Eliquis    Severe aortic stenosis          Past Surgical History:   Past Surgical History:   Procedure Laterality Date    CARDIAC CATHETERIZATION N/A 8/23/2024    Procedure: Cardiac Coronary Angiogram;  Surgeon: Olga Lidia Handley DO;  Location: [facility];  Service: Cardiology    CARDIAC CATHETERIZATION  8/23/2024    Procedure: Cardiac catheterization;  Surgeon: Olga Lidia Handley DO;  Location: [facility];  Service: Cardiology    CARDIAC PACEMAKER PLACEMENT  2020    [facility]    COLON SURGERY  1992    colon resection with chemo    CRANIOTOMY Right 1989    R bleed-from fall and hit head    SKIN CANCER EXCISION Right     Melanoma R arm         Family History:  Family History   Problem Relation Name Age of Onset    Heart disease Mother      COPD Sister      Emphysema Sister      Colon cancer Brother      Liver cancer Paternal Grandfather           Social History:    Social History     Substance and Sexual Activity   Alcohol Use Not Currently     Social History     Substance and Sexual Activity   Drug Use Never     Tobacco Use History[1]    Home Medications:   Prior to Admission medications    Medication Sig Start Date End Date Taking? Authorizing Provider   albuterol (PROVENTIL HFA,VENTOLIN HFA) 90 mcg/act inhaler Inhale 2 puffs every 6  "(six) hours as needed for wheezing 1/30/25  Yes Darrel Mathew PA-C   apixaban (ELIQUIS) 5 mg Take 1 tablet (5 mg total) by mouth 2 (two) times a day 5/4/24  Yes Darrel Mathew PA-C   atorvastatin (LIPITOR) 20 mg tablet Take 1 tablet (20 mg total) by mouth daily 2/5/25  Yes Darrel Mathew PA-C   diltiazem (CARDIZEM CD) 180 mg 24 hr capsule Take 1 capsule (180 mg total) by mouth daily 2/6/25  Yes FLORENCIO Machado   metoprolol succinate (TOPROL-XL) 25 mg 24 hr tablet Take 1 tablet (25 mg total) by mouth daily 2/6/25  Yes FLORENCIO Machado   Fluticasone Furoate-Vilanterol 100-25 mcg/actuation inhaler Inhale 1 puff daily Rinse mouth after use.  Patient not taking: Reported on 9/13/2024    Historical Provider, MD       Allergies:  Allergies   Allergen Reactions    Codeine Nausea Only       Review of Systems:     Review of Systems   Constitutional:  Negative for chills and fever.   HENT:  Negative for ear pain and sore throat.    Eyes:  Negative for pain and visual disturbance.   Respiratory:  Negative for cough and shortness of breath.    Cardiovascular:  Negative for chest pain and palpitations.   Gastrointestinal:  Negative for abdominal pain and vomiting.   Genitourinary:  Negative for dysuria and hematuria.   Musculoskeletal:  Negative for arthralgias and back pain.   Skin:  Negative for color change and rash.   Neurological:  Negative for seizures and syncope.   All other systems reviewed and are negative.      Vital Signs:     Vitals:    06/25/25 1041 06/25/25 1049   BP: (!) 174/72 170/73   BP Location: Left arm Right arm   Patient Position: Sitting Sitting   Cuff Size: Standard Standard   Pulse: 59    SpO2: 98%    Weight: 57.6 kg (127 lb)    Height: 5' 2\" (1.575 m)        Physical Exam:     Physical Exam  Vitals reviewed.   Constitutional:       Appearance: Normal appearance.   HENT:      Head: Normocephalic and atraumatic.     Eyes:      Pupils: Pupils are equal, round, and reactive to light.     Neck:      " "Vascular: No carotid bruit or JVD.     Cardiovascular:      Rate and Rhythm: Normal rate and regular rhythm.      Pulses:           Carotid pulses are 2+ on the right side and 2+ on the left side.       Radial pulses are 2+ on the right side and 2+ on the left side.        Dorsalis pedis pulses are 2+ on the right side and 2+ on the left side.      Heart sounds: Murmur heard.      Systolic murmur is present with a grade of 3/6.      No friction rub. No gallop.   Pulmonary:      Effort: Pulmonary effort is normal.      Breath sounds: Normal breath sounds. No wheezing, rhonchi or rales.   Abdominal:      Palpations: Abdomen is soft.      Tenderness: There is no abdominal tenderness.     Musculoskeletal:      Cervical back: Normal range of motion.     Skin:     General: Skin is warm and dry.      Capillary Refill: Capillary refill takes less than 2 seconds.     Neurological:      General: No focal deficit present.      Mental Status: She is alert.      Sensory: Sensation is intact.     Psychiatric:         Attention and Perception: Attention normal.         Mood and Affect: Mood normal.         Behavior: Behavior normal. Behavior is cooperative.         Lab Results:               Invalid input(s): \"LABGLOM\"      No results found for: \"HGBA1C\"  No results found for: \"CKTOTAL\", \"CKMB\", \"CKMBINDEX\", \"TROPONINI\"    Imaging Studies:     Echocardiogram: 7/23/25  Left Ventricle Left ventricular cavity size is normal. Wall thickness is mildly increased. There is mild concentric hypertrophy. The left ventricular ejection fraction is 60%. Systolic function is normal. Wall motion is normal. Diastolic function is normal.   Right Ventricle Right ventricular cavity size is normal. Systolic function is normal. Wall thickness is normal.   Left Atrium The atrium is normal in size.   Right Atrium The atrium is normal in size. A pacer wire is present.   Aortic Valve The aortic valve was not well visualized. The leaflets are mildly " thickened. The leaflets are mildly calcified. There is severely reduced mobility. There is no evidence of regurgitation. There is severe stenosis.  Peak velocity was 4.1 m/s.  Peak and mean gradients were 67 and 47 mm mmHg respectively.   Mitral Valve There is mild annular calcification.  There is no evidence of regurgitation. There is mild stenosis.  Mean gradient was 3.25 mmHg.   Tricuspid Valve Tricuspid valve structure is normal. There is mild regurgitation. There is no evidence of stenosis. The right ventricular systolic pressure is mildly elevated. The estimated right ventricular systolic pressure is 44.00 mmHg.   Pulmonic Valve Pulmonic valve structure is normal. There is no evidence of regurgitation. There is no evidence of stenosis.   Ascending Aorta The aortic root is normal in size.   IVC/SVC The right atrial pressure is estimated at 3.0 mmHg. The inferior vena cava is normal in size.   Pericardium There is no pericardial effusion. The pericardium is normal in appearance.       Gated CTA of the chest/abdomen/pelvis: 8/14/24  IMPRESSION:     Measurements and analysis to allow for planning of Transcatheter Aortic Valve Repair as above.     70% proximal SMA stenosis spanning several centimeters. No significant celiac stenosis. 50% proximal left renal artery stenosis.    Cardiac catheterization: 8/23/24  No angiographic evidence of significant obstructive CAD     CTA Head and Neck: 7/12/24  FINDINGS:  NONCONTRAST BRAIN  PARENCHYMA: Postsurgical sequelae related to prior right frontal craniotomy. Encephalomalacia/gliosis in the inferior frontal lobes, right greater than left, is again seen. Patchy hypoattenuation in the gangliocapsular regions, when correlated with brain   MRI, represents prominent perivascular spaces.     No acute intracranial hemorrhage, new mass effect or midline shift.     Mild chronic ischemic changes of the white matter. Intracranial vascular calcifications.     VENTRICLES AND  EXTRA-AXIAL SPACES:Normal for the patient's age.     VISUALIZED ORBITS: Chronic appearing bilateral medial orbital wall fractures.     PARANASAL SINUSES: Mild mucosal thickening.     CTA NECK     ARCH AND GREAT VESSELS: Atherosclerosis.  VERTEBRAL ARTERIES: Patent extracranial segments.  RIGHT CAROTID: Approximately 40 to 45% stenosis of the right ICA at the carotid bifurcation, due to calcified and noncalcified plaque.  LEFT CAROTID: Approximately 75 to 80% stenosis of the left ICA at the carotid bifurcation, due to calcified and noncalcified plaque.  NASCET criteria was used to determine the degree of internal carotid artery diameter stenosis.        CTA BRAIN:  INTERNAL CAROTID ARTERIES: Atherosclerosis with up to moderate multifocal stenosis of the internal carotid arteries particularly in the cavernous portions. 2 mm inferiorly directed right ICA terminus outpouching (series 4, image 90).  ANTERIOR CEREBRAL ARTERY CIRCULATION:  No significant stenosis or occlusion.  MIDDLE CEREBRAL ARTERY CIRCULATION:  No significant stenosis or occlusion.  DISTAL VERTEBRAL ARTERIES:  No significant stenosis or occlusion.  BASILAR ARTERY:  No significant stenosis or occlusion.  POSTERIOR CEREBRAL ARTERIES: No significant stenosis or occlusion.  VENOUS STRUCTURES: No acute abnormality, noting this examination is not tailored for assessment.     NON VASCULAR ANATOMY  BONY STRUCTURES:  No acute fracture.     SOFT TISSUES OF THE NECK: Indeterminate 1 cm hypoattenuating structure in the left supraclavicular fossa (series 4, image 206).     THORACIC INLET: Partially visualized, partially calcified right breast implant.        IMPRESSION:     1. No acute intracranial hemorrhage or mass effect.     2. Approximately 75 to 80% stenosis of the left ICA at the carotid bifurcation, due to calcified and noncalcified plaque. Approximately 40 to 45% stenosis of the right ICA at the carotid bifurcation, due to calcified and noncalcified  plaque.     3. No proximal large vessel occlusion in the head and neck.     4. There is a 2 mm inferiorly directed outpouching of the right ICA terminus, which may represent a small aneurysm.     5. Indeterminate 1 cm hypoattenuating structure in the left supraclavicular fossa, which could represent a varix of the unopacified left brachiocephalic vein versus a lymph node. This could be further evaluated with neck CT (accounting for venous phase   of contrast), as clinically appropriate.     The study was marked in EPIC for significant notification.    Carotid artery ultrasound: 5/2/24  CONCLUSION:  Impression  RIGHT:  There is <50% stenosis noted in the internal carotid artery. Plaque is  heterogenous and irregular.  Vertebral artery flow is antegrade. There is no significant subclavian artery  disease.     LEFT:  There is >70% stenosis noted in the internal carotid artery. Plaque is  heterogenous and irregular.  Vertebral artery flow is antegrade. There is no significant subclavian artery  disease.     Compared to previous study on 1/29/2024, there is an increase in the right  proximal ICA velocity and ratio.  Recommend repeat testing in 6 months as per protocol unless otherwise  indicated.    Results Review Statement: I personally reviewed the following image studies in PACS and associated radiology reports: CTA C/A/P, CTA Head and Neck, carotids, cath, echo. My interpretation of the radiology images/reports is: agree with above interpretation.    TAVR evaluation Assessment:     5 Meter Walk Test:      Attempt 1: 6   Attempt 2: 6   Attempt 3: 7    STS Risk Score: 5.13 %, mortality risk    Aortic Stenosis Stage: D1    [facility]: III    KCCQ-12 was completed    Assessment:  Patient Active Problem List    Diagnosis Date Noted    Stage 3a chronic kidney disease (HCC) 02/29/2024    Severe aortic stenosis 02/29/2024    Paroxysmal atrial fibrillation (HCC) 02/29/2024    Carotid stenosis, asymptomatic, left 02/19/2024     Hypothyroid 04/07/2023    Primary hypertension 04/07/2023    Mild intermittent asthma without complication 04/07/2023    Pacemaker 04/07/2023    Hearing impairment 04/07/2023    History of colon cancer 04/07/2023    History of melanoma 01/14/2013     Impression/Plan:    Glendy Pete has symptomatic severe aortic stenosis. They have undergone testing for transcatheter aortic valve replacement.  The results of these studies have been interpreted by the multidisciplinary TAVR team who have determined the patient to be Intermediate risk for open aortic valve replacement based on other pre-existing comobidities not reflected in the STS risk score.     The risks, benefits, and alternatives to TAVR were discussed in detail with the patient today. They understand and wish to proceed with transcatheter tricuspid valve replacement.  Informed consent was obtained and a date for the intervention has been set.    Pre-op instructions reviewed with patient and they were instructed to hold Eliquis 5 days before surgery.     Glendy Pete was comfortable with our recommendations, and their questions were answered to their satisfaction.       SIGNATURE: Mae Escalera PA-C  DATE: June 25, 2025  TIME: 10:58 AM       [1]   Social History  Tobacco Use   Smoking Status Never   Smokeless Tobacco Never

## 2025-06-25 NOTE — H&P (VIEW-ONLY)
Preop History and Physical- Cardiac Surgery   Glendy Pete 79 y.o. female MRN: [id number]      Reason for Consult / Principal Problem: Aortic stenosis, Non-Rheumatic    History of Present Illness: Glendy Pete is a 79 y.o. year old female who was previously evaluated in our office by Herbert Del Cid M.D. for transcatheter aortic valve replacement.  During this initial consultation, arrangements were made for the following studies to be completed: gated CTA of the chest, abdomen, and pelvis, cardiac catheterization, dental clearance, and carotid ultrasound.     Glendy Pete now presents to review the results of these tests and confirm the suitability of proceeding with transcatheter aortic valve replacement.    In review, patient has a PMHx AS, HTN, PAF (Eliquis), asthma, LICA stenosis (asymptomatic, follows with Dr. Yañez), CKD3, hypothyroidism, colon cancer s/p resection and chemotherapy, h/o melanoma, syncope (s/p PPM). She previously lived in [place], but relocated to [place] about 2 years ago. She was followed by cardiology in [place] for her AS. 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 She was previously on Flecainide and is now on Cardizem. After she relocated to the area, she established care with cardiology and vascular surgery. She was found to have the left ICA stenosis, and was recommended to have intervention, but her aortic stenosis precluded this from occurring. She was initially seen in our office in August 2024, and was sent for pre-TAVR testing. Her dental clearance took a long time to obtain, which is why she returns almost a year after her first visit.     Patient is accompanied today by her daughter. She denies symptoms of chest pain, SOB, HERNANDEZ, LE edema, palpitations, further syncopal events. She lives independently, and drives. She is a retired . She is a lifelong non-smoker, does not drink alcohol or use  recreational drugs. She does not use an assist device for ambulation.      Past Medical History:  Past Medical History:   Diagnosis Date    Asthma     Carotid artery stenosis     left asymptomatic 75-80%, right asymptomatic 40-45%    Carotid stenosis, asymptomatic, left     70%    CKD (chronic kidney disease), stage III (HCC)     baseline Cr 0.8-1.0    History of colon cancer     s/p resection & chemotherapy    History of melanoma excision     History of nonadherence to medical treatment     History of syncope     s/p SJM PPM    [place] (hard of hearing)     no aids    Hypertension     Hypothyroidism     PAF (paroxysmal atrial fibrillation) (HCC)     prior Flecanide, Cardizem, Eliquis    Severe aortic stenosis          Past Surgical History:   Past Surgical History:   Procedure Laterality Date    CARDIAC CATHETERIZATION N/A 8/23/2024    Procedure: Cardiac Coronary Angiogram;  Surgeon: Olga Lidia Handley DO;  Location: [facility];  Service: Cardiology    CARDIAC CATHETERIZATION  8/23/2024    Procedure: Cardiac catheterization;  Surgeon: Olga Lidia Handley DO;  Location: [facility];  Service: Cardiology    CARDIAC PACEMAKER PLACEMENT  2020    [facility]    COLON SURGERY  1992    colon resection with chemo    CRANIOTOMY Right 1989    R bleed-from fall and hit head    SKIN CANCER EXCISION Right     Melanoma R arm         Family History:  Family History   Problem Relation Name Age of Onset    Heart disease Mother      COPD Sister      Emphysema Sister      Colon cancer Brother      Liver cancer Paternal Grandfather           Social History:    Social History     Substance and Sexual Activity   Alcohol Use Not Currently     Social History     Substance and Sexual Activity   Drug Use Never     [Tobacco Use History]    [Tobacco Use History]  Tobacco Use   Smoking Status Never   Smokeless Tobacco Never       Home Medications:   Prior to Admission medications    Medication Sig Start Date End Date Taking?  "Authorizing Provider   albuterol (PROVENTIL HFA,VENTOLIN HFA) 90 mcg/act inhaler Inhale 2 puffs every 6 (six) hours as needed for wheezing 1/30/25  Yes Darrel Mathew PA-C   apixaban (ELIQUIS) 5 mg Take 1 tablet (5 mg total) by mouth 2 (two) times a day 5/4/24  Yes Darrel Mathew PA-C   atorvastatin (LIPITOR) 20 mg tablet Take 1 tablet (20 mg total) by mouth daily 2/5/25  Yes Darrel Mathew PA-C   diltiazem (CARDIZEM CD) 180 mg 24 hr capsule Take 1 capsule (180 mg total) by mouth daily 2/6/25  Yes FLORENCIO Machado   metoprolol succinate (TOPROL-XL) 25 mg 24 hr tablet Take 1 tablet (25 mg total) by mouth daily 2/6/25  Yes FLORENCIO Machado   Fluticasone Furoate-Vilanterol 100-25 mcg/actuation inhaler Inhale 1 puff daily Rinse mouth after use.  Patient not taking: Reported on 9/13/2024    Historical Provider, MD       Allergies:  Allergies   Allergen Reactions    Codeine Nausea Only       Review of Systems:     Review of Systems   Constitutional:  Negative for chills and fever.   HENT:  Negative for ear pain and sore throat.    Eyes:  Negative for pain and visual disturbance.   Respiratory:  Negative for cough and shortness of breath.    Cardiovascular:  Negative for chest pain and palpitations.   Gastrointestinal:  Negative for abdominal pain and vomiting.   Genitourinary:  Negative for dysuria and hematuria.   Musculoskeletal:  Negative for arthralgias and back pain.   Skin:  Negative for color change and rash.   Neurological:  Negative for seizures and syncope.   All other systems reviewed and are negative.      Vital Signs:     Vitals:    06/25/25 1041 06/25/25 1049   BP: (!) 174/72 170/73   BP Location: Left arm Right arm   Patient Position: Sitting Sitting   Cuff Size: Standard Standard   Pulse: 59    SpO2: 98%    Weight: 57.6 kg (127 lb)    Height: 5' 2\" (1.575 m)        Physical Exam:     Physical Exam  Vitals reviewed.   Constitutional:       Appearance: Normal appearance.   HENT:      Head: " "Normocephalic and atraumatic.     Eyes:      Pupils: Pupils are equal, round, and reactive to light.     Neck:      Vascular: No carotid bruit or JVD.     Cardiovascular:      Rate and Rhythm: Normal rate and regular rhythm.      Pulses:           Carotid pulses are 2+ on the right side and 2+ on the left side.       Radial pulses are 2+ on the right side and 2+ on the left side.        Dorsalis pedis pulses are 2+ on the right side and 2+ on the left side.      Heart sounds: Murmur heard.      Systolic murmur is present with a grade of 3/6.      No friction rub. No gallop.   Pulmonary:      Effort: Pulmonary effort is normal.      Breath sounds: Normal breath sounds. No wheezing, rhonchi or rales.   Abdominal:      Palpations: Abdomen is soft.      Tenderness: There is no abdominal tenderness.     Musculoskeletal:      Cervical back: Normal range of motion.     Skin:     General: Skin is warm and dry.      Capillary Refill: Capillary refill takes less than 2 seconds.     Neurological:      General: No focal deficit present.      Mental Status: She is alert.      Sensory: Sensation is intact.     Psychiatric:         Attention and Perception: Attention normal.         Mood and Affect: Mood normal.         Behavior: Behavior normal. Behavior is cooperative.         Lab Results:               Invalid input(s): \"LABGLOM\"      No results found for: \"HGBA1C\"  No results found for: \"CKTOTAL\", \"CKMB\", \"CKMBINDEX\", \"TROPONINI\"    Imaging Studies:     Echocardiogram: 7/23/25  Left Ventricle Left ventricular cavity size is normal. Wall thickness is mildly increased. There is mild concentric hypertrophy. The left ventricular ejection fraction is 60%. Systolic function is normal. Wall motion is normal. Diastolic function is normal.   Right Ventricle Right ventricular cavity size is normal. Systolic function is normal. Wall thickness is normal.   Left Atrium The atrium is normal in size.   Right Atrium The atrium is normal in " size. A pacer wire is present.   Aortic Valve The aortic valve was not well visualized. The leaflets are mildly thickened. The leaflets are mildly calcified. There is severely reduced mobility. There is no evidence of regurgitation. There is severe stenosis.  Peak velocity was 4.1 m/s.  Peak and mean gradients were 67 and 47 mm mmHg respectively.   Mitral Valve There is mild annular calcification.  There is no evidence of regurgitation. There is mild stenosis.  Mean gradient was 3.25 mmHg.   Tricuspid Valve Tricuspid valve structure is normal. There is mild regurgitation. There is no evidence of stenosis. The right ventricular systolic pressure is mildly elevated. The estimated right ventricular systolic pressure is 44.00 mmHg.   Pulmonic Valve Pulmonic valve structure is normal. There is no evidence of regurgitation. There is no evidence of stenosis.   Ascending Aorta The aortic root is normal in size.   IVC/SVC The right atrial pressure is estimated at 3.0 mmHg. The inferior vena cava is normal in size.   Pericardium There is no pericardial effusion. The pericardium is normal in appearance.       Gated CTA of the chest/abdomen/pelvis: 8/14/24  IMPRESSION:     Measurements and analysis to allow for planning of Transcatheter Aortic Valve Repair as above.     70% proximal SMA stenosis spanning several centimeters. No significant celiac stenosis. 50% proximal left renal artery stenosis.    Cardiac catheterization: 8/23/24  No angiographic evidence of significant obstructive CAD     CTA Head and Neck: 7/12/24  FINDINGS:  NONCONTRAST BRAIN  PARENCHYMA: Postsurgical sequelae related to prior right frontal craniotomy. Encephalomalacia/gliosis in the inferior frontal lobes, right greater than left, is again seen. Patchy hypoattenuation in the gangliocapsular regions, when correlated with brain   MRI, represents prominent perivascular spaces.     No acute intracranial hemorrhage, new mass effect or midline shift.     Mild  chronic ischemic changes of the white matter. Intracranial vascular calcifications.     VENTRICLES AND EXTRA-AXIAL SPACES:Normal for the patient's age.     VISUALIZED ORBITS: Chronic appearing bilateral medial orbital wall fractures.     PARANASAL SINUSES: Mild mucosal thickening.     CTA NECK     ARCH AND GREAT VESSELS: Atherosclerosis.  VERTEBRAL ARTERIES: Patent extracranial segments.  RIGHT CAROTID: Approximately 40 to 45% stenosis of the right ICA at the carotid bifurcation, due to calcified and noncalcified plaque.  LEFT CAROTID: Approximately 75 to 80% stenosis of the left ICA at the carotid bifurcation, due to calcified and noncalcified plaque.  NASCET criteria was used to determine the degree of internal carotid artery diameter stenosis.        CTA BRAIN:  INTERNAL CAROTID ARTERIES: Atherosclerosis with up to moderate multifocal stenosis of the internal carotid arteries particularly in the cavernous portions. 2 mm inferiorly directed right ICA terminus outpouching (series 4, image 90).  ANTERIOR CEREBRAL ARTERY CIRCULATION:  No significant stenosis or occlusion.  MIDDLE CEREBRAL ARTERY CIRCULATION:  No significant stenosis or occlusion.  DISTAL VERTEBRAL ARTERIES:  No significant stenosis or occlusion.  BASILAR ARTERY:  No significant stenosis or occlusion.  POSTERIOR CEREBRAL ARTERIES: No significant stenosis or occlusion.  VENOUS STRUCTURES: No acute abnormality, noting this examination is not tailored for assessment.     NON VASCULAR ANATOMY  BONY STRUCTURES:  No acute fracture.     SOFT TISSUES OF THE NECK: Indeterminate 1 cm hypoattenuating structure in the left supraclavicular fossa (series 4, image 206).     THORACIC INLET: Partially visualized, partially calcified right breast implant.        IMPRESSION:     1. No acute intracranial hemorrhage or mass effect.     2. Approximately 75 to 80% stenosis of the left ICA at the carotid bifurcation, due to calcified and noncalcified plaque. Approximately 40  to 45% stenosis of the right ICA at the carotid bifurcation, due to calcified and noncalcified plaque.     3. No proximal large vessel occlusion in the head and neck.     4. There is a 2 mm inferiorly directed outpouching of the right ICA terminus, which may represent a small aneurysm.     5. Indeterminate 1 cm hypoattenuating structure in the left supraclavicular fossa, which could represent a varix of the unopacified left brachiocephalic vein versus a lymph node. This could be further evaluated with neck CT (accounting for venous phase   of contrast), as clinically appropriate.     The study was marked in EPIC for significant notification.    Carotid artery ultrasound: 5/2/24  CONCLUSION:  Impression  RIGHT:  There is <50% stenosis noted in the internal carotid artery. Plaque is  heterogenous and irregular.  Vertebral artery flow is antegrade. There is no significant subclavian artery  disease.     LEFT:  There is >70% stenosis noted in the internal carotid artery. Plaque is  heterogenous and irregular.  Vertebral artery flow is antegrade. There is no significant subclavian artery  disease.     Compared to previous study on 1/29/2024, there is an increase in the right  proximal ICA velocity and ratio.  Recommend repeat testing in 6 months as per protocol unless otherwise  indicated.    Results Review Statement: I personally reviewed the following image studies in PACS and associated radiology reports: CTA C/A/P, CTA Head and Neck, carotids, cath, echo. My interpretation of the radiology images/reports is: agree with above interpretation.    TAVR evaluation Assessment:     5 Meter Walk Test:      Attempt 1: 6   Attempt 2: 6   Attempt 3: 7    STS Risk Score: 5.13 %, mortality risk    Aortic Stenosis Stage: D1    [facility]: III    KCCQ-12 was completed    Assessment:  Patient Active Problem List    Diagnosis Date Noted    Stage 3a chronic kidney disease (HCC) 02/29/2024    Severe aortic stenosis 02/29/2024    Paroxysmal  atrial fibrillation (HCC) 02/29/2024    Carotid stenosis, asymptomatic, left 02/19/2024    Hypothyroid 04/07/2023    Primary hypertension 04/07/2023    Mild intermittent asthma without complication 04/07/2023    Pacemaker 04/07/2023    Hearing impairment 04/07/2023    History of colon cancer 04/07/2023    History of melanoma 01/14/2013     Impression/Plan:    Glendy Pete has symptomatic severe aortic stenosis. They have undergone testing for transcatheter aortic valve replacement.  The results of these studies have been interpreted by the multidisciplinary TAVR team who have determined the patient to be Intermediate risk for open aortic valve replacement based on other pre-existing comobidities not reflected in the STS risk score.     The risks, benefits, and alternatives to TAVR were discussed in detail with the patient today. They understand and wish to proceed with transcatheter tricuspid valve replacement.  Informed consent was obtained and a date for the intervention has been set.    Pre-op instructions reviewed with patient and they were instructed to hold Eliquis 5 days before surgery.     Glendy Pete was comfortable with our recommendations, and their questions were answered to their satisfaction.       SIGNATURE: Mae Escalera PA-C  DATE: June 25, 2025  TIME: 10:58 AM

## 2025-06-25 NOTE — H&P
Preop History and Physical- Cardiac Surgery   Glendy Pete 79 y.o. female MRN: [id number]      Reason for Consult / Principal Problem: Aortic stenosis, Non-Rheumatic    History of Present Illness: Glendy Pete is a 79 y.o. year old female who was previously evaluated in our office by Herbert Del Cid M.D. for transcatheter aortic valve replacement.  During this initial consultation, arrangements were made for the following studies to be completed: gated CTA of the chest, abdomen, and pelvis, cardiac catheterization, dental clearance, and carotid ultrasound.     Glendy Pete now presents to review the results of these tests and confirm the suitability of proceeding with transcatheter aortic valve replacement.    In review, patient has a PMHx AS, HTN, PAF (Eliquis), asthma, LICA stenosis (asymptomatic, follows with Dr. Yañez), CKD3, hypothyroidism, colon cancer s/p resection and chemotherapy, h/o melanoma, syncope (s/p PPM). She previously lived in [place], but relocated to [place] about 2 years ago. She was followed by cardiology in [place] for her AS. 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 She was previously on Flecainide and is now on Cardizem. After she relocated to the area, she established care with cardiology and vascular surgery. She was found to have the left ICA stenosis, and was recommended to have intervention, but her aortic stenosis precluded this from occurring. She was initially seen in our office in August 2024, and was sent for pre-TAVR testing. Her dental clearance took a long time to obtain, which is why she returns almost a year after her first visit.     Patient is accompanied today by her daughter. She denies symptoms of chest pain, SOB, HERNANDEZ, LE edema, palpitations, further syncopal events. She lives independently, and drives. She is a retired . She is a lifelong non-smoker, does not drink alcohol or use  recreational drugs. She does not use an assist device for ambulation.      Past Medical History:  Past Medical History:   Diagnosis Date    Asthma     Carotid artery stenosis     left asymptomatic 75-80%, right asymptomatic 40-45%    Carotid stenosis, asymptomatic, left     70%    CKD (chronic kidney disease), stage III (HCC)     baseline Cr 0.8-1.0    History of colon cancer     s/p resection & chemotherapy    History of melanoma excision     History of nonadherence to medical treatment     History of syncope     s/p SJM PPM    [place] (hard of hearing)     no aids    Hypertension     Hypothyroidism     PAF (paroxysmal atrial fibrillation) (HCC)     prior Flecanide, Cardizem, Eliquis    Severe aortic stenosis          Past Surgical History:   Past Surgical History:   Procedure Laterality Date    CARDIAC CATHETERIZATION N/A 8/23/2024    Procedure: Cardiac Coronary Angiogram;  Surgeon: Olga Lidia Handley DO;  [facility];  Service: Cardiology    CARDIAC CATHETERIZATION  8/23/2024    Procedure: Cardiac catheterization;  Surgeon: Olga Lidia Handley DO;  [facility];  Service: Cardiology    CARDIAC PACEMAKER PLACEMENT  2020    [facility]    COLON SURGERY  1992    colon resection with chemo    CRANIOTOMY Right 1989    R bleed-from fall and hit head    SKIN CANCER EXCISION Right     Melanoma R arm         Family History:  Family History   Problem Relation Name Age of Onset    Heart disease Mother      COPD Sister      Emphysema Sister      Colon cancer Brother      Liver cancer Paternal Grandfather           Social History:    Social History     Substance and Sexual Activity   Alcohol Use Not Currently     Social History     Substance and Sexual Activity   Drug Use Never     [Tobacco Use History]    [Tobacco Use History]  Tobacco Use   Smoking Status Never   Smokeless Tobacco Never       Home Medications:   Prior to Admission medications    Medication Sig Start Date End Date Taking?  "Authorizing Provider   albuterol (PROVENTIL HFA,VENTOLIN HFA) 90 mcg/act inhaler Inhale 2 puffs every 6 (six) hours as needed for wheezing 1/30/25  Yes Darrel Mathew PA-C   apixaban (ELIQUIS) 5 mg Take 1 tablet (5 mg total) by mouth 2 (two) times a day 5/4/24  Yes Darrel Mathew PA-C   atorvastatin (LIPITOR) 20 mg tablet Take 1 tablet (20 mg total) by mouth daily 2/5/25  Yes Darrel Mathew PA-C   diltiazem (CARDIZEM CD) 180 mg 24 hr capsule Take 1 capsule (180 mg total) by mouth daily 2/6/25  Yes FLORENCIO Machado   metoprolol succinate (TOPROL-XL) 25 mg 24 hr tablet Take 1 tablet (25 mg total) by mouth daily 2/6/25  Yes FLORENCIO Machado   Fluticasone Furoate-Vilanterol 100-25 mcg/actuation inhaler Inhale 1 puff daily Rinse mouth after use.  Patient not taking: Reported on 9/13/2024    Historical Provider, MD       Allergies:  Allergies   Allergen Reactions    Codeine Nausea Only       Review of Systems:     Review of Systems   Constitutional:  Negative for chills and fever.   HENT:  Negative for ear pain and sore throat.    Eyes:  Negative for pain and visual disturbance.   Respiratory:  Negative for cough and shortness of breath.    Cardiovascular:  Negative for chest pain and palpitations.   Gastrointestinal:  Negative for abdominal pain and vomiting.   Genitourinary:  Negative for dysuria and hematuria.   Musculoskeletal:  Negative for arthralgias and back pain.   Skin:  Negative for color change and rash.   Neurological:  Negative for seizures and syncope.   All other systems reviewed and are negative.      Vital Signs:     Vitals:    06/25/25 1041 06/25/25 1049   BP: (!) 174/72 170/73   BP Location: Left arm Right arm   Patient Position: Sitting Sitting   Cuff Size: Standard Standard   Pulse: 59    SpO2: 98%    Weight: 57.6 kg (127 lb)    Height: 5' 2\" (1.575 m)        Physical Exam:     Physical Exam  Vitals reviewed.   Constitutional:       Appearance: Normal appearance.   HENT:      Head: " "Normocephalic and atraumatic.     Eyes:      Pupils: Pupils are equal, round, and reactive to light.     Neck:      Vascular: No carotid bruit or JVD.     Cardiovascular:      Rate and Rhythm: Normal rate and regular rhythm.      Pulses:           Carotid pulses are 2+ on the right side and 2+ on the left side.       Radial pulses are 2+ on the right side and 2+ on the left side.        Dorsalis pedis pulses are 2+ on the right side and 2+ on the left side.      Heart sounds: Murmur heard.      Systolic murmur is present with a grade of 3/6.      No friction rub. No gallop.   Pulmonary:      Effort: Pulmonary effort is normal.      Breath sounds: Normal breath sounds. No wheezing, rhonchi or rales.   Abdominal:      Palpations: Abdomen is soft.      Tenderness: There is no abdominal tenderness.     Musculoskeletal:      Cervical back: Normal range of motion.     Skin:     General: Skin is warm and dry.      Capillary Refill: Capillary refill takes less than 2 seconds.     Neurological:      General: No focal deficit present.      Mental Status: She is alert.      Sensory: Sensation is intact.     Psychiatric:         Attention and Perception: Attention normal.         Mood and Affect: Mood normal.         Behavior: Behavior normal. Behavior is cooperative.         Lab Results:               Invalid input(s): \"LABGLOM\"      No results found for: \"HGBA1C\"  No results found for: \"CKTOTAL\", \"CKMB\", \"CKMBINDEX\", \"TROPONINI\"    Imaging Studies:     Echocardiogram: 7/23/25  Left Ventricle Left ventricular cavity size is normal. Wall thickness is mildly increased. There is mild concentric hypertrophy. The left ventricular ejection fraction is 60%. Systolic function is normal. Wall motion is normal. Diastolic function is normal.   Right Ventricle Right ventricular cavity size is normal. Systolic function is normal. Wall thickness is normal.   Left Atrium The atrium is normal in size.   Right Atrium The atrium is normal in " size. A pacer wire is present.   Aortic Valve The aortic valve was not well visualized. The leaflets are mildly thickened. The leaflets are mildly calcified. There is severely reduced mobility. There is no evidence of regurgitation. There is severe stenosis.  Peak velocity was 4.1 m/s.  Peak and mean gradients were 67 and 47 mm mmHg respectively.   Mitral Valve There is mild annular calcification.  There is no evidence of regurgitation. There is mild stenosis.  Mean gradient was 3.25 mmHg.   Tricuspid Valve Tricuspid valve structure is normal. There is mild regurgitation. There is no evidence of stenosis. The right ventricular systolic pressure is mildly elevated. The estimated right ventricular systolic pressure is 44.00 mmHg.   Pulmonic Valve Pulmonic valve structure is normal. There is no evidence of regurgitation. There is no evidence of stenosis.   Ascending Aorta The aortic root is normal in size.   IVC/SVC The right atrial pressure is estimated at 3.0 mmHg. The inferior vena cava is normal in size.   Pericardium There is no pericardial effusion. The pericardium is normal in appearance.       Gated CTA of the chest/abdomen/pelvis: 8/14/24  IMPRESSION:     Measurements and analysis to allow for planning of Transcatheter Aortic Valve Repair as above.     70% proximal SMA stenosis spanning several centimeters. No significant celiac stenosis. 50% proximal left renal artery stenosis.    Cardiac catheterization: 8/23/24  No angiographic evidence of significant obstructive CAD     CTA Head and Neck: 7/12/24  FINDINGS:  NONCONTRAST BRAIN  PARENCHYMA: Postsurgical sequelae related to prior right frontal craniotomy. Encephalomalacia/gliosis in the inferior frontal lobes, right greater than left, is again seen. Patchy hypoattenuation in the gangliocapsular regions, when correlated with brain   MRI, represents prominent perivascular spaces.     No acute intracranial hemorrhage, new mass effect or midline shift.     Mild  chronic ischemic changes of the white matter. Intracranial vascular calcifications.     VENTRICLES AND EXTRA-AXIAL SPACES:Normal for the patient's age.     VISUALIZED ORBITS: Chronic appearing bilateral medial orbital wall fractures.     PARANASAL SINUSES: Mild mucosal thickening.     CTA NECK     ARCH AND GREAT VESSELS: Atherosclerosis.  VERTEBRAL ARTERIES: Patent extracranial segments.  RIGHT CAROTID: Approximately 40 to 45% stenosis of the right ICA at the carotid bifurcation, due to calcified and noncalcified plaque.  LEFT CAROTID: Approximately 75 to 80% stenosis of the left ICA at the carotid bifurcation, due to calcified and noncalcified plaque.  NASCET criteria was used to determine the degree of internal carotid artery diameter stenosis.        CTA BRAIN:  INTERNAL CAROTID ARTERIES: Atherosclerosis with up to moderate multifocal stenosis of the internal carotid arteries particularly in the cavernous portions. 2 mm inferiorly directed right ICA terminus outpouching (series 4, image 90).  ANTERIOR CEREBRAL ARTERY CIRCULATION:  No significant stenosis or occlusion.  MIDDLE CEREBRAL ARTERY CIRCULATION:  No significant stenosis or occlusion.  DISTAL VERTEBRAL ARTERIES:  No significant stenosis or occlusion.  BASILAR ARTERY:  No significant stenosis or occlusion.  POSTERIOR CEREBRAL ARTERIES: No significant stenosis or occlusion.  VENOUS STRUCTURES: No acute abnormality, noting this examination is not tailored for assessment.     NON VASCULAR ANATOMY  BONY STRUCTURES:  No acute fracture.     SOFT TISSUES OF THE NECK: Indeterminate 1 cm hypoattenuating structure in the left supraclavicular fossa (series 4, image 206).     THORACIC INLET: Partially visualized, partially calcified right breast implant.        IMPRESSION:     1. No acute intracranial hemorrhage or mass effect.     2. Approximately 75 to 80% stenosis of the left ICA at the carotid bifurcation, due to calcified and noncalcified plaque. Approximately 40  to 45% stenosis of the right ICA at the carotid bifurcation, due to calcified and noncalcified plaque.     3. No proximal large vessel occlusion in the head and neck.     4. There is a 2 mm inferiorly directed outpouching of the right ICA terminus, which may represent a small aneurysm.     5. Indeterminate 1 cm hypoattenuating structure in the left supraclavicular fossa, which could represent a varix of the unopacified left brachiocephalic vein versus a lymph node. This could be further evaluated with neck CT (accounting for venous phase   of contrast), as clinically appropriate.     The study was marked in EPIC for significant notification.    Carotid artery ultrasound: 5/2/24  CONCLUSION:  Impression  RIGHT:  There is <50% stenosis noted in the internal carotid artery. Plaque is  heterogenous and irregular.  Vertebral artery flow is antegrade. There is no significant subclavian artery  disease.     LEFT:  There is >70% stenosis noted in the internal carotid artery. Plaque is  heterogenous and irregular.  Vertebral artery flow is antegrade. There is no significant subclavian artery  disease.     Compared to previous study on 1/29/2024, there is an increase in the right  proximal ICA velocity and ratio.  Recommend repeat testing in 6 months as per protocol unless otherwise  indicated.    Results Review Statement: I personally reviewed the following image studies in PACS and associated radiology reports: CTA C/A/P, CTA Head and Neck, carotids, cath, echo. My interpretation of the radiology images/reports is: agree with above interpretation.    TAVR evaluation Assessment:     5 Meter Walk Test:      Attempt 1: 6   Attempt 2: 6   Attempt 3: 7    STS Risk Score: 5.13 %, mortality risk    Aortic Stenosis Stage: D1    [facility]: III    KCCQ-12 was completed    Assessment:  Patient Active Problem List    Diagnosis Date Noted    Stage 3a chronic kidney disease (HCC) 02/29/2024    Severe aortic stenosis 02/29/2024    Paroxysmal  atrial fibrillation (HCC) 02/29/2024    Carotid stenosis, asymptomatic, left 02/19/2024    Hypothyroid 04/07/2023    Primary hypertension 04/07/2023    Mild intermittent asthma without complication 04/07/2023    Pacemaker 04/07/2023    Hearing impairment 04/07/2023    History of colon cancer 04/07/2023    History of melanoma 01/14/2013     Impression/Plan:    Glendy Pete has symptomatic severe aortic stenosis. They have undergone testing for transcatheter aortic valve replacement.  The results of these studies have been interpreted by the multidisciplinary TAVR team who have determined the patient to be Intermediate risk for open aortic valve replacement based on other pre-existing comobidities not reflected in the STS risk score.     The risks, benefits, and alternatives to TAVR were discussed in detail with the patient today. They understand and wish to proceed with transcatheter tricuspid valve replacement.  Informed consent was obtained and a date for the intervention has been set.    Pre-op instructions reviewed with patient and they were instructed to hold Eliquis 5 days before surgery.     Glendy Pete was comfortable with our recommendations, and their questions were answered to their satisfaction.       SIGNATURE: Mae Escalera PA-C  DATE: June 25, 2025  TIME: 10:58 AM

## 2025-07-02 ENCOUNTER — CONSULT (OUTPATIENT)
Dept: INTERNAL MEDICINE CLINIC | Facility: CLINIC | Age: 80
End: 2025-07-02
Payer: COMMERCIAL

## 2025-07-02 VITALS
DIASTOLIC BLOOD PRESSURE: 74 MMHG | RESPIRATION RATE: 16 BRPM | TEMPERATURE: 96.5 F | BODY MASS INDEX: 23.15 KG/M2 | SYSTOLIC BLOOD PRESSURE: 126 MMHG | HEART RATE: 72 BPM | OXYGEN SATURATION: 97 % | WEIGHT: 125.8 LBS | HEIGHT: 62 IN

## 2025-07-02 DIAGNOSIS — I35.0 SEVERE AORTIC STENOSIS: ICD-10-CM

## 2025-07-02 DIAGNOSIS — Z01.818 PRE-OP EXAMINATION: Primary | ICD-10-CM

## 2025-07-02 PROCEDURE — 99214 OFFICE O/P EST MOD 30 MIN: CPT

## 2025-07-02 PROCEDURE — G2211 COMPLEX E/M VISIT ADD ON: HCPCS

## 2025-07-02 NOTE — PROGRESS NOTES
Pre-operative Clearance  Name: Glendy Pete      : 1945      MRN: [id number]  Encounter Provider: FLORENCIO Varela  Encounter Date: 2025   Encounter department: [facility] INTERNAL MEDICINE [place]    :  Assessment & Plan  Pre-op examination  Patient is scheduled for a TAVR on 7/10/25 with Dr. Del Cid and Dr. Bruno at [facility]. Patient's history reviewed.   Labs and EKG were ordered by the surgeon, not completed yet.    Per cardiovascular surgery's note, patient will hold Eliquis 5 days before the surgery, but patient reports that she had stopped taking her Eliquis on her own.  Discussed the dangers of not taking her blood thinner including the risk of heart attack, stroke, or PE, patient verbalized understanding.  Encouraged her to discuss this with her cardiologist as well.    Patient is cleared for this procedure pending completion and review of blood work and EKG which was ordered by the surgeon.       Severe aortic stenosis  Symptomatic severe aortic stenosis.  She has been following with cardiology.  Discussed with the surgeon, procedure agreed upon.           Pre-operative Clearance:     Revised Cardiac Risk Index:  RCI RISK CLASS II (1 risk factor, risk of major cardiac complications approximately 1.3%)    Clearance:       EKG and blood work to be completed prior to clearance.    Medication Instructions:   - Beta blockers:  Continue to take this medication on your normal schedule.  - Calcium channel blockers: Continue to take this medication on your normal schedule.  - Direct Xa Inhibitor (ie, Eliquis, Xarelto): Stop taking medication 5 days prior to procedure/surgery. (Patient reports not taking this medication currently.)  - Hyperlipidemia meds: Continue to take this medication on your normal schedule.      Depression Screening and Follow-up Plan: Patient was screened for depression during today's encounter. They screened negative with a PHQ-2 score of  2.        History of Present Illness   {?Quick Links Encounters * My Last Note * Last Note in Specialty * Snapshot * Since Last Visit * History :22440}  Pre-Op Examination     Surgery: replacement aortic valve transcatheter (tavr) transfemoral w/ 26mm valve(access on right)   Anticipated Date of Surgery: 7/10/25   Surgeon: Dr. Del Cid and Dr. Bruno     Previous history of bleeding disorders or clots?: No    Previous Anesthesia reaction?: No    Prolonged steroid use in the last 6 months?: No      Assessment of Cardiac Risk:   - Unstable or severe angina or MI in the last 6 weeks or history of stent placement in the last year?: No    - Decompensated heart failure (e.g. New onset heart failure, NYHA  Class IV heart failure, or worsening existing heart failure)?: No    - Significant arrhythmias such as high grade AV block, symptomatic ventricular arrhythmia, newly recognized ventricular tachycardia, supraventricular tachycardia with resting heart rate >100, or symptomatic bradycardia?: No    - Severe heart valve disease including aortic stenosis or symptomatic mitral stenosis?: Yes      Pre-operative Risk Factors:  - Elevated-risk surgery: Yes    - History of cerebrovascular disease: No    - History of ischemic heart disease: No    - History of congestive heart failure: No    - Pre-operative treatment with insulin: No    - Pre-operative creatinine >2 mg/dL: No      Review of Systems   Constitutional:  Negative for chills and fever.   HENT:  Negative for congestion, ear pain, rhinorrhea and sore throat.    Eyes:  Negative for pain and visual disturbance.   Respiratory:  Negative for cough, chest tightness and shortness of breath.    Cardiovascular:  Negative for chest pain, palpitations and leg swelling.   Gastrointestinal:  Negative for abdominal pain, constipation, diarrhea, nausea and vomiting.   Endocrine: Negative.    Genitourinary:  Negative for dysuria, frequency, hematuria and urgency.   Musculoskeletal:   "Negative for arthralgias and back pain.   Skin:  Negative for color change and rash.   Allergic/Immunologic: Negative.    Neurological:  Negative for dizziness, seizures, syncope and headaches.   Hematological: Negative.    Psychiatric/Behavioral: Negative.     All other systems reviewed and are negative.    Past Medical History   Past Medical History[1]  Past Surgical History[2]  Family History[3]  Social History[4]  Medications[5]  Allergies   Allergen Reactions   • Codeine Nausea Only     Objective {?Quick Links Trend Vitals * Enter New Vitals * Results Review * Timeline (Adult) * Labs * Imaging * Cardiology * Procedures * Lung Cancer Screening * Surgical eConsent :94326}  /74 (BP Location: Left arm, Patient Position: Sitting, Cuff Size: Standard)   Pulse 72   Temp (!) 96.5 °F (35.8 °C) (Tympanic)   Resp 16   Ht 5' 2\" (1.575 m)   Wt 57.1 kg (125 lb 12.8 oz)   SpO2 97%   BMI 23.01 kg/m²     Physical Exam  Vitals and nursing note reviewed.   Constitutional:       General: She is not in acute distress.     Appearance: She is well-developed.   HENT:      Right Ear: Tympanic membrane normal.      Left Ear: Tympanic membrane normal.      Mouth/Throat:      Mouth: Mucous membranes are moist.      Pharynx: Oropharynx is clear.     Eyes:      Conjunctiva/sclera: Conjunctivae normal.       Cardiovascular:      Rate and Rhythm: Normal rate and regular rhythm.      Pulses: Normal pulses.      Heart sounds: Murmur heard.   Pulmonary:      Effort: Pulmonary effort is normal. No respiratory distress.      Breath sounds: Normal breath sounds.   Abdominal:      General: Bowel sounds are normal.      Palpations: Abdomen is soft.      Tenderness: There is no abdominal tenderness. There is no right CVA tenderness or left CVA tenderness.     Musculoskeletal:         General: No swelling. Normal range of motion.      Cervical back: Normal range of motion and neck supple.      Right lower leg: No edema.      Left lower " leg: No edema.     Skin:     General: Skin is warm and dry.      Capillary Refill: Capillary refill takes less than 2 seconds.     Neurological:      Mental Status: She is alert and oriented to person, place, and time.     Psychiatric:         Mood and Affect: Mood normal.           FLORENCIO Varela         [1]  Past Medical History:  Diagnosis Date   • Asthma    • Carotid artery stenosis     left asymptomatic 75-80%, right asymptomatic 40-45%   • Carotid stenosis, asymptomatic, left     70%   • CKD (chronic kidney disease), stage III (HCC)     baseline Cr 0.8-1.0   • History of colon cancer     s/p resection & chemotherapy   • History of melanoma excision    • History of nonadherence to medical treatment    • History of syncope     s/p SJM PPM   • [place] (hard of hearing)     no aids   • Hypertension    • Hypothyroidism    • PAF (paroxysmal atrial fibrillation) (HCC)     prior Flecanide, Cardizem, Eliquis   • Severe aortic stenosis    [2]  Past Surgical History:  Procedure Laterality Date   • CARDIAC CATHETERIZATION N/A 8/23/2024    Procedure: Cardiac Coronary Angiogram;  Surgeon: Olga Lidia Handley DO;  Location: [facility];  Service: Cardiology   • CARDIAC CATHETERIZATION  8/23/2024    Procedure: Cardiac catheterization;  Surgeon: Olga Lidia Handley DO;  [facility]: [facility];  Service: Cardiology   • CARDIAC PACEMAKER PLACEMENT  2020    [facility]   • COLON SURGERY  1992    colon resection with chemo   • CRANIOTOMY Right 1989    R bleed-from fall and hit head   • SKIN CANCER EXCISION Right     Melanoma R arm   [3]  Family History  Problem Relation Name Age of Onset   • Heart disease Mother     • COPD Sister     • Emphysema Sister     • Colon cancer Brother     • Liver cancer Paternal Grandfather     [4]  Social History  Tobacco Use   • Smoking status: Never   • Smokeless tobacco: Never   Vaping Use   • Vaping status: Never Used   Substance and Sexual Activity   • Alcohol use: Not  Currently   • Drug use: Never   • Sexual activity: Not Currently   [5]  Current Outpatient Medications on File Prior to Visit   Medication Sig   • albuterol (PROVENTIL HFA,VENTOLIN HFA) 90 mcg/act inhaler Inhale 2 puffs every 6 (six) hours as needed for wheezing   • atorvastatin (LIPITOR) 20 mg tablet Take 1 tablet (20 mg total) by mouth daily   • diltiazem (CARDIZEM CD) 180 mg 24 hr capsule Take 1 capsule (180 mg total) by mouth daily   • metoprolol succinate (TOPROL-XL) 25 mg 24 hr tablet Take 1 tablet (25 mg total) by mouth daily   • mupirocin (BACTROBAN) 2 % ointment Apply topically 2 (two) times a day   • apixaban (ELIQUIS) 5 mg Take 1 tablet (5 mg total) by mouth 2 (two) times a day (Patient not taking: Reported on 7/2/2025)   • Fluticasone Furoate-Vilanterol 100-25 mcg/actuation inhaler Inhale 1 puff daily Rinse mouth after use. (Patient not taking: Reported on 9/13/2024)

## 2025-07-02 NOTE — ASSESSMENT & PLAN NOTE
Symptomatic severe aortic stenosis.  She has been following with cardiology.  Discussed with the surgeon, procedure agreed upon.

## 2025-07-03 ENCOUNTER — APPOINTMENT (OUTPATIENT)
Dept: LAB | Facility: HOSPITAL | Age: 80
End: 2025-07-03
Attending: PHYSICIAN ASSISTANT
Payer: COMMERCIAL

## 2025-07-03 ENCOUNTER — OFFICE VISIT (OUTPATIENT)
Dept: LAB | Facility: HOSPITAL | Age: 80
End: 2025-07-03
Payer: COMMERCIAL

## 2025-07-03 ENCOUNTER — LAB REQUISITION (OUTPATIENT)
Dept: LAB | Facility: HOSPITAL | Age: 80
End: 2025-07-03
Payer: COMMERCIAL

## 2025-07-03 DIAGNOSIS — Z01.810 PRE-OPERATIVE CARDIOVASCULAR EXAMINATION: ICD-10-CM

## 2025-07-03 DIAGNOSIS — Z01.812 ENCOUNTER FOR PRE-OPERATIVE LABORATORY TESTING: ICD-10-CM

## 2025-07-03 DIAGNOSIS — I65.22 CAROTID STENOSIS, ASYMPTOMATIC, LEFT: Primary | ICD-10-CM

## 2025-07-03 DIAGNOSIS — I65.22 CAROTID STENOSIS, ASYMPTOMATIC, LEFT: ICD-10-CM

## 2025-07-03 DIAGNOSIS — I35.0 SEVERE AORTIC STENOSIS: ICD-10-CM

## 2025-07-03 DIAGNOSIS — I48.0 PAROXYSMAL ATRIAL FIBRILLATION (HCC): ICD-10-CM

## 2025-07-03 DIAGNOSIS — Z01.812 ENCOUNTER FOR PREPROCEDURAL LABORATORY EXAMINATION: ICD-10-CM

## 2025-07-03 LAB
ABO GROUP BLD: NORMAL
ALBUMIN SERPL BCG-MCNC: 4.3 G/DL (ref 3.5–5)
ALP SERPL-CCNC: 80 U/L (ref 34–104)
ALT SERPL W P-5'-P-CCNC: 10 U/L (ref 7–52)
ANION GAP SERPL CALCULATED.3IONS-SCNC: 8 MMOL/L (ref 4–13)
AST SERPL W P-5'-P-CCNC: 17 U/L (ref 13–39)
ATRIAL RATE: 76 BPM
BASOPHILS # BLD AUTO: 0.03 THOUSANDS/ÂΜL (ref 0–0.1)
BASOPHILS NFR BLD AUTO: 1 % (ref 0–1)
BILIRUB SERPL-MCNC: 0.66 MG/DL (ref 0.2–1)
BLD GP AB SCN SERPL QL: NEGATIVE
BUN SERPL-MCNC: 16 MG/DL (ref 5–25)
CALCIUM SERPL-MCNC: 9.4 MG/DL (ref 8.4–10.2)
CHLORIDE SERPL-SCNC: 111 MMOL/L (ref 96–108)
CO2 SERPL-SCNC: 25 MMOL/L (ref 21–32)
CREAT SERPL-MCNC: 0.92 MG/DL (ref 0.6–1.3)
EOSINOPHIL # BLD AUTO: 0.15 THOUSAND/ÂΜL (ref 0–0.61)
EOSINOPHIL NFR BLD AUTO: 3 % (ref 0–6)
ERYTHROCYTE [DISTWIDTH] IN BLOOD BY AUTOMATED COUNT: 13.2 % (ref 11.6–15.1)
GFR SERPL CREATININE-BSD FRML MDRD: 59 ML/MIN/1.73SQ M
GLUCOSE P FAST SERPL-MCNC: 99 MG/DL (ref 65–99)
HCT VFR BLD AUTO: 37.5 % (ref 34.8–46.1)
HGB BLD-MCNC: 12.3 G/DL (ref 11.5–15.4)
IMM GRANULOCYTES # BLD AUTO: 0.01 THOUSAND/UL (ref 0–0.2)
IMM GRANULOCYTES NFR BLD AUTO: 0 % (ref 0–2)
INR PPP: 1.03 (ref 0.85–1.19)
LYMPHOCYTES # BLD AUTO: 1.47 THOUSANDS/ÂΜL (ref 0.6–4.47)
LYMPHOCYTES NFR BLD AUTO: 30 % (ref 14–44)
MCH RBC QN AUTO: 30.2 PG (ref 26.8–34.3)
MCHC RBC AUTO-ENTMCNC: 32.8 G/DL (ref 31.4–37.4)
MCV RBC AUTO: 92 FL (ref 82–98)
MONOCYTES # BLD AUTO: 0.52 THOUSAND/ÂΜL (ref 0.17–1.22)
MONOCYTES NFR BLD AUTO: 10 % (ref 4–12)
NEUTROPHILS # BLD AUTO: 2.8 THOUSANDS/ÂΜL (ref 1.85–7.62)
NEUTS SEG NFR BLD AUTO: 56 % (ref 43–75)
NRBC BLD AUTO-RTO: 0 /100 WBCS
P AXIS: 59 DEGREES
PLATELET # BLD AUTO: 181 THOUSANDS/UL (ref 149–390)
PMV BLD AUTO: 9.2 FL (ref 8.9–12.7)
POTASSIUM SERPL-SCNC: 3.8 MMOL/L (ref 3.5–5.3)
PR INTERVAL: 154 MS
PROT SERPL-MCNC: 6.6 G/DL (ref 6.4–8.4)
PROTHROMBIN TIME: 14.2 SECONDS (ref 12.3–15)
QRS AXIS: 10 DEGREES
QRSD INTERVAL: 74 MS
QT INTERVAL: 404 MS
QTC INTERVAL: 455 MS
RBC # BLD AUTO: 4.07 MILLION/UL (ref 3.81–5.12)
RH BLD: POSITIVE
SODIUM SERPL-SCNC: 144 MMOL/L (ref 135–147)
SPECIMEN EXPIRATION DATE: NORMAL
T WAVE AXIS: 54 DEGREES
VENTRICULAR RATE: 76 BPM
WBC # BLD AUTO: 4.98 THOUSAND/UL (ref 4.31–10.16)

## 2025-07-03 PROCEDURE — 86900 BLOOD TYPING SEROLOGIC ABO: CPT | Performed by: PHYSICIAN ASSISTANT

## 2025-07-03 PROCEDURE — 86850 RBC ANTIBODY SCREEN: CPT | Performed by: PHYSICIAN ASSISTANT

## 2025-07-03 PROCEDURE — 93005 ELECTROCARDIOGRAM TRACING: CPT

## 2025-07-03 PROCEDURE — 85025 COMPLETE CBC W/AUTO DIFF WBC: CPT

## 2025-07-03 PROCEDURE — 80053 COMPREHEN METABOLIC PANEL: CPT

## 2025-07-03 PROCEDURE — 93010 ELECTROCARDIOGRAM REPORT: CPT | Performed by: INTERNAL MEDICINE

## 2025-07-03 PROCEDURE — 87081 CULTURE SCREEN ONLY: CPT

## 2025-07-03 PROCEDURE — 36415 COLL VENOUS BLD VENIPUNCTURE: CPT

## 2025-07-03 PROCEDURE — 86901 BLOOD TYPING SEROLOGIC RH(D): CPT | Performed by: PHYSICIAN ASSISTANT

## 2025-07-03 PROCEDURE — 85610 PROTHROMBIN TIME: CPT

## 2025-07-04 LAB — MRSA NOSE QL CULT: NORMAL

## 2025-07-08 DIAGNOSIS — I65.22 CAROTID STENOSIS, ASYMPTOMATIC, LEFT: ICD-10-CM

## 2025-07-08 DIAGNOSIS — Z01.812 ENCOUNTER FOR PRE-OPERATIVE LABORATORY TESTING: ICD-10-CM

## 2025-07-08 DIAGNOSIS — Z01.810 PRE-OPERATIVE CARDIOVASCULAR EXAMINATION: ICD-10-CM

## 2025-07-08 DIAGNOSIS — I48.0 PAROXYSMAL ATRIAL FIBRILLATION (HCC): ICD-10-CM

## 2025-07-08 DIAGNOSIS — I35.0 SEVERE AORTIC STENOSIS: ICD-10-CM

## 2025-07-08 NOTE — TELEPHONE ENCOUNTER
Patient is staying with her daughter and needs her ointment sent to a pharmacy near her due to leaving it at her house.    Reason for call:   [x] Refill   [] Prior Auth  [] Other:     Office:   [] PCP/Provider -   [x] Specialty/Provider - Cardiovascular Surg    Medication: Mupirocin    Dose/Frequency: 2% ointment applied topically 2x daily     Quantity: 22 g     Pharmacy: [id number] - [place] - [address] [phone]     Local Pharmacy   Does the patient have enough for 3 days?   [] Yes   [x] No - Send as HP to POD    Mail Away Pharmacy   Does the patient have enough for 10 days?   [] Yes   [] No - Send as HP to POD

## 2025-07-08 NOTE — TELEPHONE ENCOUNTER
Patient called to request a refill for their mupirocin advised a refill was requested on 7/8/25 and is pending approval. Patient verbalized understanding and is in agreement.     Does the patient have enough for 3 days?   [] Yes   [x] No - Send as HP to POD

## 2025-07-09 RX ORDER — PHENYLEPHRINE HCL IN 0.9% NACL 1 MG/10 ML
200-2000 SYRINGE (ML) INTRAVENOUS ONCE
Status: CANCELLED | OUTPATIENT
Start: 2025-07-10

## 2025-07-10 ENCOUNTER — APPOINTMENT (OUTPATIENT)
Dept: NON INVASIVE DIAGNOSTICS | Facility: HOSPITAL | Age: 80
DRG: 267 | End: 2025-07-10
Payer: COMMERCIAL

## 2025-07-10 ENCOUNTER — APPOINTMENT (INPATIENT)
Dept: RADIOLOGY | Facility: HOSPITAL | Age: 80
DRG: 267 | End: 2025-07-10
Payer: COMMERCIAL

## 2025-07-10 ENCOUNTER — ANESTHESIA EVENT (INPATIENT)
Dept: PERIOP | Facility: HOSPITAL | Age: 80
DRG: 267 | End: 2025-07-10
Payer: COMMERCIAL

## 2025-07-10 ENCOUNTER — HOSPITAL ENCOUNTER (INPATIENT)
Facility: HOSPITAL | Age: 80
LOS: 1 days | Discharge: HOME | DRG: 267 | End: 2025-07-11
Attending: THORACIC SURGERY (CARDIOTHORACIC VASCULAR SURGERY) | Admitting: THORACIC SURGERY (CARDIOTHORACIC VASCULAR SURGERY)
Payer: COMMERCIAL

## 2025-07-10 ENCOUNTER — ANESTHESIA (INPATIENT)
Dept: PERIOP | Facility: HOSPITAL | Age: 80
DRG: 267 | End: 2025-07-10
Payer: COMMERCIAL

## 2025-07-10 ENCOUNTER — TELEPHONE (OUTPATIENT)
Dept: INTERNAL MEDICINE CLINIC | Facility: CLINIC | Age: 80
End: 2025-07-10

## 2025-07-10 DIAGNOSIS — Z95.2 S/P TAVR (TRANSCATHETER AORTIC VALVE REPLACEMENT): ICD-10-CM

## 2025-07-10 DIAGNOSIS — I35.9 NONRHEUMATIC AORTIC VALVE DISORDER: ICD-10-CM

## 2025-07-10 DIAGNOSIS — Z95.2 S/P TAVR (TRANSCATHETER AORTIC VALVE REPLACEMENT): Primary | ICD-10-CM

## 2025-07-10 DIAGNOSIS — I35.0 SEVERE AORTIC STENOSIS: ICD-10-CM

## 2025-07-10 DIAGNOSIS — I35.0 SEVERE AORTIC STENOSIS: Primary | ICD-10-CM

## 2025-07-10 LAB
ABO GROUP BLD: NORMAL
ANION GAP SERPL CALCULATED.3IONS-SCNC: 6 MMOL/L (ref 4–13)
BASE EXCESS BLDA CALC-SCNC: -1 MMOL/L (ref -2–3)
BASE EXCESS BLDA CALC-SCNC: -2 MMOL/L (ref -2–3)
BASE EXCESS BLDA CALC-SCNC: -4 MMOL/L (ref -2–3)
BUN SERPL-MCNC: 18 MG/DL (ref 5–25)
CA-I BLD-SCNC: 1.16 MMOL/L (ref 1.12–1.32)
CA-I BLD-SCNC: 1.16 MMOL/L (ref 1.12–1.32)
CA-I BLD-SCNC: 1.26 MMOL/L (ref 1.12–1.32)
CALCIUM SERPL-MCNC: 8.8 MG/DL (ref 8.4–10.2)
CHLORIDE SERPL-SCNC: 109 MMOL/L (ref 96–108)
CO2 SERPL-SCNC: 25 MMOL/L (ref 21–32)
CREAT SERPL-MCNC: 0.77 MG/DL (ref 0.6–1.3)
GFR SERPL CREATININE-BSD FRML MDRD: 73 ML/MIN/1.73SQ M
GLUCOSE SERPL-MCNC: 100 MG/DL (ref 65–140)
GLUCOSE SERPL-MCNC: 102 MG/DL (ref 65–140)
GLUCOSE SERPL-MCNC: 107 MG/DL (ref 65–140)
GLUCOSE SERPL-MCNC: 108 MG/DL (ref 65–140)
GLUCOSE SERPL-MCNC: 118 MG/DL (ref 65–140)
GLUCOSE SERPL-MCNC: 88 MG/DL (ref 65–140)
HCO3 BLDA-SCNC: 21.4 MMOL/L (ref 22–28)
HCO3 BLDA-SCNC: 22.9 MMOL/L (ref 22–28)
HCO3 BLDA-SCNC: 24.7 MMOL/L (ref 24–30)
HCT VFR BLD AUTO: 34.6 % (ref 34.8–46.1)
HCT VFR BLD CALC: 30 % (ref 34.8–46.1)
HCT VFR BLD CALC: 32 % (ref 34.8–46.1)
HCT VFR BLD CALC: 34 % (ref 34.8–46.1)
HGB BLD-MCNC: 11.5 G/DL (ref 11.5–15.4)
HGB BLDA-MCNC: 10.2 G/DL (ref 11.5–15.4)
HGB BLDA-MCNC: 10.9 G/DL (ref 11.5–15.4)
HGB BLDA-MCNC: 11.6 G/DL (ref 11.5–15.4)
KCT BLD-ACNC: 125 SEC (ref 89–137)
KCT BLD-ACNC: 125 SEC (ref 89–137)
KCT BLD-ACNC: 331 SEC (ref 89–137)
PCO2 BLD: 23 MMOL/L (ref 21–32)
PCO2 BLD: 24 MMOL/L (ref 21–32)
PCO2 BLD: 26 MMOL/L (ref 21–32)
PCO2 BLD: 35.9 MM HG (ref 36–44)
PCO2 BLD: 38 MM HG (ref 36–44)
PCO2 BLD: 47.8 MM HG (ref 42–50)
PH BLD: 7.32 [PH] (ref 7.3–7.4)
PH BLD: 7.36 [PH] (ref 7.35–7.45)
PH BLD: 7.41 [PH] (ref 7.35–7.45)
PLATELET # BLD AUTO: 189 THOUSANDS/UL (ref 149–390)
PMV BLD AUTO: 9.3 FL (ref 8.9–12.7)
PO2 BLD: 274 MM HG (ref 75–129)
PO2 BLD: 316 MM HG (ref 75–129)
PO2 BLD: 45 MM HG (ref 35–45)
POTASSIUM BLD-SCNC: 3.4 MMOL/L (ref 3.5–5.3)
POTASSIUM BLD-SCNC: 3.8 MMOL/L (ref 3.5–5.3)
POTASSIUM BLD-SCNC: 4 MMOL/L (ref 3.5–5.3)
POTASSIUM SERPL-SCNC: 3.6 MMOL/L (ref 3.5–5.3)
RH BLD: POSITIVE
SAO2 % BLD FROM PO2: 100 % (ref 60–85)
SAO2 % BLD FROM PO2: 100 % (ref 60–85)
SAO2 % BLD FROM PO2: 77 % (ref 60–85)
SODIUM BLD-SCNC: 140 MMOL/L (ref 136–145)
SODIUM BLD-SCNC: 141 MMOL/L (ref 136–145)
SODIUM BLD-SCNC: 142 MMOL/L (ref 136–145)
SODIUM SERPL-SCNC: 140 MMOL/L (ref 135–147)
SPECIMEN SOURCE: ABNORMAL
SPECIMEN SOURCE: NORMAL
SPECIMEN SOURCE: NORMAL

## 2025-07-10 PROCEDURE — 86923 COMPATIBILITY TEST ELECTRIC: CPT

## 2025-07-10 PROCEDURE — 80048 BASIC METABOLIC PNL TOTAL CA: CPT | Performed by: PHYSICIAN ASSISTANT

## 2025-07-10 PROCEDURE — 85014 HEMATOCRIT: CPT

## 2025-07-10 PROCEDURE — 33361 REPLACE AORTIC VALVE PERQ: CPT | Performed by: THORACIC SURGERY (CARDIOTHORACIC VASCULAR SURGERY)

## 2025-07-10 PROCEDURE — 82947 ASSAY GLUCOSE BLOOD QUANT: CPT

## 2025-07-10 PROCEDURE — 85018 HEMOGLOBIN: CPT | Performed by: PHYSICIAN ASSISTANT

## 2025-07-10 PROCEDURE — 84295 ASSAY OF SERUM SODIUM: CPT

## 2025-07-10 PROCEDURE — 93355 ECHO TRANSESOPHAGEAL (TEE): CPT

## 2025-07-10 PROCEDURE — 33361 REPLACE AORTIC VALVE PERQ: CPT | Performed by: INTERNAL MEDICINE

## 2025-07-10 PROCEDURE — 94664 DEMO&/EVAL PT USE INHALER: CPT

## 2025-07-10 PROCEDURE — 71045 X-RAY EXAM CHEST 1 VIEW: CPT

## 2025-07-10 PROCEDURE — C1769 GUIDE WIRE: HCPCS | Performed by: THORACIC SURGERY (CARDIOTHORACIC VASCULAR SURGERY)

## 2025-07-10 PROCEDURE — C1751 CATH, INF, PER/CENT/MIDLINE: HCPCS | Performed by: THORACIC SURGERY (CARDIOTHORACIC VASCULAR SURGERY)

## 2025-07-10 PROCEDURE — 85049 AUTOMATED PLATELET COUNT: CPT | Performed by: PHYSICIAN ASSISTANT

## 2025-07-10 PROCEDURE — 85014 HEMATOCRIT: CPT | Performed by: PHYSICIAN ASSISTANT

## 2025-07-10 PROCEDURE — 85347 COAGULATION TIME ACTIVATED: CPT

## 2025-07-10 PROCEDURE — C1894 INTRO/SHEATH, NON-LASER: HCPCS | Performed by: THORACIC SURGERY (CARDIOTHORACIC VASCULAR SURGERY)

## 2025-07-10 PROCEDURE — 93005 ELECTROCARDIOGRAM TRACING: CPT

## 2025-07-10 PROCEDURE — 82948 REAGENT STRIP/BLOOD GLUCOSE: CPT

## 2025-07-10 PROCEDURE — NC001 PR NO CHARGE: Performed by: PHYSICIAN ASSISTANT

## 2025-07-10 PROCEDURE — 82803 BLOOD GASES ANY COMBINATION: CPT

## 2025-07-10 PROCEDURE — 30233N0 TRANSFUSION OF AUTOLOGOUS RED BLOOD CELLS INTO PERIPHERAL VEIN, PERCUTANEOUS APPROACH: ICD-10-PCS | Performed by: THORACIC SURGERY (CARDIOTHORACIC VASCULAR SURGERY)

## 2025-07-10 PROCEDURE — 82330 ASSAY OF CALCIUM: CPT

## 2025-07-10 PROCEDURE — B24BZZ4 ULTRASONOGRAPHY OF HEART WITH AORTA, TRANSESOPHAGEAL: ICD-10-PCS | Performed by: ANESTHESIOLOGY

## 2025-07-10 PROCEDURE — 02RF38Z REPLACEMENT OF AORTIC VALVE WITH ZOOPLASTIC TISSUE, PERCUTANEOUS APPROACH: ICD-10-PCS | Performed by: THORACIC SURGERY (CARDIOTHORACIC VASCULAR SURGERY)

## 2025-07-10 PROCEDURE — 05HY33Z INSERTION OF INFUSION DEVICE INTO UPPER VEIN, PERCUTANEOUS APPROACH: ICD-10-PCS | Performed by: ANESTHESIOLOGY

## 2025-07-10 PROCEDURE — C1760 CLOSURE DEV, VASC: HCPCS | Performed by: THORACIC SURGERY (CARDIOTHORACIC VASCULAR SURGERY)

## 2025-07-10 PROCEDURE — 84132 ASSAY OF SERUM POTASSIUM: CPT

## 2025-07-10 DEVICE — VLV EVOLUTFX-26 COMM US
Type: IMPLANTABLE DEVICE | Site: HEART | Status: FUNCTIONAL
Brand: EVOLUT™ FX

## 2025-07-10 DEVICE — PERCLOSE™ PROSTYLE™ SUTURE-MEDIATED CLOSURE AND REPAIR SYSTEM
Type: IMPLANTABLE DEVICE | Site: ARTERIAL | Status: FUNCTIONAL
Brand: PERCLOSE™ PROSTYLE™

## 2025-07-10 RX ORDER — PROPOFOL 10 MG/ML
INJECTION, EMULSION INTRAVENOUS AS NEEDED
Status: DISCONTINUED | OUTPATIENT
Start: 2025-07-10 | End: 2025-07-10

## 2025-07-10 RX ORDER — SUCCINYLCHOLINE/SOD CL,ISO/PF 100 MG/5ML
SYRINGE (ML) INTRAVENOUS AS NEEDED
Status: DISCONTINUED | OUTPATIENT
Start: 2025-07-10 | End: 2025-07-10

## 2025-07-10 RX ORDER — CEFAZOLIN SODIUM 2 G/50ML
SOLUTION INTRAVENOUS AS NEEDED
Status: DISCONTINUED | OUTPATIENT
Start: 2025-07-10 | End: 2025-07-10

## 2025-07-10 RX ORDER — METOPROLOL TARTRATE 1 MG/ML
INJECTION, SOLUTION INTRAVENOUS AS NEEDED
Status: DISCONTINUED | OUTPATIENT
Start: 2025-07-10 | End: 2025-07-10

## 2025-07-10 RX ORDER — CHLORHEXIDINE GLUCONATE ORAL RINSE 1.2 MG/ML
15 SOLUTION DENTAL ONCE
Status: COMPLETED | OUTPATIENT
Start: 2025-07-10 | End: 2025-07-10

## 2025-07-10 RX ORDER — ONDANSETRON 2 MG/ML
4 INJECTION INTRAMUSCULAR; INTRAVENOUS ONCE AS NEEDED
Status: DISCONTINUED | OUTPATIENT
Start: 2025-07-10 | End: 2025-07-10 | Stop reason: HOSPADM

## 2025-07-10 RX ORDER — SODIUM CHLORIDE, SODIUM LACTATE, POTASSIUM CHLORIDE, CALCIUM CHLORIDE 600; 310; 30; 20 MG/100ML; MG/100ML; MG/100ML; MG/100ML
INJECTION, SOLUTION INTRAVENOUS CONTINUOUS PRN
Status: DISCONTINUED | OUTPATIENT
Start: 2025-07-10 | End: 2025-07-10

## 2025-07-10 RX ORDER — HYDRALAZINE HYDROCHLORIDE 20 MG/ML
10 INJECTION INTRAMUSCULAR; INTRAVENOUS EVERY 6 HOURS PRN
Status: DISCONTINUED | OUTPATIENT
Start: 2025-07-10 | End: 2025-07-11 | Stop reason: HOSPADM

## 2025-07-10 RX ORDER — INSULIN LISPRO 100 [IU]/ML
2-12 INJECTION, SOLUTION INTRAVENOUS; SUBCUTANEOUS
Status: DISCONTINUED | OUTPATIENT
Start: 2025-07-10 | End: 2025-07-11 | Stop reason: HOSPADM

## 2025-07-10 RX ORDER — ACETAMINOPHEN 325 MG/1
650 TABLET ORAL EVERY 4 HOURS PRN
Status: DISCONTINUED | OUTPATIENT
Start: 2025-07-10 | End: 2025-07-11

## 2025-07-10 RX ORDER — PANTOPRAZOLE SODIUM 40 MG/1
40 TABLET, DELAYED RELEASE ORAL
Status: DISCONTINUED | OUTPATIENT
Start: 2025-07-10 | End: 2025-07-11 | Stop reason: HOSPADM

## 2025-07-10 RX ORDER — SODIUM CHLORIDE, SODIUM LACTATE, POTASSIUM CHLORIDE, CALCIUM CHLORIDE 600; 310; 30; 20 MG/100ML; MG/100ML; MG/100ML; MG/100ML
50 INJECTION, SOLUTION INTRAVENOUS CONTINUOUS
Status: DISCONTINUED | OUTPATIENT
Start: 2025-07-10 | End: 2025-07-10

## 2025-07-10 RX ORDER — SODIUM CHLORIDE, SODIUM GLUCONATE, SODIUM ACETATE, POTASSIUM CHLORIDE, MAGNESIUM CHLORIDE, SODIUM PHOSPHATE, DIBASIC, AND POTASSIUM PHOSPHATE .53; .5; .37; .037; .03; .012; .00082 G/100ML; G/100ML; G/100ML; G/100ML; G/100ML; G/100ML; G/100ML
INJECTION, SOLUTION INTRAVENOUS AS NEEDED
Status: DISCONTINUED | OUTPATIENT
Start: 2025-07-10 | End: 2025-07-10

## 2025-07-10 RX ORDER — ONDANSETRON 2 MG/ML
4 INJECTION INTRAMUSCULAR; INTRAVENOUS EVERY 6 HOURS PRN
Status: DISCONTINUED | OUTPATIENT
Start: 2025-07-10 | End: 2025-07-11 | Stop reason: HOSPADM

## 2025-07-10 RX ORDER — ESMOLOL HYDROCHLORIDE 10 MG/ML
INJECTION INTRAVENOUS AS NEEDED
Status: DISCONTINUED | OUTPATIENT
Start: 2025-07-10 | End: 2025-07-10

## 2025-07-10 RX ORDER — ALBUTEROL SULFATE 90 UG/1
2 INHALANT RESPIRATORY (INHALATION) EVERY 6 HOURS PRN
Status: DISCONTINUED | OUTPATIENT
Start: 2025-07-10 | End: 2025-07-11 | Stop reason: HOSPADM

## 2025-07-10 RX ORDER — CALCIUM GLUCONATE 20 MG/ML
2 INJECTION, SOLUTION INTRAVENOUS ONCE AS NEEDED
Status: DISCONTINUED | OUTPATIENT
Start: 2025-07-10 | End: 2025-07-11 | Stop reason: HOSPADM

## 2025-07-10 RX ORDER — FENTANYL CITRATE/PF 50 MCG/ML
25 SYRINGE (ML) INJECTION
Status: DISCONTINUED | OUTPATIENT
Start: 2025-07-10 | End: 2025-07-10 | Stop reason: HOSPADM

## 2025-07-10 RX ORDER — CEFAZOLIN SODIUM 2 G/50ML
2000 SOLUTION INTRAVENOUS EVERY 8 HOURS
Status: DISCONTINUED | OUTPATIENT
Start: 2025-07-10 | End: 2025-07-11 | Stop reason: HOSPADM

## 2025-07-10 RX ORDER — BISACODYL 10 MG
10 SUPPOSITORY, RECTAL RECTAL DAILY PRN
Status: DISCONTINUED | OUTPATIENT
Start: 2025-07-10 | End: 2025-07-11 | Stop reason: HOSPADM

## 2025-07-10 RX ORDER — METOPROLOL SUCCINATE 25 MG/1
25 TABLET, EXTENDED RELEASE ORAL DAILY
Status: DISCONTINUED | OUTPATIENT
Start: 2025-07-10 | End: 2025-07-11 | Stop reason: HOSPADM

## 2025-07-10 RX ORDER — DILTIAZEM HYDROCHLORIDE 180 MG/1
180 CAPSULE, COATED, EXTENDED RELEASE ORAL DAILY
Status: DISCONTINUED | OUTPATIENT
Start: 2025-07-10 | End: 2025-07-11 | Stop reason: HOSPADM

## 2025-07-10 RX ORDER — SODIUM CHLORIDE 9 MG/ML
INJECTION, SOLUTION INTRAVENOUS CONTINUOUS PRN
Status: DISCONTINUED | OUTPATIENT
Start: 2025-07-10 | End: 2025-07-10

## 2025-07-10 RX ORDER — CHLORHEXIDINE GLUCONATE ORAL RINSE 1.2 MG/ML
15 SOLUTION DENTAL 2 TIMES DAILY
Status: DISCONTINUED | OUTPATIENT
Start: 2025-07-10 | End: 2025-07-11 | Stop reason: HOSPADM

## 2025-07-10 RX ORDER — ROCURONIUM BROMIDE 10 MG/ML
INJECTION, SOLUTION INTRAVENOUS AS NEEDED
Status: DISCONTINUED | OUTPATIENT
Start: 2025-07-10 | End: 2025-07-10

## 2025-07-10 RX ORDER — ASPIRIN 81 MG/1
81 TABLET, CHEWABLE ORAL DAILY
Status: DISCONTINUED | OUTPATIENT
Start: 2025-07-10 | End: 2025-07-11 | Stop reason: HOSPADM

## 2025-07-10 RX ORDER — POLYETHYLENE GLYCOL 3350 17 G/17G
17 POWDER, FOR SOLUTION ORAL DAILY
Status: DISCONTINUED | OUTPATIENT
Start: 2025-07-10 | End: 2025-07-11 | Stop reason: HOSPADM

## 2025-07-10 RX ORDER — ATORVASTATIN CALCIUM 20 MG/1
20 TABLET, FILM COATED ORAL
Status: DISCONTINUED | OUTPATIENT
Start: 2025-07-10 | End: 2025-07-11 | Stop reason: HOSPADM

## 2025-07-10 RX ORDER — FLUTICASONE FUROATE AND VILANTEROL 100; 25 UG/1; UG/1
1 POWDER RESPIRATORY (INHALATION) DAILY
Status: DISCONTINUED | OUTPATIENT
Start: 2025-07-10 | End: 2025-07-11 | Stop reason: HOSPADM

## 2025-07-10 RX ORDER — PROTAMINE SULFATE 10 MG/ML
INJECTION, SOLUTION INTRAVENOUS AS NEEDED
Status: DISCONTINUED | OUTPATIENT
Start: 2025-07-10 | End: 2025-07-10

## 2025-07-10 RX ORDER — CEFAZOLIN SODIUM 2 G/50ML
2000 SOLUTION INTRAVENOUS ONCE
Status: DISCONTINUED | OUTPATIENT
Start: 2025-07-10 | End: 2025-07-10 | Stop reason: HOSPADM

## 2025-07-10 RX ORDER — LIDOCAINE HYDROCHLORIDE 10 MG/ML
INJECTION, SOLUTION EPIDURAL; INFILTRATION; INTRACAUDAL; PERINEURAL AS NEEDED
Status: DISCONTINUED | OUTPATIENT
Start: 2025-07-10 | End: 2025-07-10

## 2025-07-10 RX ORDER — HEPARIN SODIUM 1000 [USP'U]/ML
400 INJECTION, SOLUTION INTRAVENOUS; SUBCUTANEOUS ONCE
Status: COMPLETED | OUTPATIENT
Start: 2025-07-10 | End: 2025-07-10

## 2025-07-10 RX ADMIN — CHLORHEXIDINE GLUCONATE 15 ML: 1.2 SOLUTION ORAL at 10:32

## 2025-07-10 RX ADMIN — CEFAZOLIN SODIUM 2000 MG: 2 SOLUTION INTRAVENOUS at 13:11

## 2025-07-10 RX ADMIN — ACETAMINOPHEN 650 MG: 325 TABLET ORAL at 17:45

## 2025-07-10 RX ADMIN — ASPIRIN 81 MG CHEWABLE TABLET 81 MG: 81 TABLET CHEWABLE at 17:44

## 2025-07-10 RX ADMIN — FENTANYL CITRATE 25 MCG: 50 INJECTION INTRAMUSCULAR; INTRAVENOUS at 16:17

## 2025-07-10 RX ADMIN — SODIUM CHLORIDE, SODIUM LACTATE, POTASSIUM CHLORIDE, AND CALCIUM CHLORIDE: .6; .31; .03; .02 INJECTION, SOLUTION INTRAVENOUS at 12:35

## 2025-07-10 RX ADMIN — Medication 2.5 MG: at 17:44

## 2025-07-10 RX ADMIN — CEFAZOLIN SODIUM 2000 MG: 2 SOLUTION INTRAVENOUS at 21:10

## 2025-07-10 RX ADMIN — ESMOLOL HYDROCHLORIDE 60 MG: 100 INJECTION, SOLUTION INTRAVENOUS at 12:58

## 2025-07-10 RX ADMIN — PROPOFOL 100 MG: 10 INJECTION, EMULSION INTRAVENOUS at 12:59

## 2025-07-10 RX ADMIN — NICARDIPINE HYDROCHLORIDE 7.5 MG/HR: 25 INJECTION, SOLUTION INTRAVENOUS at 14:45

## 2025-07-10 RX ADMIN — HEPARIN SODIUM 8000 UNITS: 1000 INJECTION, SOLUTION INTRAVENOUS; SUBCUTANEOUS at 13:44

## 2025-07-10 RX ADMIN — SODIUM CHLORIDE: 9 INJECTION, SOLUTION INTRAVENOUS at 13:05

## 2025-07-10 RX ADMIN — FENTANYL CITRATE 25 MCG: 50 INJECTION INTRAMUSCULAR; INTRAVENOUS at 16:49

## 2025-07-10 RX ADMIN — DILTIAZEM HYDROCHLORIDE 180 MG: 180 CAPSULE, COATED, EXTENDED RELEASE ORAL at 17:44

## 2025-07-10 RX ADMIN — PANTOPRAZOLE SODIUM 40 MG: 40 TABLET, DELAYED RELEASE ORAL at 17:44

## 2025-07-10 RX ADMIN — MUPIROCIN 1 APPLICATION: 20 OINTMENT TOPICAL at 10:32

## 2025-07-10 RX ADMIN — ROCURONIUM BROMIDE 30 MG: 10 INJECTION, SOLUTION INTRAVENOUS at 13:02

## 2025-07-10 RX ADMIN — NICARDIPINE HYDROCHLORIDE 10 MG/HR: 25 INJECTION, SOLUTION INTRAVENOUS at 16:43

## 2025-07-10 RX ADMIN — METOPROLOL SUCCINATE 25 MG: 25 TABLET, EXTENDED RELEASE ORAL at 17:44

## 2025-07-10 RX ADMIN — Medication 1 EACH: at 12:58

## 2025-07-10 RX ADMIN — MUPIROCIN 1 APPLICATION: 20 OINTMENT TOPICAL at 21:10

## 2025-07-10 RX ADMIN — SODIUM CHLORIDE, SODIUM GLUCONATE, SODIUM ACETATE, POTASSIUM CHLORIDE, MAGNESIUM CHLORIDE, SODIUM PHOSPHATE, DIBASIC, AND POTASSIUM PHOSPHATE 500 ML: .53; .5; .37; .037; .03; .012; .00082 INJECTION, SOLUTION INTRAVENOUS at 12:58

## 2025-07-10 RX ADMIN — SUGAMMADEX 200 MG: 100 INJECTION, SOLUTION INTRAVENOUS at 14:02

## 2025-07-10 RX ADMIN — METOPROLOL TARTRATE 2 MG: 1 INJECTION, SOLUTION INTRAVENOUS at 14:12

## 2025-07-10 RX ADMIN — Medication 80 MG: at 12:59

## 2025-07-10 RX ADMIN — LIDOCAINE HYDROCHLORIDE 50 MG: 10 INJECTION, SOLUTION EPIDURAL; INFILTRATION; INTRACAUDAL; PERINEURAL at 12:58

## 2025-07-10 RX ADMIN — NICARDIPINE HYDROCHLORIDE 5 MG/HR: 2.5 INJECTION, SOLUTION INTRAVENOUS at 13:50

## 2025-07-10 RX ADMIN — POLYETHYLENE GLYCOL 3350 17 G: 17 POWDER, FOR SOLUTION ORAL at 17:44

## 2025-07-10 RX ADMIN — NICARDIPINE HYDROCHLORIDE 2.5 MG/HR: 25 INJECTION, SOLUTION INTRAVENOUS at 22:20

## 2025-07-10 RX ADMIN — PROTAMINE SULFATE 50 MG: 10 INJECTION, SOLUTION INTRAVENOUS at 13:57

## 2025-07-10 NOTE — ANESTHESIA PROCEDURE NOTES
Arterial Line Insertion    Performed by: Rafi Purcell CRNA  Authorized by: Conner Rosado MD  Consent: Verbal consent obtained  Risks and benefits: risks, benefits and alternatives were discussed  Consent given by: patient  Patient understanding: patient states understanding of the procedure being performed  Patient consent: the patient's understanding of the procedure matches consent given  Procedure consent: procedure consent matches procedure scheduled  Preparation: Patient was prepped and draped in the usual sterile fashion.  Indications: hemodynamic monitoring  Orientation:  Left  Location: radial artery  Procedure Details:      Line Type: Arterial Line  Mauro's test normal: yes  Needle gauge: 20  Placement technique:  Ultrasound guided  Ultrasound image availability:  Vascular entry visualized in real time  Number of attempts: 1    Post-procedure:  Post-procedure: dressing applied  Waveform: good waveform  Post-procedure CNS: normal  Patient tolerance: Patient tolerated the procedure well with no immediate complications

## 2025-07-10 NOTE — TELEPHONE ENCOUNTER
Scheduled a milton,  they said she is d/c graciela from cardiac surgery,  so milton note graciela please

## 2025-07-10 NOTE — PLAN OF CARE
Problem: Prexisting or High Potential for Compromised Skin Integrity  Goal: Skin integrity is maintained or improved  Description: INTERVENTIONS:  - Identify patients at risk for skin breakdown  - Assess and monitor skin integrity including under and around medical devices   - Assess and monitor nutrition and hydration status  - Monitor labs  - Assess for incontinence   - Turn and reposition patient  - Assist with mobility/ambulation  - Relieve pressure over ulysses prominences   - Avoid friction and shearing  - Provide appropriate hygiene as needed including keeping skin clean and dry  - Evaluate need for skin moisturizer/barrier cream  - Collaborate with interdisciplinary team  - Patient/family teaching  - Consider wound care consult    Assess:  - Review Dwain scale daily  - Inspect skin when repositioning, toileting, and assisting with ADLS  - Assess extremities for adequate circulation and sensation     Bed Management:  - Have minimal linens on bed & keep smooth, unwrinkled  - Change linens as needed when moist or perspiringdegrees   - Toileting:  - Offer bedside commode    Activity:  - Encourage activity and walks on unit  - Encourage or provide ROM exercises   - Turn and reposition patient every 2 Hours  - Use appropriate equipment to lift or move patient in bed      Skin Care:  - Avoid use of baby powder, tape, friction and shearing, hot water or constrictive clothing  - Do not massage red bony areas    Next Steps:    Outcome: Progressing

## 2025-07-10 NOTE — ANESTHESIA POSTPROCEDURE EVALUATION
Post-Op Assessment Note    CV Status:  Stable  Pain Score: 0    Pain management: adequate       Mental Status:  Awake and sleepy   Hydration Status:  Euvolemic   PONV Controlled:  Controlled   Airway Patency:  Patent     Post Op Vitals Reviewed: Yes    No anethesia notable event occurred.    Staff: Anesthesiologist, CRNA           Last Filed PACU Vitals:  Vitals Value Taken Time   Temp     Pulse 85 07/10/25 14:20   /58    Resp 12    SpO2 100 % 07/10/25 14:20   Vitals shown include unfiled device data.

## 2025-07-10 NOTE — INTERVAL H&P NOTE
H&P reviewed. After examining the patient I find no changes in the patients condition since the H&P had been written.    Vitals:    07/10/25 1055   BP: 146/77   Pulse:    Resp:    Temp:    SpO2:      Anticipated Length of Stay:  Patient will be admitted on an Inpatient basis with an anticipated length of stay of  greater than 2 midnights.       Justification for Hospital Stay:  Post surgical recovery following open heart surgery.

## 2025-07-10 NOTE — OP NOTE
OPERATIVE REPORT  PATIENT NAME: Glendy Pete    :  1945  MRN: [id number]  Pt Location: [facility] OR ROOM 02    SURGERY DATE: 7/10/2025    SURGEON: Herbert Del Cid MD    CO-SURGEON: Paras Bruno DO    ASSISTANT: Orville Navarrete PA-C    ADDITIONAL ASSISTANT: N/A    PREOPERATIVE DIAGNOSIS: Symptomatic severe aortic stenosis.     POSTOPERATIVE DIAGNOSIS: Symptomatic severe aortic stenosis.     NYHA Class: 2  CCS Class: 1    PROCEDURE:   Transcatheter aortic valve replacement with a 26 mm Medtronic Evolut FX+ bioprosthetic valve via right transfemoral approach.     ANESTHESIA Dr. Nicho Rosado, general endotracheal anesthesia with transesophageal echocardiogram guidance.     INDICATIONS:  The patient is a 79 y.o. year-old female with clinical and echocardiographic findings consistent with severe aortic stenosis. The patient was evaluated in our heart valve center, we recommended the procedure described previously.    FINDINGS:  1. Intraoperative transesophageal echocardiogram revealed severe aortic stenosis.  2. Final transesophageal echocardiogram demonstrated prosthetic valve with normal function no perivalvular leak.       OPERATIVE TECHNIQUE:    The patient was taken to the operating room and placed supine on the operating table. Following the satisfactory induction of general anesthesia and placement of monitoring lines, the patient was prepped and draped in the usual sterile fashion. A time-out procedure was performed.    The left common femoral artery and right common femoral vein were accessed percutaneously using Seldinger technique and fluoroscopy. A pigtail catheter was inserted and advanced to the non-coronary cusp, an aortogram was performed to determine proper angle and orientation for valve deployment using the cusp overlap technique. Through the right common femoral vein sheath, a balloon-tip temporary pacing catheter was inserted and advanced into the right ventricle and its capture  was confirmed.     The right common femoral artery was accessed percutaneously using Seldinger technique and fluoroscopy. Two (2) Perclose sutures were deployed in the standard fashion. The patient was systemically heparinized. An 8fr sheath was introduced. A 6 [place] Seamus Right 4 coronary catheter was advanced through the sheath into the aortic valve. The aortic valve was crossed with a 0.035 straight-tip wire, the right coronary catheter was advanced into the left ventricular cavity, the wire was then exchanged for a curved tip extra stiff wire.  A 26 mm Medtronic Evolut FX+ valve delivery system was introduced through the right common femoral artery and advanced into the aorta, the aortic valve was crossed. The valve was positioned with fluoroscopy using the cusp overlap technique. During an episode of rapid pacing the valve was partially deployed to annulus contact, proper placement was confirmed with aortography followed by complete valve deployment. MARILYN demonstrated no perivalvular leak.     The valve delivery system was removed followed while the Perclose sutures were secured and direct pressure was held. Protamine was administered with normalization of the ACT. Pressure was released, without evidence of active bleeding. The left common femoral artery sheath was removed followed by deployment of a closure device. The right common femoral vein sheath was removed and pressure was held.     COMPLICATIONS: None    PACKS/TUBES/DRAINS: None.     EBL: 20 cc.    TRANSFUSION: None.     SPECIMENS: None      As the attending surgeon, I was present and scrubbed for all critical portions of this procedure. There was no qualified surgical resident available. Sponge, needle and instrument counts were reported as correct by the nursing staff. A cardiology co-surgeon was required as is a CMS requirement    SIGNATURE: Herbert Del Cid MD  DATE: July 10, 2025  TIME: 3:19 PM

## 2025-07-10 NOTE — ANESTHESIA PROCEDURE NOTES
"Central Line Insertion    Performed by: Conner Rosado MD  Authorized by: Conner Rosado MD    Date/Time: 7/10/2025 1:05 PM  Catheter Type:  triple lumen  Consent: Written consent obtained  Risks and benefits: risks, benefits and alternatives were discussed  Consent given by: patient  Patient understanding: patient states understanding of the procedure being performed  Patient consent: the patient's understanding of the procedure matches consent given  Procedure consent: procedure consent matches procedure scheduled  Required items: required blood products, implants, devices, and special equipment available  Patient identity confirmed: arm band  Time out: Immediately prior to procedure a \"time out\" was called to verify the correct patient, procedure, equipment, support staff and site/side marked as required.  Indications: vascular access  Patient position: Trendelenburg    Sedation:  Patient sedated: yes    Assessment: blood return through all ports and free fluid flow  Preparation: skin prepped with ChloraPrep  Skin prep agent dried: skin prep agent completely dried prior to procedure  Sterile barriers: all five maximum sterile barriers used - cap, mask, sterile gown, sterile gloves, and large sterile sheet  Hand hygiene: hand hygiene performed prior to central venous catheter insertion  sterile gel and probe cover used in ultrasound-guided central venous catheter insertionPre-procedure: landmarks identified  Number of attempts: 1  Successful placement: yes  Post-procedure: chlorhexidine patch applied, dressing applied and line sutured  Patient tolerance: patient tolerated the procedure well with no immediate complications  Comments: Procedure start 1259  Procedure end 1305        "

## 2025-07-10 NOTE — ANESTHESIA PROCEDURE NOTES
Procedure Performed: MARILYN Anesthesia  Start Time:  7/10/2025 1:06 PM        Preanesthesia Checklist    Patient identified, IV assessed, risks and benefits discussed, monitors and equipment assessed, procedure being performed at surgeon's request and anesthesia consent obtained.      Procedure    Diagnostic Indications for MARILYN:  assessment of surgical repair. Type of MARILYN: interventional MARILYN with real time guidance of intracardiac procedure. Images Saved: ultrasound permanent image saved. Physician Requesting Echo: Herbert Del Cid MD.  Location performed: OR. Intubated. Bite block not placed.   Heart visualized. Insertion of MARILYN Probe:  Atraumatic. Probe Type:  Multiplane. Modalities:  3D, color flow mapping, continuous wave Doppler and pulse wave Doppler.      Echocardiographic and Doppler Measurements    PREPROCEDURE    LEFT VENTRICLE:  Systolic Function: normal. Ejection Fraction: 65%. Cavity size: normal.       RIGHT VENTRICLE:  Systolic Function: normal.  Cavity size normal. No hypertrophy              AORTIC VALVE:  Leaflets: trileaflet. Leaflet motions restricted. Stenosis: severe.     Regurgitation: none.      MITRAL VALVE:  Leaflets: normal. Leaflet Motions: normal. Regurgitation: mild.   Stenosis: none.       TRICUSPID VALVE:  Leaflets: normal. Leaflet Motions: normal. Stenosis: none. Regurgitation: mild.      PULMONIC VALVE:  Leaflets: normal. Regurgitation: none. Stenosis: none.        ASCENDING AORTA:  Size:  normal.  Dissection not present.      AORTIC ARCH:  Size:  normal.  dissection not present. Grade 2: severe intimal thickening without protruding atheroma.    DESCENDING AORTA:  Size: normal.  Dissection not present. Grade 3: atheroma protruding < 0.5 cm into lumen.        RIGHT ATRIUM:  Size:  normal.     LEFT ATRIUM:  Size: normal.     LEFT ATRIAL APPENDAGE:  Size: normal.          ATRIAL SEPTUM:  Intra-atrial septal morphology: normal.          VENTRICULAR SEPTUM:  Intra-ventricular septum  morphology: normal.             OTHER FINDINGS:  Pericardium:  normal. Pleural Effusion:  none.        POSTPROCEDURE    LEFT VENTRICLE: Unchanged .         RIGHT VENTRICLE: Unchanged .           AORTIC VALVE:   Leaflets: bioprosthetic. Stenosis: none. Mean Gradient: 2 mmHg. Regurgitation: none.  Valve Size: 26 mm.    MITRAL VALVE: Unchanged .         TRICUSPID VALVE: Unchanged .        PULMONIC VALVE: Unchanged             ATRIA: Unchanged .          AORTA: Unchanged .        REMOVAL:  Probe Removal: atraumatic.

## 2025-07-10 NOTE — ANESTHESIA PREPROCEDURE EVALUATION
Procedure:  REPLACEMENT AORTIC VALVE TRANSCATHETER (TAVR) TRANSFEMORAL W/ 26MM VALVE(ACCESS ON RIGHT) MARILYN (Chest)  Cardiac tavr (Chest)    Relevant Problems   CARDIO   (+) Carotid stenosis, asymptomatic, left   (+) Pacemaker   (+) Paroxysmal atrial fibrillation (HCC)   (+) Primary hypertension   (+) Severe aortic stenosis      ENDO   (+) Hypothyroid      /RENAL   (+) Stage 3a chronic kidney disease (HCC)      PULMONARY   (+) Mild intermittent asthma without complication        Physical Exam    Airway     Mallampati score: II    Neck ROM: full      Cardiovascular      Dental       Pulmonary      Neurological      Other Findings  post-pubertal.      Anesthesia Plan  ASA Score- 4     Anesthesia Type- general with ASA Monitors.         Additional Monitors: arterial line, central veinous line and pulmonary artery catheter.    Airway Plan: Oral ETT.    Comment: I, Dr. Rosado, the attending physician, have personally seen and evaluated the patient prior to anesthetic care.  I have reviewed the pre-anesthetic record, and other medical records if appropriate to the anesthetic care.  If a CRNA is involved in the case, I have reviewed the CRNA assessment, if present, and agree.  The patient is in a suitable condition to proceed with my formulated anesthetic plan.  .       Plan Factors-    Chart reviewed.                      Induction- intravenous.    Postoperative Plan-         Informed Consent- Anesthetic plan and risks discussed with patient.  I personally reviewed this patient with the CRNA. Discussed and agreed on the Anesthesia Plan with the CRNA..      NPO Status:  Vitals Value Taken Time   Date of last liquid 07/09/25 07/10/25 10:21   Time of last liquid 2130 07/10/25 10:21   Date of last solid 07/09/25 07/10/25 10:21   Time of last solid 1930 07/10/25 10:21

## 2025-07-11 ENCOUNTER — APPOINTMENT (INPATIENT)
Dept: NON INVASIVE DIAGNOSTICS | Facility: HOSPITAL | Age: 80
DRG: 267 | End: 2025-07-11
Attending: PHYSICIAN ASSISTANT
Payer: COMMERCIAL

## 2025-07-11 ENCOUNTER — APPOINTMENT (INPATIENT)
Dept: RADIOLOGY | Facility: HOSPITAL | Age: 80
DRG: 267 | End: 2025-07-11
Payer: COMMERCIAL

## 2025-07-11 VITALS
OXYGEN SATURATION: 94 % | DIASTOLIC BLOOD PRESSURE: 58 MMHG | RESPIRATION RATE: 20 BRPM | WEIGHT: 127 LBS | BODY MASS INDEX: 23.37 KG/M2 | TEMPERATURE: 98.3 F | SYSTOLIC BLOOD PRESSURE: 119 MMHG | HEIGHT: 62 IN | HEART RATE: 60 BPM

## 2025-07-11 LAB
ANION GAP SERPL CALCULATED.3IONS-SCNC: 11 MMOL/L (ref 4–13)
AORTIC ROOT: 2.2 CM
AORTIC VALVE MEAN VELOCITY: 11.5 M/S
AORTIC VALVE MEAN VELOCITY: 6.1 M/S
ASCENDING AORTA: 3.1 CM
ATRIAL RATE: 241 BPM
ATRIAL RATE: 88 BPM
AV AREA BY CONTINUOUS VTI: 1.7 CM2
AV AREA BY CONTINUOUS VTI: 2 CM2
AV AREA PEAK VELOCITY: 1.6 CM2
AV AREA PEAK VELOCITY: 1.6 CM2
AV LVOT MEAN GRADIENT: 1 MMHG
AV LVOT MEAN GRADIENT: 3 MMHG
AV LVOT PEAK GRADIENT: 2 MMHG
AV LVOT PEAK GRADIENT: 4 MMHG
AV MEAN PRESS GRAD SYS DOP V1V2: 2 MMHG
AV MEAN PRESS GRAD SYS DOP V1V2: 6 MMHG
AV ORIFICE AREA US: 1.65 CM2
AV ORIFICE AREA US: 2.04 CM2
AV PEAK GRADIENT: 10 MMHG
AV PEAK GRADIENT: 4 MMHG
AV VELOCITY RATIO: 0.73
AV VELOCITY RATIO: 0.9
AV VMAX SYS DOP: 1.55 M/S
BSA FOR ECHO PROCEDURE: 1.58 M2
BUN SERPL-MCNC: 17 MG/DL (ref 5–25)
CALCIUM SERPL-MCNC: 8.7 MG/DL (ref 8.4–10.2)
CHLORIDE SERPL-SCNC: 106 MMOL/L (ref 96–108)
CO2 SERPL-SCNC: 24 MMOL/L (ref 21–32)
CREAT SERPL-MCNC: 0.75 MG/DL (ref 0.6–1.3)
DOP CALC AO VTI: 15.2 CM
DOP CALC AO VTI: 33.58 CM
DOP CALC LVOT AREA: 2.27 CM2
DOP CALC LVOT AREA: 2.3
DOP CALC LVOT CARDIAC INDEX: 2.43 L/MIN/M2
DOP CALC LVOT CARDIAC OUTPUT: 3.83 L/MIN
DOP CALC LVOT DIAMETER: 1.7 CM
DOP CALC LVOT DIAMETER: 1.7 CM
DOP CALC LVOT PEAK VEL VTI: 13.7 CM
DOP CALC LVOT PEAK VEL VTI: 24.42 CM
DOP CALC LVOT PEAK VEL: 0.7 M/S
DOP CALC LVOT PEAK VEL: 1.06 M/S
DOP CALC LVOT STROKE INDEX: 19.9 ML/M2
DOP CALC LVOT STROKE INDEX: 35.4 ML/M2
DOP CALC LVOT STROKE VOLUME: 31
DOP CALC LVOT STROKE VOLUME: 55.4
E WAVE DECELERATION TIME: 268 MS
E/A RATIO: 2.18
ERYTHROCYTE [DISTWIDTH] IN BLOOD BY AUTOMATED COUNT: 13.5 % (ref 11.6–15.1)
FRACTIONAL SHORTENING: 42 (ref 28–44)
GFR SERPL CREATININE-BSD FRML MDRD: 76 ML/MIN/1.73SQ M
GLUCOSE SERPL-MCNC: 116 MG/DL (ref 65–140)
GLUCOSE SERPL-MCNC: 116 MG/DL (ref 65–140)
GLUCOSE SERPL-MCNC: 136 MG/DL (ref 65–140)
GLUCOSE SERPL-MCNC: 84 MG/DL (ref 65–140)
HCT VFR BLD AUTO: 32.8 % (ref 34.8–46.1)
HGB BLD-MCNC: 10.8 G/DL (ref 11.5–15.4)
INTERVENTRICULAR SEPTUM IN DIASTOLE (PARASTERNAL SHORT AXIS VIEW): 1.2 CM
INTERVENTRICULAR SEPTUM: 1.2 CM (ref 0.6–1.1)
LAAS-AP2: 22.8 CM2
LAAS-AP4: 20.9 CM2
LEFT ATRIUM SIZE: 3.7 CM
LEFT ATRIUM VOLUME (MOD BIPLANE): 64 ML
LEFT ATRIUM VOLUME INDEX (MOD BIPLANE): 40.5 ML/M2
LEFT INTERNAL DIMENSION IN SYSTOLE: 2.5 CM (ref 2.1–4)
LEFT VENTRICULAR INTERNAL DIMENSION IN DIASTOLE: 4.3 CM (ref 3.5–6)
LEFT VENTRICULAR POSTERIOR WALL IN END DIASTOLE: 1.2 CM
LEFT VENTRICULAR STROKE VOLUME: 64 ML
LV EF US.2D.A4C+ESTIMATED: 64 %
LVSV (TEICH): 64 ML
MAGNESIUM SERPL-MCNC: 1.7 MG/DL (ref 1.9–2.7)
MCH RBC QN AUTO: 30.1 PG (ref 26.8–34.3)
MCHC RBC AUTO-ENTMCNC: 32.9 G/DL (ref 31.4–37.4)
MCV RBC AUTO: 91 FL (ref 82–98)
MV E'TISSUE VEL-LAT: 6 CM/S
MV E'TISSUE VEL-SEP: 5 CM/S
MV PEAK A VEL: 0.72 M/S
MV PEAK E VEL: 157 CM/S
MV STENOSIS PRESSURE HALF TIME: 78 MS
MV VALVE AREA P 1/2 METHOD: 2.82
P AXIS: 69 DEGREES
PLATELET # BLD AUTO: 196 THOUSANDS/UL (ref 149–390)
PMV BLD AUTO: 9.4 FL (ref 8.9–12.7)
POTASSIUM SERPL-SCNC: 3.8 MMOL/L (ref 3.5–5.3)
PR INTERVAL: 146 MS
QRS AXIS: 28 DEGREES
QRS AXIS: 39 DEGREES
QRSD INTERVAL: 74 MS
QRSD INTERVAL: 84 MS
QT INTERVAL: 372 MS
QT INTERVAL: 400 MS
QTC INTERVAL: 450 MS
QTC INTERVAL: 492 MS
RA PRESSURE ESTIMATED: 3 MMHG
RBC # BLD AUTO: 3.59 MILLION/UL (ref 3.81–5.12)
RIGHT ATRIUM AREA SYSTOLE A4C: 14.2 CM2
RIGHT VENTRICLE ID DIMENSION: 2.9 CM
RV PSP: 54 MMHG
SL CV LEFT ATRIUM LENGTH A2C: 5.8 CM
SL CV LV EF: 60
SL CV PED ECHO LEFT VENTRICLE DIASTOLIC VOLUME (MOD BIPLANE) 2D: 85 ML
SL CV PED ECHO LEFT VENTRICLE SYSTOLIC VOLUME (MOD BIPLANE) 2D: 22 ML
SODIUM SERPL-SCNC: 141 MMOL/L (ref 135–147)
T WAVE AXIS: -27 DEGREES
T WAVE AXIS: 55 DEGREES
TR MAX PG: 51 MMHG
TR PEAK VELOCITY: 3.6 M/S
TRICUSPID ANNULAR PLANE SYSTOLIC EXCURSION: 2.1 CM
TRICUSPID VALVE PEAK REGURGITATION VELOCITY: 3.55 M/S
VENTRICULAR RATE: 88 BPM
VENTRICULAR RATE: 91 BPM
WBC # BLD AUTO: 8.68 THOUSAND/UL (ref 4.31–10.16)

## 2025-07-11 PROCEDURE — 80048 BASIC METABOLIC PNL TOTAL CA: CPT | Performed by: PHYSICIAN ASSISTANT

## 2025-07-11 PROCEDURE — 71045 X-RAY EXAM CHEST 1 VIEW: CPT

## 2025-07-11 PROCEDURE — 93005 ELECTROCARDIOGRAM TRACING: CPT

## 2025-07-11 PROCEDURE — 83735 ASSAY OF MAGNESIUM: CPT | Performed by: PHYSICIAN ASSISTANT

## 2025-07-11 PROCEDURE — 99222 1ST HOSP IP/OBS MODERATE 55: CPT | Performed by: FAMILY MEDICINE

## 2025-07-11 PROCEDURE — NC001 PR NO CHARGE: Performed by: THORACIC SURGERY (CARDIOTHORACIC VASCULAR SURGERY)

## 2025-07-11 PROCEDURE — 97166 OT EVAL MOD COMPLEX 45 MIN: CPT

## 2025-07-11 PROCEDURE — 85027 COMPLETE CBC AUTOMATED: CPT | Performed by: PHYSICIAN ASSISTANT

## 2025-07-11 PROCEDURE — 82948 REAGENT STRIP/BLOOD GLUCOSE: CPT

## 2025-07-11 PROCEDURE — 97163 PT EVAL HIGH COMPLEX 45 MIN: CPT

## 2025-07-11 PROCEDURE — 99238 HOSP IP/OBS DSCHRG MGMT 30/<: CPT | Performed by: PHYSICIAN ASSISTANT

## 2025-07-11 PROCEDURE — 93010 ELECTROCARDIOGRAM REPORT: CPT | Performed by: STUDENT IN AN ORGANIZED HEALTH CARE EDUCATION/TRAINING PROGRAM

## 2025-07-11 PROCEDURE — 93306 TTE W/DOPPLER COMPLETE: CPT | Performed by: INTERNAL MEDICINE

## 2025-07-11 PROCEDURE — 93306 TTE W/DOPPLER COMPLETE: CPT

## 2025-07-11 RX ORDER — CLOPIDOGREL BISULFATE 75 MG/1
75 TABLET ORAL DAILY
Qty: 90 TABLET | Refills: 0 | Status: SHIPPED | OUTPATIENT
Start: 2025-07-11 | End: 2025-10-09

## 2025-07-11 RX ORDER — MAGNESIUM SULFATE HEPTAHYDRATE 40 MG/ML
2 INJECTION, SOLUTION INTRAVENOUS ONCE
Status: COMPLETED | OUTPATIENT
Start: 2025-07-11 | End: 2025-07-11

## 2025-07-11 RX ORDER — ASPIRIN 81 MG/1
81 TABLET, CHEWABLE ORAL DAILY
Qty: 90 TABLET | Refills: 0 | Status: SHIPPED | OUTPATIENT
Start: 2025-07-11 | End: 2025-07-11

## 2025-07-11 RX ORDER — ACETAMINOPHEN 325 MG/1
650 TABLET ORAL EVERY 4 HOURS PRN
Status: DISCONTINUED | OUTPATIENT
Start: 2025-07-11 | End: 2025-07-11 | Stop reason: HOSPADM

## 2025-07-11 RX ORDER — PANTOPRAZOLE SODIUM 40 MG/1
40 TABLET, DELAYED RELEASE ORAL
Qty: 30 TABLET | Refills: 0 | Status: SHIPPED | OUTPATIENT
Start: 2025-07-12 | End: 2025-08-11

## 2025-07-11 RX ORDER — ASPIRIN 81 MG/1
81 TABLET, CHEWABLE ORAL DAILY
Qty: 90 TABLET | Refills: 0 | Status: SHIPPED | OUTPATIENT
Start: 2025-07-11 | End: 2025-10-09

## 2025-07-11 RX ORDER — ACETAMINOPHEN 325 MG/1
TABLET ORAL
COMMUNITY
Start: 2025-07-11

## 2025-07-11 RX ORDER — CLOPIDOGREL BISULFATE 75 MG/1
75 TABLET ORAL DAILY
Qty: 90 TABLET | Refills: 0 | Status: SHIPPED | OUTPATIENT
Start: 2025-07-11 | End: 2025-07-11

## 2025-07-11 RX ORDER — PANTOPRAZOLE SODIUM 40 MG/1
40 TABLET, DELAYED RELEASE ORAL
Qty: 30 TABLET | Refills: 0 | Status: SHIPPED | OUTPATIENT
Start: 2025-07-12 | End: 2025-07-11

## 2025-07-11 RX ORDER — POLYETHYLENE GLYCOL 3350 17 G/17G
17 POWDER, FOR SOLUTION ORAL DAILY PRN
Qty: 30 EACH | Refills: 0 | Status: SHIPPED | OUTPATIENT
Start: 2025-07-11 | End: 2025-07-11

## 2025-07-11 RX ORDER — POLYETHYLENE GLYCOL 3350 17 G/17G
17 POWDER, FOR SOLUTION ORAL DAILY PRN
Qty: 30 EACH | Refills: 0 | Status: SHIPPED | OUTPATIENT
Start: 2025-07-11 | End: 2025-08-10

## 2025-07-11 RX ORDER — OXYCODONE HYDROCHLORIDE 5 MG/1
TABLET ORAL
Qty: 30 TABLET | Refills: 0 | Status: CANCELLED | OUTPATIENT
Start: 2025-07-11 | End: 2025-07-21

## 2025-07-11 RX ADMIN — ATORVASTATIN CALCIUM 20 MG: 20 TABLET, FILM COATED ORAL at 17:38

## 2025-07-11 RX ADMIN — PANTOPRAZOLE SODIUM 40 MG: 40 TABLET, DELAYED RELEASE ORAL at 05:40

## 2025-07-11 RX ADMIN — ASPIRIN 81 MG CHEWABLE TABLET 81 MG: 81 TABLET CHEWABLE at 09:25

## 2025-07-11 RX ADMIN — FLUTICASONE FUROATE AND VILANTEROL TRIFENATATE 1 PUFF: 100; 25 POWDER RESPIRATORY (INHALATION) at 09:26

## 2025-07-11 RX ADMIN — DILTIAZEM HYDROCHLORIDE 180 MG: 180 CAPSULE, COATED, EXTENDED RELEASE ORAL at 09:25

## 2025-07-11 RX ADMIN — MAGNESIUM SULFATE HEPTAHYDRATE 2 G: 40 INJECTION, SOLUTION INTRAVENOUS at 07:28

## 2025-07-11 RX ADMIN — HYDRALAZINE HYDROCHLORIDE 10 MG: 20 INJECTION INTRAMUSCULAR; INTRAVENOUS at 04:04

## 2025-07-11 RX ADMIN — APIXABAN 5 MG: 5 TABLET, FILM COATED ORAL at 17:38

## 2025-07-11 RX ADMIN — APIXABAN 5 MG: 5 TABLET, FILM COATED ORAL at 09:25

## 2025-07-11 RX ADMIN — METOPROLOL SUCCINATE 25 MG: 25 TABLET, EXTENDED RELEASE ORAL at 09:25

## 2025-07-11 RX ADMIN — POLYETHYLENE GLYCOL 3350 17 G: 17 POWDER, FOR SOLUTION ORAL at 09:25

## 2025-07-11 RX ADMIN — CHLORHEXIDINE GLUCONATE 15 ML: 1.2 SOLUTION ORAL at 09:25

## 2025-07-11 RX ADMIN — CEFAZOLIN SODIUM 2000 MG: 2 SOLUTION INTRAVENOUS at 04:13

## 2025-07-11 RX ADMIN — ONDANSETRON 4 MG: 2 INJECTION INTRAMUSCULAR; INTRAVENOUS at 05:03

## 2025-07-11 RX ADMIN — MUPIROCIN 1 APPLICATION: 20 OINTMENT TOPICAL at 09:25

## 2025-07-11 NOTE — ASSESSMENT & PLAN NOTE
Most recent blood pressure 123/58  Currently on diltiazem 180 mg daily, metoprolol succinate 25 mg daily  Avoid hypotension  Management per primary

## 2025-07-11 NOTE — QUICK NOTE
Discharge was completed for Glendy Pete today.  Her medical reconciliation included continuation of her home Eliquis as well as aspirin.    Upon discharge instructions being delivered to the patient's family, the daughter informed us that she does not and has not taken Eliquis secondary to high cost.     The patient and her daughter were advised that Eliquis is preferable for atrial fibrillation prophylaxis and stroke prevention.  But they refused.     Review of inpatient ECG this AM shows NSR. Prior outpatient ECG also show NSR.    The possibility of Coumadin was discussed with the attending. As she was not systemically anticoagulated prior to this admission, we will modify her anticoagulation strategy to ASA/Plavix for discharge. We will subsequently discuss with cardiology regarding initiation of Coumadin as an outpatient.     Orville Navarrete PA-C  07/11/25  6:27 PM

## 2025-07-11 NOTE — PROGRESS NOTES
Progress Note - Cardiac Surgery   Glendy Pete 79 y.o. female MRN: [id number]  Unit/Bed#: PPHP-303-01 Encounter: [id number]    Severe AS S/P R TF TAVR 26mm MDT Evolut POD # 1    24 Hour Events: No events. NSR 70s-80s, cardene off last PM. On RA. SBP 140s. Hgb 10.8, Plts 196k, Cr 0.75. neuro/vasc intact. Groins sites stable. OOB to chair. Eating. No N/V/Abd pain. No CP/SOB. Feeling well. EKG SR 80. CXR clear. Echo pending.     Medications:   Scheduled Meds:  Current Facility-Administered Medications   Medication Dose Route Frequency Provider Last Rate    acetaminophen  650 mg Rectal Q4H PRN Orville Navarrete PA-C      acetaminophen  650 mg Oral Q4H PRN Orville Navarrete PA-C      albuterol  2 puff Inhalation Q6H PRN Orville Navarrete PA-C      apixaban  5 mg Oral BID Orville Navarrete PA-C      aspirin  81 mg Oral Daily Orville Navarrete PA-C      atorvastatin  20 mg Oral Daily With Dinner Orville Navarrete PA-C      bisacodyl  10 mg Rectal Daily PRN Orville Navarrete PA-C      calcium gluconate  2 g Intravenous Once PRN Orville Navarrete PA-C      cefazolin  2,000 mg Intravenous Q8H Orville Navarrete PA-C 2,000 mg (07/11/25 3583)    chlorhexidine  15 mL Mouth/Throat BID Orville Navarrete PA-C      diltiazem  180 mg Oral Daily Orville Navarrete PA-C      Fluticasone Furoate-Vilanterol  1 puff Inhalation Daily Orville Navarrete PA-C      hydrALAZINE  10 mg Intravenous Q6H PRN Orville Navarrete PA-C      insulin lispro  2-12 Units Subcutaneous TID AC Orville Navarrete PA-C      insulin lispro  2-12 Units Subcutaneous HS Orville Navarrete PA-C      metoprolol succinate  25 mg Oral Daily Orville Navarrete PA-C      mupirocin  1 Application Nasal Q12H ADRIENNE Orville Navarrete PA-C      niCARdipine  1-15 mg/hr Intravenous Continuous Conner Rosado MD Stopped (07/10/25 3027)    ondansetron  4 mg Intravenous Q6H PRN Orville Navarrete PA-C      oxyCODONE  2.5 mg Oral Q6H PRN Pura Gamboa PA-C      pantoprazole  40 mg Oral Early Morning Orville Navarrete,  SAMY      polyethylene glycol  17 g Oral Daily Orville Navarrete PA-C       Continuous Infusions:niCARdipine, 1-15 mg/hr, Last Rate: Stopped (07/10/25 2635)      PRN Meds:.  acetaminophen    acetaminophen    albuterol    bisacodyl    calcium gluconate    hydrALAZINE    ondansetron    oxyCODONE    Vitals:   Vitals:    25 0100 25 0500 25 0537 25 0600   BP: 120/59 145/65  150/67   Pulse: 62 86  79   Resp: (!) 34 15  16   Temp:       TempSrc:       SpO2: 94% 94%  92%   Weight:   58 kg (127 lb 14.4 oz)    Height:           Telemetry: NSR; Heart Rate: 80s    Respiratory:   SpO2: SpO2: 92 %; 2 LPM    Intake/Output:     Intake/Output Summary (Last 24 hours) at 2025 0656  Last data filed at 2025 0515  Gross per 24 hour   Intake 750 ml   Output 525 ml   Net 225 ml        Weights:   Weight (last 2 days)       Date/Time Weight    25 0537 58 (127.9)    07/10/25 1025 56.3 (124.01)          Admit weight: 124    Results:   Results from last 7 days   Lab Units 25  0412 07/10/25  1431 07/10/25  1404   WBC Thousand/uL 8.68  --   --    HEMOGLOBIN g/dL 10.8* 11.5  --    I STAT HEMOGLOBIN g/dl  --   --  10.2*   HEMATOCRIT % 32.8* 34.6*  --    HEMATOCRIT, ISTAT %  --   --  30*   PLATELETS Thousands/uL 196 189  --      Results from last 7 days   Lab Units 25  0409 07/10/25  1431 07/10/25  1404   POTASSIUM mmol/L 3.8 3.6  --    CHLORIDE mmol/L 106 109*  --    CO2 mmol/L 24 25  --    CO2, I-STAT mmol/L  --   --  23   BUN mg/dL 17 18  --    CREATININE mg/dL 0.75 0.77  --    GLUCOSE, ISTAT mg/dl  --   --  102   CALCIUM mg/dL 8.7 8.8  --          Recent Labs     25  0409   MG 1.7*     Point of care glucose: 108-118    Studies:    EC/11  NSR 80s    CXR:   Clear, no PTX/pl effusions    Echocardiogram:   pending    Results Review Statement: I personally reviewed the following image studies in PACS and associated radiology reports: EKG and chest xray. My interpretation of the  radiology images/reports is: same as above.    Invasive Lines/Tubes:  Invasive Devices       Central Venous Catheter Line  Duration             CVC Central Lines 07/10/25 <1 day                    Physical Exam:    General: No acute distress, Alert, and Normal appearance  HEENT/NECK:  Normocephalic. Atraumatic.  No jugular venous distention.    Cardiac: Regular rate and rhythm and No murmurs/rubs/gallops  Pulmonary:  Breath sounds clear bilaterally  Abdomen:  Non-tender, Non-distended, and Normal bowel sounds  Incisions: Groin puncture sites clean, dry, and intact without hematoma  Extremities: Extremities warm/dry and No edema B/L  Neuro: Alert and oriented X 3 and No focal deficits  Skin: Warm/Dry, without rashes or lesions.    Assessment:  Principal Problem:    Severe aortic stenosis  Active Problems:    Hypothyroid    Primary hypertension    Pacemaker    Carotid stenosis, asymptomatic, left    Stage 3a chronic kidney disease (HCC)    Paroxysmal atrial fibrillation (HCC)    S/P TAVR (transcatheter aortic valve replacement)       Severe AS S/P R TF TAVR 26mm MDT Evolut POD # 1    Plan:    Cardiac:     Normal ventricular systolic function, EF 60%    NSR; with PAF, HR controlled, 70s-80s      HTN Regimen:  Continue home toprol XL 25mg daily  Continue home cardizem CD 180mg daily    TAVR anticoagulation regimen:   ASA/Eliquis (new to ASA)  Systemic anticoagulation indicated for PAF      Postoperative transthoracic echocardiogram:  Echo to be completed today    Continue Statin therapy    Central IV access no longer required; Remove central venous catheter today    Continue Eliquis for DVT prophylaxis    Pulmonary:     Good Room air oxygen saturation; Continue incentive spirometry/Coughing/Deep breathing exercises    Renal:     Normal preoperative renal function    Intake/Output net: +225 mL/24 hours    Diuretic Regimen:  Euvolemic, normal LVEF, not on diuretics preop  No diuretics needed    Neuro:    Neurologically  intact; No active issues     Incisional pain well-controlled; Continue prn Tylenol    GI:    Cardiac diet, with 1800 mL fluid restriction    Tolerating diet without complaint    Continue stool softeners and prn suppository    Continue GI prophylaxis    Endo:     Glucose well-controlled with insulin sliding scale coverage    7.  Hematology:     Post-operative acute blood loss anemia; Hemoglobin 10.8; trend prn  - stable    8.  Disposition:    Gerontology consultation ordered for routine assessment of TAVR patient over 65 years old    Anticipated discharge date: likely today pending echo, voiding.       VTE Pharmacologic Prophylaxis: VTE covered by:  apixaban, Oral     VTE Mechanical Prophylaxis: sequential compression device    Collaborative rounds completed with supervising physician  Plan of care discussed with bedside nurse    SIGNATURE: Braden Marlow PA-C  DATE: July 11, 2025  TIME: 6:56 AM

## 2025-07-11 NOTE — PLAN OF CARE
Problem: PHYSICAL THERAPY ADULT  Goal: Performs mobility at highest level of function for planned discharge setting.  See evaluation for individualized goals.  Description: Treatment/Interventions: Functional transfer training, LE strengthening/ROM, Elevations, Therapeutic exercise, Endurance training, Patient/family training, Bed mobility, Gait training, Spoke to nursing, OT          See flowsheet documentation for full assessment, interventions and recommendations.  Note: Prognosis: Good  Problem List: Decreased endurance, Impaired balance, Decreased mobility, Orthopedic restrictions  Assessment: Pt is an 79 y.o. female presenting to [facility] on 7/10/25 for primary medical dx of severe aortic stenosis. Pt currently POD 1 TAVR. Pt  has a past medical history of Asthma, Carotid artery stenosis, Carotid stenosis, asymptomatic, left, CKD (chronic kidney disease), stage III (HCC), History of colon cancer, History of melanoma excision, History of nonadherence to medical treatment, History of syncope, [place] (hard of hearing), Hypertension, Hypothyroidism, PAF (paroxysmal atrial fibrillation) ([facility]), and Severe aortic stenosis.  Pt presents as a high complexity evaluation due to Ongoing medical management for primary dx, Increased reliance on more restrictive AD compared to baseline, Decreased activity tolerance compared to baseline, Fall risk, Increased assistance needed from caregiver at current time, Ongoing telemetry monitoring, Diagnostic imaging pending, Continuous pulse oximetry monitoring , s/p surgical intervention. Pt currently requires min A for bed mobility, S for transfers with RW and CGA-CS for ambulating 100'x2 with RW. Pt is limited by deficits in mobility, endurance, balance and activity tolerance limiting their ability to be fully independent at this time. Pt would benefit from continued skilled acute care PT services to address impairments and promote functional independence. Anticipate no needs upon  discharge. Pt has supportive family to assist as needed. The patient's AM-PAC Basic Mobility Inpatient Short Form Raw Score is 17. A Raw score of greater than or equal to 16 suggests the patient may benefit from discharge to home. Please also refer to the recommendation of the Physical Therapist for safe discharge planning. Pt left upright in chair with chair alarm donned, call bell and personal items within reach and all needs met.  Barriers to Discharge: None     Rehab Resource Intensity Level, PT: No post-acute rehabilitation needs    See flowsheet documentation for full assessment.

## 2025-07-11 NOTE — PHYSICAL THERAPY NOTE
Physical Therapy Evaluation    Patient Name: Glendy Pete    Today's Date: 7/11/2025     Problem List  Principal Problem:    Severe aortic stenosis  Active Problems:    Hypothyroid    Primary hypertension    Pacemaker    Carotid stenosis, asymptomatic, left    Stage 3a chronic kidney disease (HCC)    Paroxysmal atrial fibrillation (HCC)    S/P TAVR (transcatheter aortic valve replacement)       Past Medical History  Past Medical History[1]     Past Surgical History  Past Surgical History[2]      07/11/25 0917   PT Last Visit   PT Visit Date 07/11/25   Note Type   Note type Evaluation   Pain Assessment   Pain Assessment Tool 0-10   Pain Score No Pain   Restrictions/Precautions   Weight Bearing Precautions Per Order No   Other Precautions Cardiac/sternal;Chair Alarm;Bed Alarm;Multiple lines;Telemetry;Fall Risk   Home Living   Type of Home Apartment   Home Layout One level;Performs ADLs on one level  (0 KAR[address]. basement apartment of brother and MITCH house)   Bathroom Shower/Tub Walk-in shower   Bathroom Toilet Standard   Bathroom Equipment Grab bars in shower   Bathroom Accessibility Accessible   Home Equipment   (denies)   Additional Comments pt lives in one level apartment with 0 KAREN   Prior Function   Level of [place] Independent with ADLs;Independent with functional mobility;Independent with IADLS   Lives With Family  (brother and MITCH live upstairs (both work during the day))   Receives Help From Family   IADLs Independent with driving;Independent with meal prep;Independent with medication management   Falls in the last 6 months 0   Vocational Retired   Comments pt reports independence PTA   General   Family/Caregiver Present No   Cognition   Overall Cognitive Status WFL   Arousal/Participation Responsive   Attention Within functional limits   Orientation Level Oriented X4   Memory Decreased recall of precautions   Following Commands Follows all commands and  directions without difficulty   Comments pt very pleasant and cooperative   Subjective   Subjective pt agreeable to mobilize   RLE Assessment   RLE Assessment WFL   LLE Assessment   LLE Assessment WFL   Bed Mobility   Supine to Sit 4  Minimal assistance   Additional items Assist x 1;HOB elevated;Increased time required;Verbal cues   Sit to Supine Unable to assess   Additional Comments pt OOB in chair at end of session   Transfers   Sit to Stand 5  Supervision   Additional items Assist x 1;Increased time required;Verbal cues   Stand to Sit 5  Supervision   Additional items Assist x 1;Armrests   Additional Comments c RW   Ambulation/Elevation   Gait pattern Inconsistent bhavesh;Short stride   Gait Assistance 4  Minimal assist  (CGA initially progressing to CS)   Additional items Assist x 1;Verbal cues   Assistive Device Rolling walker   Distance 100'x2   Stair Management Assistance Not tested   Ambulation/Elevation Additional Comments no overt LOB   Balance   Static Sitting Good   Dynamic Sitting Fair +   Static Standing Fair   Dynamic Standing Fair   Ambulatory Fair -   Endurance Deficit   Endurance Deficit Yes   Endurance Deficit Description pt limited by decreased activity tolerance   Activity Tolerance   Activity Tolerance Patient tolerated treatment well   Medical Staff Made Aware ADDIE Sparks   Nurse Made Aware yes-RN cleared   Assessment   Prognosis Good   Problem List Decreased endurance;Impaired balance;Decreased mobility;Orthopedic restrictions   Assessment Pt is an 79 y.o. female presenting to [facility] on 7/10/25 for primary medical dx of severe aortic stenosis. Pt currently POD 1 TAVR. Pt  has a past medical history of Asthma, Carotid artery stenosis, Carotid stenosis, asymptomatic, left, CKD (chronic kidney disease), stage III (HCC), History of colon cancer, History of melanoma excision, History of nonadherence to medical treatment, History of syncope, [place] (hard of hearing), Hypertension,  Hypothyroidism, PAF (paroxysmal atrial fibrillation) (HCC), and Severe aortic stenosis.  Pt presents as a high complexity evaluation due to Ongoing medical management for primary dx, Increased reliance on more restrictive AD compared to baseline, Decreased activity tolerance compared to baseline, Fall risk, Increased assistance needed from caregiver at current time, Ongoing telemetry monitoring, Diagnostic imaging pending, Continuous pulse oximetry monitoring , s/p surgical intervention. Pt currently requires min A for bed mobility, S for transfers with RW and CGA-CS for ambulating 100'x2 with RW. Pt is limited by deficits in mobility, endurance, balance and activity tolerance limiting their ability to be fully independent at this time. Pt would benefit from continued skilled acute care PT services to address impairments and promote functional independence. Anticipate no needs upon discharge. Pt has supportive family to assist as needed. The patient's AM-PAC Basic Mobility Inpatient Short Form Raw Score is 17. A Raw score of greater than or equal to 16 suggests the patient may benefit from discharge to home. Please also refer to the recommendation of the Physical Therapist for safe discharge planning. Pt left upright in chair with chair alarm donned, call bell and personal items within reach and all needs met.   Barriers to Discharge None   Goals   Patient Goals to go home   STG Expiration Date 07/25/25   Short Term Goal #1 In 14 days pt will complete bed mobility and transfers I to promote independence and safety. Pt will ambulate >300' I to promote safe access to home and community. Pt will negotiate at lease 3 steps at mod I  to promote safe access to community.   PT Treatment Day 0   Plan   Treatment/Interventions Functional transfer training;LE strengthening/ROM;Elevations;Therapeutic exercise;Endurance training;Patient/family training;Bed mobility;Gait training;Spoke to nursing;OT   PT Frequency 3-5x/wk    Discharge Recommendation   Rehab Resource Intensity Level, PT No post-acute rehabilitation needs   AM-PAC Basic Mobility Inpatient   Turning in Flat Bed Without Bedrails 3   Lying on Back to Sitting on Edge of Flat Bed Without Bedrails 3   Moving Bed to Chair 3   Standing Up From Chair Using Arms 3   Walk in Room 3   Climb 3-5 Stairs With Railing 2   Basic Mobility Inpatient Raw Score 17   Basic Mobility Standardized Score 39.67   [facility] Highest Level Of Mobility   -HL Goal 5: Stand one or more mins   -HLM Achieved 7: Walk 25 feet or more   Modified Melba Scale   Modified Melba Scale 3   Karma Winters DPT         [1]   Past Medical History:  Diagnosis Date    Asthma     Carotid artery stenosis     left asymptomatic 75-80%, right asymptomatic 40-45%    Carotid stenosis, asymptomatic, left     70%    CKD (chronic kidney disease), stage III (HCC)     baseline Cr 0.8-1.0    History of colon cancer     s/p resection & chemotherapy    History of melanoma excision     History of nonadherence to medical treatment     History of syncope     s/p SJM PPM    [place] (hard of hearing)     no aids    Hypertension     Hypothyroidism     PAF (paroxysmal atrial fibrillation) (HCC)     prior Flecanide, Cardizem, Eliquis    Severe aortic stenosis    [2]   Past Surgical History:  Procedure Laterality Date    CARDIAC CATHETERIZATION N/A 8/23/2024    Procedure: Cardiac Coronary Angiogram;  Surgeon: Olga Lidia Handley DO;  Location: [facility];  Service: Cardiology    CARDIAC CATHETERIZATION  8/23/2024    Procedure: Cardiac catheterization;  Surgeon: Olga Lidia Handley DO;  Location: [facility];  Service: Cardiology    CARDIAC PACEMAKER PLACEMENT  2020    [facility]    COLON SURGERY  1992    colon resection with chemo    CRANIOTOMY Right 1989    R bleed-from fall and hit head    SKIN CANCER EXCISION Right     Melanoma R arm

## 2025-07-11 NOTE — RESTORATIVE TECHNICIAN NOTE
Restorative Technician Note      Patient Name: Glendy Pete     Restorative Tech Visit Date: 07/11/25  Note Type: Mobility  Patient Position Upon Consult: Bedside chair  Activity Performed: Ambulated  Assistive Device: Roller walker  Patient Position at End of Consult: All needs within reach; Bed/Chair alarm activated; Supine; Other (comment) (for echo)

## 2025-07-11 NOTE — ASSESSMENT & PLAN NOTE
Currently rate controlled and asymptomatic  On Eliquis 5 mg twice daily for anticoagulation  Other medications include diltiazem  Management per primary

## 2025-07-11 NOTE — PLAN OF CARE
Problem: Prexisting or High Potential for Compromised Skin Integrity  Goal: Skin integrity is maintained or improved  Description: INTERVENTIONS:  - Identify patients at risk for skin breakdown  - Assess and monitor skin integrity including under and around medical devices   - Assess and monitor nutrition and hydration status  - Monitor labs  - Assess for incontinence   - Turn and reposition patient  - Assist with mobility/ambulation  - Relieve pressure over ulysses prominences   - Avoid friction and shearing  - Provide appropriate hygiene as needed including keeping skin clean and dry  - Evaluate need for skin moisturizer/barrier cream  - Collaborate with interdisciplinary team  - Patient/family teaching  - Consider wound care consult    Assess:  - Review Dwain scale daily  - Inspect skin when repositioning, toileting, and assisting with ADLS  - Assess extremities for adequate circulation and sensation     Bed Management:  - Have minimal linens on bed & keep smooth, unwrinkled  - Change linens as needed when moist or perspiring degrees   - Toileting:  - Offer bedside commode    Activity:  - Encourage activity and walks on unit  - Encourage or provide ROM exercises   - Turn and reposition patient every 2 Hours  - Use appropriate equipment to lift or move patient in bed    Skin Care:  - Avoid use of baby powder, tape, friction and shearing, hot water or constrictive clothing  - Do not massage red bony areas    Outcome: Progressing

## 2025-07-11 NOTE — UTILIZATION REVIEW
Initial Clinical Review    Elective Inpatient surgical procedure  Age/Sex: 79 y.o. female  Surgery Date: 7/10/25  Procedure: Transcatheter aortic valve replacement with a 26 mm Medtronic Evolut FX+ bioprosthetic valve via right transfemoral approach.   Anesthesia: general endotracheal anesthesia with transesophageal echocardiogram guidance   Operative Findings: 1. Intraoperative transesophageal echocardiogram revealed severe aortic stenosis.  2. Final transesophageal echocardiogram demonstrated prosthetic valve with normal function no perivalvular leak.      POD#1 Progress Note:  No events. NSR 70s-80s, cardene off last PM. On RA. SBP 140s. Hgb 10.8, Plts 196k, Cr 0.75. neuro/vasc intact. Groins sites stable. OOB to chair. Eating. No N/V/Abd pain. No CP/SOB. Feeling well. EKG SR 80. CXR clear. Echo pending. Groin puncture sites clean, dry, and intact without hematoma.    Plan: Continue post op cardiac meds, fu on Echo, continue pain control, GI PPX, accuchecks w/ SSI.     Admission Orders: Date/Time/Statement:   Admission Orders (From admission, onward)       Ordered        07/10/25 1017  Inpatient Admission  Once                          Orders Placed This Encounter   Procedures    Inpatient Admission     Standing Status:   Standing     Number of Occurrences:   1     Level of Care:   Level 1 Stepdown [13]     Estimated length of stay:   More than 2 Midnights     Certification:   I certify that inpatient services are medically necessary for this patient for a duration of greater than two midnights. See H&P and MD Progress Notes for additional information about the patient's course of treatment.     Diet: Cardiac diet, with 1800 mL fluid restriction   Mobility: OOB and ambulatory  DVT Prophylaxis: scd, ambulation    Medications/Pain Control:   Scheduled Medications:  apixaban, 5 mg, Oral, BID  aspirin, 81 mg, Oral, Daily  atorvastatin, 20 mg, Oral, Daily With Dinner  cefazolin, 2,000 mg, Intravenous,  Q8H  chlorhexidine, 15 mL, Mouth/Throat, BID  diltiazem, 180 mg, Oral, Daily  Fluticasone Furoate-Vilanterol, 1 puff, Inhalation, Daily  insulin lispro, 2-12 Units, Subcutaneous, TID AC  insulin lispro, 2-12 Units, Subcutaneous, HS  metoprolol succinate, 25 mg, Oral, Daily  mupirocin, 1 Application, Nasal, Q12H ADRIENNE  pantoprazole, 40 mg, Oral, Early Morning  polyethylene glycol, 17 g, Oral, Daily      Continuous IV Infusions: none     PRN Meds:  acetaminophen, 650 mg, Rectal, Q4H PRN  acetaminophen, 650 mg, Oral, Q4H PRN  albuterol, 2 puff, Inhalation, Q6H PRN  bisacodyl, 10 mg, Rectal, Daily PRN  calcium gluconate, 2 g, Intravenous, Once PRN  hydrALAZINE, 10 mg, Intravenous, Q6H PRN  ondansetron, 4 mg, Intravenous, Q6H PRN  oxyCODONE, 2.5 mg, Oral, Q6H PRN      Vital Signs (last 3 days)       Date/Time Temp Pulse Resp BP MAP (mmHg) Arterial Line BP MAP SpO2 Calculated FIO2 (%) - Nasal Cannula Nasal Cannula O2 Flow Rate (L/min) O2 Device Cardiac (WDL) Vijay Coma Scale Score Pain    07/11/25 0840 -- 71 -- 123/58 -- -- -- -- -- -- -- -- -- --    07/11/25 0815 -- 71 29 123/58 83 -- -- 95 % -- -- -- -- -- --    07/11/25 0800 -- -- -- -- -- -- -- -- -- -- -- -- 15 No Pain    07/11/25 0745 -- 76 22 134/62 89 -- -- 92 % -- -- -- -- -- --    07/11/25 0700 -- 77 23 143/62 89 -- -- 92 % -- -- -- -- -- --    07/11/25 0645 98.4 °F (36.9 °C) -- -- -- -- -- -- -- -- -- -- -- -- --    07/11/25 0600 -- 79 16 150/67 96 -- -- 92 % -- -- -- -- 15 --    07/11/25 0500 -- 86 15 145/65 94 -- -- 94 % -- -- -- -- 15 --    07/11/25 0400 -- -- -- -- -- -- -- -- -- -- -- -- 15 --    07/11/25 0300 -- -- -- -- -- -- -- -- -- -- -- -- 15 --    07/11/25 0200 -- -- -- -- -- -- -- -- -- -- -- -- 15 --    07/11/25 0100 -- 62 34 120/59 85 123/38 65 mmHg 94 % -- -- -- -- 15 --    07/11/25 0000 -- 61 32 117/56 81 113/38 63 mmHg 92 % 28 2 L/min Nasal cannula -- 15 --    07/10/25 2300 -- 70 37 125/60 86 117/40 66 mmHg 91 % -- -- -- -- 15 --    07/10/25  2200 -- 69 28 116/57 82 119/41 66 mmHg 91 % -- -- -- -- 15 --    07/10/25 2100 -- -- -- -- -- -- -- -- -- -- -- -- 15 --    07/10/25 2000 -- -- -- -- -- -- -- -- -- -- -- -- 15 No Pain    07/10/25 1910 97.8 °F (36.6 °C) -- -- -- -- -- -- -- -- -- -- -- -- --    07/10/25 1900 -- 115 22 -- -- 116/46 70 mmHg 89 % -- -- -- -- 15 --    07/10/25 1830 -- 117 16 -- -- 118/48 73 mmHg 92 % -- -- -- -- -- --    07/10/25 1815 -- 120 14 -- -- 120/48 72 mmHg 92 % -- -- -- -- -- --    07/10/25 1800 -- 116 18 -- -- 112/45 68 mmHg 94 % -- -- -- -- -- --    07/10/25 1745 -- 113 16 -- -- 116/47 71 mmHg 94 % -- -- -- -- -- --    07/10/25 1730 97.2 °F (36.2 °C) 119 18 -- -- 111/46 69 mmHg 95 % -- -- -- -- 15 7    07/10/25 1715 -- 108 36 -- -- 121/57 78 mmHg 93 % -- -- -- -- -- --    07/10/25 1630 -- 114 21 129/58 83 117/54 75 mmHg 98 % -- -- Nasal cannula -- 15 --    07/10/25 1617 -- -- -- -- -- -- -- -- -- -- -- -- -- 4    07/10/25 1600 -- 102 14 114/57 82 129/56 81 mmHg 100 % -- -- Nasal cannula -- 15 No Pain    07/10/25 1530 -- 97 14 98/60 73 118/53 75 mmHg 100 % 28 2 L/min Nasal cannula -- 15 No Pain    07/10/25 1515 -- 108 14 110/56 81 114/54 75 mmHg 100 % 28 2 L/min Nasal cannula -- 15 No Pain    07/10/25 1500 -- 89 13 114/55 79 114/52 74 mmHg 100 % 28 2 L/min Nasal cannula -- 15 No Pain    07/10/25 1445 -- 91 14 108/59 75 114/52 73 mmHg 100 % 28 2 L/min Nasal cannula -- 15 No Pain    07/10/25 1430 -- 102 16 -- -- 116/57 78 mmHg 100 % 28 2 L/min Nasal cannula -- 15 No Pain    07/10/25 1416 97.1 °F (36.2 °C) 91 15 -- -- 106/63 77 mmHg 100 % 28 2 L/min Nasal cannula X 14 No Pain    07/10/25 1055 -- -- -- 146/77 -- -- -- -- -- -- -- -- -- --    07/10/25 1025 -- -- -- -- -- -- -- -- -- -- -- -- -- No Pain    07/10/25 1011 96.9 °F (36.1 °C) 116 18 189/115 -- -- -- 98 % -- -- None (Room air) -- -- --          Weight (last 2 days)       Date/Time Weight    07/11/25 0840 57.6 (127)    07/11/25 0537 58 (127.9)    07/10/25 1025 56.3  (124.01)            Pertinent Labs/Diagnostic Test Results:   Radiology:  XR chest portable    (Results Pending)   XR chest portable    (Results Pending)     Cardiology:  Echo complete w/ contrast if indicated    by Kasey Escobar (07/11 0925)      MARILYN intraop interventional w/realtime guidance of cardiac procedures   Final Result by SYSTEMGENERATED, DOCUMENTATION (07/11 0739)   This order contains the linked images for the MARILYN that was performed by    the Anesthesiologist.  Please see the  CARDIAC MARILYN ANESTHESIA procedure    for results.      ECG 12 lead    by Interface, Ris Results In (07/11 0722)        GI:  No orders to display           Results from last 7 days   Lab Units 07/11/25  0412 07/10/25  1431 07/10/25  1404 07/10/25  1351 07/10/25  1309   WBC Thousand/uL 8.68  --   --   --   --    HEMOGLOBIN g/dL 10.8* 11.5  --   --   --    I STAT HEMOGLOBIN g/dl  --   --  10.2* 10.9* 11.6   HEMATOCRIT % 32.8* 34.6*  --   --   --    HEMATOCRIT, ISTAT %  --   --  30* 32* 34*   PLATELETS Thousands/uL 196 189  --   --   --          Results from last 7 days   Lab Units 07/11/25  0409 07/10/25  1431 07/10/25  1404 07/10/25  1351 07/10/25  1309   SODIUM mmol/L 141 140  --   --   --    POTASSIUM mmol/L 3.8 3.6  --   --   --    CHLORIDE mmol/L 106 109*  --   --   --    CO2 mmol/L 24 25  --   --   --    CO2, I-STAT mmol/L  --   --  23 24 26   ANION GAP mmol/L 11 6  --   --   --    BUN mg/dL 17 18  --   --   --    CREATININE mg/dL 0.75 0.77  --   --   --    EGFR ml/min/1.73sq m 76 73  --   --   --    CALCIUM mg/dL 8.7 8.8  --   --   --    CALCIUM, IONIZED, ISTAT mmol/L  --   --  1.16 1.16 1.26   MAGNESIUM mg/dL 1.7*  --   --   --   --          Results from last 7 days   Lab Units 07/11/25  0542 07/10/25  2103 07/10/25  1751   POC GLUCOSE mg/dl 116 108 118     Results from last 7 days   Lab Units 07/11/25  0409 07/10/25  1431   GLUCOSE RANDOM mg/dL 84 107     Results from last 7 days   Lab Units 07/10/25  1404 07/10/25  1351  07/10/25  1309   PH, CLAUDE I-STAT   --   --  7.321   PCO2, CLAUDE ISTAT mm HG  --   --  47.8   PO2, CLAUDE ISTAT mm HG  --   --  45.0   HCO3, CLAUDE ISTAT mmol/L  --   --  24.7   I STAT BASE EXC mmol/L -4* -1 -2   I STAT O2 SAT % 100* 100* 77   ISTAT PH ART  7.360 7.412  --    I STAT ART PCO2 mm HG 38.0 35.9*  --    I STAT ART PO2 mm .0* 316.0*  --    I STAT ART HCO3 mmol/L 21.4* 22.9  --      Results from last 7 days   Lab Units 07/10/25  1434   UNIT PRODUCT CODE  [id number]  [id number]  [id number]  [id number]   UNIT NUMBER  [id number]  [id number]  [id number]  [id number]   UNITABO  O  O  O  O   UNITRH  POS  POS  POS  POS   CROSSMATCH  Compatible  Compatible  Compatible  Compatible   UNIT DISPENSE STATUS  Crossmatched  Crossmatched  Crossmatched  Crossmatched   UNIT PRODUCT VOL mL 350  350  350  350     Network Utilization Review Department  ATTENTION: Please call with any questions or concerns to [phone] and carefully listen to the prompts so that you are directed to the right person. All voicemails are confidential.   For Discharge needs, contact Care Management DC Support Team at [phone] opt. [phone]  Send all requests for admission clinical reviews, approved or denied determinations and any other requests to dedicated fax number below belonging to the [facility] where the patient is receiving treatment. List of dedicated fax numbers for the Facilities:  FACILITY NAME UR FAX NUMBER   ADMISSION DENIALS (Administrative/Medical Necessity) [phone]   DISCHARGE SUPPORT TEAM (NETWORK) [phone]   PARENT CHILD HEALTH (Maternity/NICU/Pediatrics) [phone]   [facility] [phone]   [facility] [phone]   [facility] [phone]   [facility] [phone]   [facility] [phone]   [facility] [phone]   [facility]  [facility] [phone]   [facility] [phone]   [facility] [phone]   [facility] [phone]   [facility] [phone]   [facility] [phone]

## 2025-07-11 NOTE — RESPIRATORY THERAPY NOTE
Respiratory protocol   07/11/25 1532   Respiratory Assessment   Resp Comments pt performimg IS on own and near goal. no other respiratory  intervention  at this time  will d/c from respiratory  protocol

## 2025-07-11 NOTE — OCCUPATIONAL THERAPY NOTE
"    Occupational Therapy Evaluation     Patient Name: Glendy Pete  Today's Date: 7/11/2025  Problem List  Principal Problem:    Severe aortic stenosis  Active Problems:    Hypothyroid    Primary hypertension    Pacemaker    Carotid stenosis, asymptomatic, left    Stage 3a chronic kidney disease (HCC)    Paroxysmal atrial fibrillation (HCC)    S/P TAVR (transcatheter aortic valve replacement)    Past Medical History  Past Medical History[1]  Past Surgical History  Past Surgical History[2]      07/11/25 0916   OT Last Visit   OT Visit Date 07/11/25   Note Type   Note type Evaluation   Pain Assessment   Pain Assessment Tool 0-10   Pain Score No Pain   Restrictions/Precautions   Weight Bearing Precautions Per Order No   Other Precautions Cardiac/sternal;Chair Alarm;Bed Alarm;Telemetry;Fall Risk;Multiple lines   Home Living   Type of Home Apartment   Home Layout One level;Performs ADLs on one level   Bathroom Shower/Tub Walk-in shower   Bathroom Toilet Standard   Bathroom Equipment Grab bars in shower   Bathroom Accessibility Accessible   Additional Comments Pt reports living alone in one-level basement apt. of her brothers house, w/ 0 KAREN, walk-in shower, standard toilet.   Prior Function   Level of [place] Independent with ADLs;Independent with functional mobility;Independent with IADLS   Lives With Alone;Other (Comment)  (basement apt.; brother and sister in law live upstairs.)   Receives Help From Family   IADLs Independent with driving;Independent with meal prep;Independent with medication management   Falls in the last 6 months 0   Vocational Retired   Lifestyle   Autonomy Pta pt reports being independent in ADLs, IADLs, and functional mobility, (+) .   Reciprocal Relationships brother   Service to Others retired    Intrinsic Gratification wants to keep busy   General   Family/Caregiver Present No   Subjective   Subjective \"I want to do more\"   ADL   Where Assessed Chair   Eating Assistance " 6  Modified independent   Grooming Assistance 6  Modified Independent   UB Bathing Assistance 6  Modified Independent   LB Bathing Assistance 5  Supervision/Setup   UB Dressing Assistance 6  Modified independent   LB Dressing Assistance 5  Supervision/Setup   Toileting Assistance  5  Supervision/Setup   Functional Assistance 5  Supervision/Setup   Additional Comments Pt reports her brother can assist at times if needed when not at work.   Bed Mobility   Supine to Sit 4  Minimal assistance   Additional items Assist x 1;Verbal cues   Additional Comments Pt greeted supine in bed, required Min A w/ HHA to sit EOB, left OOB in chair with alarm on and all needs within reach.   Transfers   Sit to Stand 5  Supervision   Additional items Verbal cues   Stand to Sit 5  Supervision   Additional items Verbal cues   Additional Comments w/ RW   Functional Mobility   Functional Mobility 5  Supervision   Additional Comments Pt performed household distances w/ Supervision w/ RW.   Additional items Rolling walker   Balance   Static Sitting Good   Dynamic Sitting Fair +   Static Standing Fair   Dynamic Standing Fair   Ambulatory Fair -   Activity Tolerance   Activity Tolerance Patient tolerated treatment well   Medical Staff Made Aware Co-eval with PT due to medical complexity, OTR present.   Nurse Made Aware RN cleared for therapy.   RUE Assessment   RUE Assessment WFL   LUE Assessment   LUE Assessment WFL   Hand Function   Gross Motor Coordination Functional   Fine Motor Coordination Functional   Sensation   Light Touch No apparent deficits   Cognition   Overall Cognitive Status WFL   Arousal/Participation Alert;Cooperative   Attention Within functional limits   Orientation Level Oriented X4   Memory Decreased recall of precautions   Following Commands Follows all commands and directions without difficulty   Comments Pt pleasant and cooperative to therapy, some decreased recall of precautions although provided education at end of  session, pt verbalized understanding.   Assessment   Prognosis Good   Assessment Pt is a 79 y.o. female who was admitted to [facility] on 7/10/2025 with Severe aortic stenosis, s/p TAVR. Pt seen for an OT evaluation per active OT orders.  Pt  has a past medical history of Asthma, Carotid artery stenosis, Carotid stenosis, asymptomatic, left, CKD (chronic kidney disease), stage III (HCC), History of colon cancer, History of melanoma excision, History of nonadherence to medical treatment, History of syncope, [place] (hard of hearing), Hypertension, Hypothyroidism, PAF (paroxysmal atrial fibrillation) (HCC), and Severe aortic stenosis. Pt reports living alone in one-level basement apt. of her brothers house, w/ 0 KAREN, walk-in shower, standard toilet. Pta, pt was independent w/ ADL, IADL, and functional mobility, was not using any DME at baseline, and is (+) . Currently, pt is Mod I for UB ADL, Supervision for LB ADL, and performed transfers/functional mobility w/ Supervision w/ RW.   The patient's raw score on the AM-PAC Daily Activity Inpatient Short Form is 21. A raw score of greater than or equal to 19 suggests the patient may benefit from discharge to home. Please refer to the recommendation of the Occupational Therapist for safe discharge planning. Pt is likely close to functional baseline and has support at home if needed, indicating no further acute OT needs at this time, D/C acute OT. From OT standpoint, recommend pt to return home with increased social support and no further OT needs upon D/C. Pt was left seated in bedside chair with chair alarm on and all needs within reach.   Goals   Patient Goals to go home   Plan   OT Frequency Eval only   Discharge Recommendation   Rehab Resource Intensity Level, OT No post-acute rehabilitation needs   AM-PAC Daily Activity Inpatient   Lower Body Dressing 3   Bathing 3   Toileting 3   Upper Body Dressing 4   Grooming 4   Eating 4   Daily Activity Raw Score 21    Daily Activity Standardized Score (Calc for Raw Score >=11) 44.27   AM-PAC Applied Cognition Inpatient   Following a Speech/Presentation 4   Understanding Ordinary Conversation 4   Taking Medications 4   Remembering Where Things Are Placed or Put Away 4   Remembering List of 4-5 Errands 4   Taking Care of Complicated Tasks 4   Applied Cognition Raw Score 24   Applied Cognition Standardized Score 62.21   End of Consult   Education Provided Yes  (Pt provided Recovering from Minimally Invasive Cardiac Surgery packet.)   Patient Position at End of Consult Bedside chair;Bed/Chair alarm activated;All needs within reach   Nurse Communication Nurse aware of consult       ADDIE Lovelace         [1]   Past Medical History:  Diagnosis Date    Asthma     Carotid artery stenosis     left asymptomatic 75-80%, right asymptomatic 40-45%    Carotid stenosis, asymptomatic, left     70%    CKD (chronic kidney disease), stage III (HCC)     baseline Cr 0.8-1.0    History of colon cancer     s/p resection & chemotherapy    History of melanoma excision     History of nonadherence to medical treatment     History of syncope     s/p SJM PPM    [place] (hard of hearing)     no aids    Hypertension     Hypothyroidism     PAF (paroxysmal atrial fibrillation) (HCC)     prior Flecanide, Cardizem, Eliquis    Severe aortic stenosis    [2]   Past Surgical History:  Procedure Laterality Date    CARDIAC CATHETERIZATION N/A 8/23/2024    Procedure: Cardiac Coronary Angiogram;  Surgeon: Olga Lidia Handley DO;  [facility];  Service: Cardiology    CARDIAC CATHETERIZATION  8/23/2024    Procedure: Cardiac catheterization;  Surgeon: Olga Lidia Handley DO;  [facility];  Service: Cardiology    CARDIAC CATHETERIZATION N/A 7/10/2025    Procedure: Cardiac tavr;  Surgeon: Paras Bruno DO;  [facility];  Service: Cardiology    CARDIAC PACEMAKER PLACEMENT  2020    [facility]    COLON SURGERY  1992     colon resection with chemo    CRANIOTOMY Right 1989    R bleed-from fall and hit head    AK REPLACE AORTIC VALVE OPENFEMORAL ARTERY APPROACH N/A 7/10/2025    Procedure: REPLACEMENT AORTIC VALVE TRANSCATHETER (TAVR) TRANSFEMORAL W/ 26MM VALVE(ACCESS ON RIGHT) MARILYN;  Surgeon: Herbert Del Cid MD;  [facility];  Service: Cardiac Surgery    SKIN CANCER EXCISION Right     Melanoma R arm

## 2025-07-11 NOTE — CONSULTS
Consultation - Geriatric Medicine   Name: Glendy Pete 79 y.o. female I MRN: [id number]  Unit/Bed#: PPHP-303-01 I Date of Admission: 7/10/2025   Date of Service: 7/11/2025 I Hospital Day: 1   Inpatient consult to Gerontology for [facility]  Consult performed by: FLORENCIO Alvarez  Consult ordered by: Orville Navarrete PA-C        Physician Requesting Evaluation: Herbert Del Cid MD   Reason for Evaluation / Principal Problem: s/p TAVR    Assessment & Plan  Severe aortic stenosis  S/P TAVR (transcatheter aortic valve replacement)  S/p TAVR on 7/10/25  Management per CT surgery  Hypothyroid  Most recent TSH 4.293  Does not appear to be on any thyroid medication  Primary hypertension  Most recent blood pressure 123/58  Currently on diltiazem 180 mg daily, metoprolol succinate 25 mg daily  Avoid hypotension  Management per primary  Stage 3a chronic kidney disease (HCC)  Lab Results   Component Value Date    EGFR 76 07/11/2025    EGFR 73 07/10/2025    EGFR 59 07/03/2025    CREATININE 0.75 07/11/2025    CREATININE 0.77 07/10/2025    CREATININE 0.92 07/03/2025   Avoid nephrotoxic medication  Renal dose medications as needed  Encourage adequate p.o. Hydration  Avoid hypotension  Periodic lab work to monitor renal function  Continue to monitor  Paroxysmal atrial fibrillation (HCC)  Currently rate controlled and asymptomatic  On Eliquis 5 mg twice daily for anticoagulation  Other medications include diltiazem  Management per primary    Cognitive screening  No reported memory loss  Recommend check TSH and Vitamin B12  maintain neuro protective lifestyle :   Keep physically, mentally and socially active   Lower BMI   Increase physical exercise   Recommend Mediterranean/MIND diet   Monitor good control of blood pressure, blood sugar and cholesterol    History of Present Illness   Hx and PE limited by: none   HPI: Glendy Pete is a 79 y.o. year old female who presents with past medical history including, but not limited to  hypertension, A-fib, asthma, CKD, hypothyroidism, colon cancer, melanoma, and syncope-s/p pacemaker.  She presented to the hospital for elective TAVR procedure for severe aortic stenosis.    Glendy lives alone in an apartment attached to her brother and sister-in-law's house.  She does not use any assistive devices for ambulation.  She denies any recent falls.  She is independent for bathing and dressing.  Her granddaughter stretchers in the bathroom.  She is generally continent of both bowel and bladder.  She does her own cooking, cleaning, medications, and finances.  She denies any anxiety or depression.  Does report some ongoing insomnia.  Denies any recent appetite loss or weight loss.  She wears dentures, no eyeglasses or hearing aids.  Does report to some trouble hearing.  She still drives, no recent accidents or tickets.  She reports some occasional forgetfulness, nothing she is overly concerned about.  Paperwork for her stay independent, preventing falls, and healthy mind diet were provided to patient.    Upon exam patient was out of bed in the chair, finishing lunch.  She appeared comfortable and was in no acute distress.  She denied any pain.  She slept okay overnight in between wake ups.  Appetite remains decreased, low to eat a small amount of her lunch.  She is looking forward to discharging home.  Per nursing no acute concerns or issues at this time.    Review of Systems   Constitutional:  Negative for activity change and appetite change.   HENT:  Negative for trouble swallowing.    Respiratory:  Negative for cough and shortness of breath.    Cardiovascular:  Negative for chest pain.   Gastrointestinal:  Negative for abdominal pain, constipation, diarrhea, nausea and vomiting.   Genitourinary:  Negative for difficulty urinating, dysuria, frequency and urgency.   Musculoskeletal:  Negative for arthralgias, back pain and gait problem.   Neurological:  Negative for dizziness, weakness, light-headedness  and headaches.   Psychiatric/Behavioral:  Negative for confusion, dysphoric mood and sleep disturbance. The patient is not nervous/anxious.        Medical History Review: I have reviewed the patient's PMH, PSH, Social History, Family History, Meds, and Allergies   Historical Information   Past Medical History[1]  Past Surgical History[2]  Social History[3]  E-Cigarette/Vaping    E-Cigarette Use Never User      E-Cigarette/Vaping Substances    Nicotine No     THC No     CBD No     Flavoring No     Other No     Unknown No      Family History[4]    Meds/Allergies   Home medication review  Pantoprazole 40mg daily in early morning- new script  Miralax daily as needed - new script  Aspirin chewable  81mg daily  Albuterol hfa inhaler q6 as needed wheezing/sob  Atorvastatin 20mg daily  Diltiazem cd 180mg daily- filled in February   Metoprolol succinate 25mg daily- filled in February     Personally confirmed with [facility] [id number]     Objective :  Temp:  [97.1 °F (36.2 °C)-98.4 °F (36.9 °C)] 98.4 °F (36.9 °C)  HR:  [] 71  BP: ()/(55-67) 123/58  Resp:  [13-37] 29  SpO2:  [89 %-100 %] 95 %  O2 Device: Nasal cannula  Nasal Cannula O2 Flow Rate (L/min):  [2 L/min] 2 L/min    Physical Exam  Vitals reviewed.   Constitutional:       General: She is not in acute distress.     Appearance: She is not ill-appearing.      Comments: Frail appearing      Cardiovascular:      Rate and Rhythm: Normal rate and regular rhythm.   Pulmonary:      Effort: Pulmonary effort is normal. No respiratory distress.      Breath sounds: Normal breath sounds.   Abdominal:      General: Bowel sounds are normal. There is no distension.      Palpations: Abdomen is soft.     Skin:     General: Skin is warm and dry.     Neurological:      Mental Status: She is alert and oriented to person, place, and time.      Motor: No weakness.      Gait: Gait abnormal.     Psychiatric:         Mood and Affect: Mood normal.           Lab Results: I have reviewed the  "following results:CBC/BMP:   .     07/10/25  1404 07/10/25  1431 07/11/25  0409 07/11/25  0412   WBC  --   --   --  8.68   HGB 10.2*   < >  --  10.8*   HCT 30*   < >  --  32.8*   PLT  --    < >  --  196   SODIUM  --    < > 141  --    K  --    < > 3.8  --    CL  --    < > 106  --    CO2 23   < > 24  --    BUN  --    < > 17  --    CREATININE  --    < > 0.75  --    GLUC  --    < > 84  --    CAIONIZED 1.16  --   --   --    MG  --   --  1.7*  --     < > = values in this interval not displayed.    , Creatinine Clearance: Estimated Creatinine Clearance: 48.1 mL/min (by C-G formula based on SCr of 0.75 mg/dL).    Imaging Results Review: No pertinent imaging studies reviewed.  Other Study Results Review: EKG was reviewed.     Therapies:   Basic Mobility Inpatient Raw Score: 17  -[facility] Goal: 5: Stand one or more mins  -[facility] Achieved: 7: Walk 25 feet or more  PT: consulted  OT: consulted    VTE Prophylaxis: VTE covered by:  apixaban, Oral, 5 mg at 07/11/25 0925     and Sequential compression device (Venodyne)     Code Status: Level 1 - Full Code  Advance Directive and Living Will:    no  Power of :  no  POLST:  no    Family and Social Support:   Living arrangements: Lives in an apartment and her brother cystinosis  Support system: Daughter, granddaughter, and other family members    Goals of Care: Return home     Administrative Statements   I have spent a total time of 60 minutes in caring for this patient on the day of the visit/encounter including Documenting in the medical record, Reviewing/placing orders in the medical record (including tests, medications, and/or procedures), Obtaining or reviewing history  , and Communicating with other healthcare professionals .    Please note:  Voice-recognition software may have been used in the preparation of this document.  Occasional wrong word or \"sound-alike\" substitutions may have occurred due to the inherent limitations of voice recognition software.  Interpretation " should be guided by context.       [1]   Past Medical History:  Diagnosis Date    Asthma     Carotid artery stenosis     left asymptomatic 75-80%, right asymptomatic 40-45%    Carotid stenosis, asymptomatic, left     70%    CKD (chronic kidney disease), stage III (HCC)     baseline Cr 0.8-1.0    History of colon cancer     s/p resection & chemotherapy    History of melanoma excision     History of nonadherence to medical treatment     History of syncope     s/p SJM PPM    [place] (hard of hearing)     no aids    Hypertension     Hypothyroidism     PAF (paroxysmal atrial fibrillation) (HCC)     prior Flecanide, Cardizem, Eliquis    Severe aortic stenosis    [2]   Past Surgical History:  Procedure Laterality Date    CARDIAC CATHETERIZATION N/A 8/23/2024    Procedure: Cardiac Coronary Angiogram;  Surgeon: Olga Lidia Handley DO;  [facility];  Service: Cardiology    CARDIAC CATHETERIZATION  8/23/2024    Procedure: Cardiac catheterization;  Surgeon: Olga Lidia Handley DO;  [facility];  Service: Cardiology    CARDIAC PACEMAKER PLACEMENT  2020    [facility]    COLON SURGERY  1992    colon resection with chemo    CRANIOTOMY Right 1989    R bleed-from fall and hit head    SKIN CANCER EXCISION Right     Melanoma R arm   [3]   Social History  Tobacco Use    Smoking status: Never    Smokeless tobacco: Never   Vaping Use    Vaping status: Never Used   Substance and Sexual Activity    Alcohol use: Not Currently    Drug use: Never    Sexual activity: Not Currently   [4]   Family History  Problem Relation Name Age of Onset    Heart disease Mother      COPD Sister      Emphysema Sister      Colon cancer Brother      Liver cancer Paternal Grandfather

## 2025-07-11 NOTE — ASSESSMENT & PLAN NOTE
Lab Results   Component Value Date    EGFR 76 07/11/2025    EGFR 73 07/10/2025    EGFR 59 07/03/2025    CREATININE 0.75 07/11/2025    CREATININE 0.77 07/10/2025    CREATININE 0.92 07/03/2025   Avoid nephrotoxic medication  Renal dose medications as needed  Encourage adequate p.o. Hydration  Avoid hypotension  Periodic lab work to monitor renal function  Continue to monitor

## 2025-07-11 NOTE — DISCHARGE SUMMARY
Discharge Summary - Cardiac Surgery   Glendy Pete 79 y.o. female MRN: [id number]  Unit/Bed#: PPHP-303-01 Encounter: [id number]    Admission Date: 7/10/2025     Discharge Date: 07/11/25    Admitting Diagnosis: Severe aortic stenosis [I35.0]    Primary Discharge Diagnosis:   Aortic stenosis, Non-Rheumatic. S/P transfemoral transcatheter aortic valve replacement;    Secondary Discharge Diagnosis:   HTN, PAF (Eliquis), asthma, LICA stenosis (asymptomatic, follows with Dr. Yañez), CKD3, hypothyroidism, colon cancer s/p resection and chemotherapy, h/o melanoma, syncope (s/p PPM)    Attending: eHrbert Del Cid M.D.    Consulting Physician(s):   Cardiology  Gerontology  PT/OT    Procedures Performed:   Procedure(s):  7/10/2025: REPLACEMENT AORTIC VALVE TRANSCATHETER (TAVR) TRANSFEMORAL W/ 26MM VALVE(ACCESS ON RIGHT) MARILYN  Cardiac tavr     Hospital Course:   The patient was seen in consultation prior to this admission for evaluation of Aortic stenosis, Non-Rheumatic.  Risks and benefits of transfemoral transcatheter aortic valve replacement were discussed in detail, and patient was agreeable.  Routine preoperative evaluation was completed and informed consent was obtained prior to admission.    7/10: Elective admission for TF EVOLUT FX TAVR  via R approach. Extubated and transferred to PACU supported with Cardene. DP pulses palpable b/l. Restarted on home Toprol 25 QD and Cardizem 180 mg  QD. ECG shows AF without ischemic changes or conduction delays. Gerontology and cardiology consulted.     7/11: No events. NSR 70s-80s, cardene off last PM. On RA. SBP 140s. Hgb 10.8, Plts 196k, Cr 0.75. neuro/vasc intact. Groins sites stable. OOB to chair. Eating. No N/V/Abd pain. No CP/SOB. Feeling well. EKG SR 80. CXR clear. Echo pending. Cont home toprol XL 25mg daily, cardizem 180mg CD daily. ASA and Eliquis (PAF). Echo pending. Home likely today pending echo/voiding.      Condition at Discharge:   good     Discharge Physical  Exam:    Please see the documented physical exam from this morning's progress note for details.    Discharge Data:  Results from last 7 days   Lab Units 07/11/25  0412 07/10/25  1431 07/10/25  1404   WBC Thousand/uL 8.68  --   --    HEMOGLOBIN g/dL 10.8* 11.5  --    I STAT HEMOGLOBIN g/dl  --   --  10.2*   HEMATOCRIT % 32.8* 34.6*  --    HEMATOCRIT, ISTAT %  --   --  30*   PLATELETS Thousands/uL 196 189  --      Results from last 7 days   Lab Units 07/11/25  0409 07/10/25  1431 07/10/25  1404   POTASSIUM mmol/L 3.8 3.6  --    CHLORIDE mmol/L 106 109*  --    CO2 mmol/L 24 25  --    CO2, I-STAT mmol/L  --   --  23   BUN mg/dL 17 18  --    CREATININE mg/dL 0.75 0.77  --    GLUCOSE, ISTAT mg/dl  --   --  102   CALCIUM mg/dL 8.7 8.8  --            Discharge instructions/Information to patient and family:   See after visit summary for information provided to patient and family.      Glendy Pete was educated on restrictions regarding driving and lifting, and techniques of proper incisional care.  They were specifically counselled on signs and symptoms of an incisional infection, and advised to contact our service immediately should they develop fevers, sweats, chill, redness or drainage at the site of any incisions.    Provisions for Follow-Up Care:  See after visit summary for information related to follow-up care and any pertinent home health orders.      Disposition:  Home    Planned Readmission:   No    Discharge Medications:  See after visit summary for reconciled discharge medications provided to patient and family.      Glendy Pete was provided contact information and scheduled a follow up appointment with Herbert Del Cid M.D.  Additionally, follow up appointments have been scheduled for their primary care physician and primary cardiologist.  Contact information was provided.    No refills on PTA mediations needed at discharge per patient.    Glendy Pete was counseled on the importance of avoiding tobacco products.   As with all patients whom have undergone open heart surgery, tobacco cessation medication was contraindicated at the time of discharge.     ACE/ARB was Contraindicated secondary to EF > 40% and no history of heart failure    Beta Blocker was Prescribed at discharge    Aspirin was Prescribed at discharge    Statin was Prescribed at discharge      No standing diuretics PTA or at discharge due to euvolema.    ASA/Eliquis for antiplatelet w/ TAVR.    Tylenol PRN for pain control.    The patient was informed that following their postoperative surgical evaluation, they will be referred to outpatient cardiac rehabilitation.  They were counseled that this program is run by specialists who will help them safely strengthen their heart and prevent more heart disease.  Cardiac rehabilitation will include exercise, relaxation, stress management, and heart-healthy nutrition.  Caregivers will also check to make sure their medication regimen is working.    During this admission, the patient was questioned on their use of tobacco, alcohol, and illicit/non-prescription drug use in the  previous 24 months. Glendy Pete states that they have not used any of these substances in this time frame.    I spent 30 minutes discharging the patient. This time was spent on the day of discharge. I had direct contact with the patient on the day of discharge. Additional documentation is required if more than 30 minutes were spent on discharge.     SIGNATURE: Pura Gamboa PA-C  DATE: July 11, 2025  TIME: 7:45 AM

## 2025-07-12 LAB
ABO GROUP BLD BPU: NORMAL
BPU ID: NORMAL
CROSSMATCH: NORMAL
UNIT DISPENSE STATUS: NORMAL
UNIT PRODUCT CODE: NORMAL
UNIT PRODUCT VOLUME: 350 ML
UNIT RH: NORMAL

## 2025-07-12 NOTE — ANESTHESIA POSTPROCEDURE EVALUATION
Post-Op Assessment Note    CV Status:  Stable    Pain management: adequate       Mental Status:  Alert and awake   Hydration Status:  Euvolemic   PONV Controlled:  Controlled   Airway Patency:  Patent     Post Op Vitals Reviewed: Yes    No anethesia notable event occurred.    Staff: Anesthesiologist           Last Filed PACU Vitals:  Vitals Value Taken Time   Temp 97.1 °F (36.2 °C) 07/10/25 14:16   Pulse 108 07/10/25 17:15   /58 07/10/25 16:30   Resp 36 07/10/25 17:15   SpO2 93 % 07/10/25 17:15       Modified Jose:     Vitals Value Taken Time   Activity 2 07/10/25 14:45   Respiration 2 07/10/25 14:45   Circulation 2 07/10/25 14:45   Consciousness 2 07/10/25 14:45   Oxygen Saturation 1 07/10/25 14:45     Modified Jose Score: 9

## 2025-07-14 ENCOUNTER — TRANSITIONAL CARE MANAGEMENT (OUTPATIENT)
Dept: INTERNAL MEDICINE CLINIC | Facility: CLINIC | Age: 80
End: 2025-07-14

## 2025-07-14 NOTE — UTILIZATION REVIEW
NOTIFICATION OF ADMISSION DISCHARGE   This is a Notification of Discharge from [facility]. Please be advised that this patient has been discharge from our facility. Below you will find the admission and discharge date and time including the patient’s disposition.   UTILIZATION REVIEW CONTACT:  Utilization Review Assistants  Network Utilization Review Department  Phone: [phone] x carefully listen to the prompts. All voicemails are confidential.  Email: [email or web address]     ADMISSION INFORMATION  PRESENTATION DATE: 7/10/2025 10:05 AM  OBERVATION ADMISSION DATE: N/A  INPATIENT ADMISSION DATE: 7/10/25 10:17 AM   DISCHARGE DATE: 7/11/2025  6:30 PM   DISPOSITION:Home/Self Care    Network Utilization Review Department  ATTENTION: Please call with any questions or concerns to [phone] and carefully listen to the prompts so that you are directed to the right person. All voicemails are confidential.   For Discharge needs, contact Care Management DC Support Team at [phone] opt. 2  Send all requests for admission clinical reviews, approved or denied determinations and any other requests to dedicated fax number below belonging to the campus where the patient is receiving treatment. List of dedicated fax numbers for the Facilities:  FACILITY NAME UR FAX NUMBER   ADMISSION DENIALS (Administrative/Medical Necessity) [phone]   DISCHARGE SUPPORT TEAM ([facility]) [phone]   PARENT CHILD HEALTH (Maternity/NICU/Pediatrics) [phone]   [facility] [phone]   [facility] [phone]   [facility] [phone]   [facility] [phone]   [facility] [phone]   [facility] [phone]   [facility] [phone]   [facility] [phone]   [facility]  [facility] [phone]   [facility] [phone]   [facility] [phone]   [facility] [phone]

## 2025-07-16 ENCOUNTER — TELEPHONE (OUTPATIENT)
Age: 80
End: 2025-07-16

## 2025-07-16 ENCOUNTER — OFFICE VISIT (OUTPATIENT)
Dept: INTERNAL MEDICINE CLINIC | Facility: CLINIC | Age: 80
End: 2025-07-16
Payer: COMMERCIAL

## 2025-07-16 VITALS
HEIGHT: 62 IN | SYSTOLIC BLOOD PRESSURE: 136 MMHG | WEIGHT: 127 LBS | OXYGEN SATURATION: 96 % | DIASTOLIC BLOOD PRESSURE: 80 MMHG | HEART RATE: 67 BPM | BODY MASS INDEX: 23.37 KG/M2

## 2025-07-16 DIAGNOSIS — Z95.2 S/P TAVR (TRANSCATHETER AORTIC VALVE REPLACEMENT): Primary | ICD-10-CM

## 2025-07-16 DIAGNOSIS — I10 PRIMARY HYPERTENSION: ICD-10-CM

## 2025-07-16 DIAGNOSIS — E03.9 HYPOTHYROIDISM, UNSPECIFIED TYPE: ICD-10-CM

## 2025-07-16 DIAGNOSIS — I65.22 CAROTID STENOSIS, ASYMPTOMATIC, LEFT: ICD-10-CM

## 2025-07-16 PROCEDURE — 99496 TRANSJ CARE MGMT HIGH F2F 7D: CPT | Performed by: PHYSICIAN ASSISTANT

## 2025-07-16 NOTE — TELEPHONE ENCOUNTER
Daughter, Piedad, was unable to attend patient's office visit with PCP today. Piedad has questions for PCP.    Piedad asks if PCP assessed patient's incision sites and how the sites appear.    Piedad also verbalizes concern about patient's mental health and cognition. She states patient seems depressed and is increasingly forgetful.  Patient has not been taking medications regularly. Patient also forgot about today's appointment until she was reminded by family.  Piedad asks if patient would benefit from psychiatric evaluation.

## 2025-07-16 NOTE — PROGRESS NOTES
Name: Glendy Pete      : 1945      MRN: [id number]  Encounter Provider: Darrel Mtahew PA-C  Encounter Date: 2025   Encounter department: [facility] [place]  :  Assessment & Plan  S/P TAVR (transcatheter aortic valve replacement)         Carotid stenosis, asymptomatic, left    Orders:    Lipid panel; Future    Primary hypertension    Orders:    CBC and differential; Future    Comprehensive metabolic panel; Future    Hypothyroidism, unspecified type    Orders:    T4, free; Future    TSH, 3rd generation; Future           History of Present Illness   Hospital follow-up/transition of care    Patient was hospitalized at [facility] from 7/10 through 2025 secondary to severe aortic stenosis having undergone TAVR procedure.  Hospital records have been reviewed.  Patient's hospitalization was unremarkable, she remained in normal sinus rhythm and was able to be discharged the day after the procedure without problems.  She is currently restricted on her driving and lifting.  Plans for postop cardiac rehabilitation will be made after follow-up cardiovascular surgical evaluation.  Patient is stating that this time she is feeling well.  No chest pain or shortness of breath.  No cough.  No leg swelling or leg pain.  Basically she is stating she does not feel much different.    TCM Call (since 2025)     Date and time call was made  2025  7:50 AM    Hospital care reviewed  Records reviewed    Patient was hospitialized at  [facility]    Date of Admission  07/10/25    Date of discharge  25    Diagnosis  Severe aortic stenosis    Disposition  Home    Were the patients medications reviewed and updated  Yes      TCM Call (since 2025)     Post hospital issues  None    Scheduled for follow up?  Yes    Did you obtain your prescribed medications  Yes    Do you need help managing your prescriptions or medications  No    Is  "transportation to your appointment needed  No    I have advised the patient to call PCP with any new or worsening symptoms    LINDSEY Mancilla    Living Arrangements  Family members    Support System  Family    The type of support provided  Emotional; Financial; Physical    Do you have social support  Yes, as much as I need            Review of Systems   Constitutional:  Negative for activity change, chills, fatigue and fever.   HENT:  Negative for congestion.    Eyes:  Negative for discharge.   Respiratory:  Negative for cough, chest tightness, shortness of breath and wheezing.    Cardiovascular:  Negative for chest pain, palpitations and leg swelling.   Gastrointestinal:  Negative for abdominal pain, blood in stool, constipation, diarrhea, nausea and vomiting.   Endocrine: Negative for polydipsia, polyphagia and polyuria.   Genitourinary:  Negative for difficulty urinating.   Musculoskeletal:  Negative for arthralgias and myalgias.   Skin:  Negative for rash.   Allergic/Immunologic: Negative for immunocompromised state.   Neurological:  Negative for dizziness, syncope, weakness, light-headedness and headaches.   Hematological:  Negative for adenopathy. Does not bruise/bleed easily.   Psychiatric/Behavioral:  Negative for dysphoric mood, sleep disturbance and suicidal ideas. The patient is not nervous/anxious.        Objective   /80 (BP Location: Left arm, Patient Position: Sitting, Cuff Size: Standard)   Pulse 67   Ht 5' 2\" (1.575 m)   Wt 57.6 kg (127 lb)   SpO2 96%   BMI 23.23 kg/m²      Physical Exam  Constitutional:       General: She is not in acute distress.     Appearance: Normal appearance.   HENT:      Head: Normocephalic.     Eyes:      Extraocular Movements: Extraocular movements intact.      Pupils: Pupils are equal, round, and reactive to light.     Neck:      Thyroid: No thyromegaly.      Vascular: Carotid bruit (Bilaterally) present.      Trachea: Trachea normal.     Cardiovascular:      " Rate and Rhythm: Normal rate and regular rhythm. Occasional Extrasystoles are present.     Heart sounds: No murmur heard.  Pulmonary:      Effort: Pulmonary effort is normal. No respiratory distress.      Breath sounds: Normal breath sounds.     Musculoskeletal:         General: No swelling or tenderness.      Cervical back: Neck supple.      Right lower leg: No edema.      Left lower leg: No edema.   Lymphadenopathy:      Cervical: No cervical adenopathy.     Skin:     General: Skin is warm and dry.      Findings: No rash.     Neurological:      General: No focal deficit present.      Mental Status: She is alert and oriented to person, place, and time. Mental status is at baseline.     Psychiatric:         Mood and Affect: Mood normal.         Behavior: Behavior normal.

## 2025-07-16 NOTE — PATIENT INSTRUCTIONS
Continue current medications.  Follow-up with cardiology and cardiovascular surgery as scheduled.  Will plan follow-up here in 4 months with repeat labs for that visit.  Okay

## 2025-07-17 RX ORDER — MUPIROCIN 2 %
OINTMENT (GRAM) TOPICAL 2 TIMES DAILY
Qty: 22 G | Refills: 0 | OUTPATIENT
Start: 2025-07-17

## 2025-07-21 ENCOUNTER — TELEPHONE (OUTPATIENT)
Dept: CARDIAC SURGERY | Facility: CLINIC | Age: 80
End: 2025-07-21

## 2025-07-21 DIAGNOSIS — R41.89 COGNITIVE DECLINE: Primary | ICD-10-CM

## 2025-07-21 NOTE — TELEPHONE ENCOUNTER
Attempted to call patient for routine postop check-in.  No answer, and voicemail box full so unable to leave a message.

## 2025-07-21 NOTE — TELEPHONE ENCOUNTER
Spoke with daughter Piedad, several concerns that sound almost as if patient is depressed.  Not doing self-care, wanting to get out of bed, however not sleeping at night.  Daughter has concerns for dementia would like patient seen by neurology.  We did discuss starting patient on antidepressant he is in favor of doing so.  Will enter referral for neurology.

## 2025-07-31 NOTE — TELEPHONE ENCOUNTER
I have contacted patient's brother and also spoken to him about bringing patient in for follow-up visit and discussing initiating medication.  Brother will talk with patient's daughter and arrange a follow-up visit.

## 2025-08-13 ENCOUNTER — HOSPITAL ENCOUNTER (OUTPATIENT)
Dept: NON INVASIVE DIAGNOSTICS | Facility: HOSPITAL | Age: 80
Discharge: HOME | End: 2025-08-13
Payer: COMMERCIAL

## 2025-08-13 ENCOUNTER — TELEPHONE (OUTPATIENT)
Dept: CARDIAC SURGERY | Facility: CLINIC | Age: 80
End: 2025-08-13

## 2025-08-13 ENCOUNTER — TELEPHONE (OUTPATIENT)
Dept: VASCULAR SURGERY | Facility: CLINIC | Age: 80
End: 2025-08-13

## 2025-08-13 ENCOUNTER — OFFICE VISIT (OUTPATIENT)
Dept: CARDIAC SURGERY | Facility: CLINIC | Age: 80
End: 2025-08-13
Payer: COMMERCIAL

## 2025-08-13 ENCOUNTER — HOSPITAL ENCOUNTER (OUTPATIENT)
Dept: NON INVASIVE DIAGNOSTICS | Facility: HOSPITAL | Age: 80
Discharge: HOME | End: 2025-08-13
Attending: NURSE PRACTITIONER
Payer: COMMERCIAL

## (undated) DEVICE — Device

## (undated) DEVICE — RADIFOCUS OPTITORQUE ANGIOGRAPHIC CATHETER: Brand: OPTITORQUE

## (undated) DEVICE — DGW .035 FC STR 260CM TEF: Brand: EMERALD

## (undated) DEVICE — PINNACLE INTRODUCER SHEATH: Brand: PINNACLE

## (undated) DEVICE — REM POLYHESIVE ADULT PATIENT RETURN ELECTRODE: Brand: VALLEYLAB

## (undated) DEVICE — THERMOFLECT BLANKET, L, 25EA                               TS THERMOFLECT BLANKET, 48" X 84", SILVER, 5/BG, 5 BG/CS NW: Brand: THERMOFLECT

## (undated) DEVICE — EXOFIN PRECISION PEN HIGH VISCOSITY TOPICAL SKIN ADHESIVE: Brand: EXOFIN PRECISION PEN, 1G

## (undated) DEVICE — PACK CUSTOM TAVR

## (undated) DEVICE — INTENDED FOR TISSUE SEPARATION, AND OTHER PROCEDURES THAT REQUIRE A SHARP SURGICAL BLADE TO PUNCTURE OR CUT.: Brand: BARD-PARKER ® CARBON RIB-BACK BLADES

## (undated) DEVICE — CATH F5 INF PIG145 110CM 6SH: Brand: INFINITI

## (undated) DEVICE — GLIDESHEATH SLENDER STAINLESS STEEL KIT: Brand: GLIDESHEATH SLENDER

## (undated) DEVICE — PACK CUSTOM PERFUSION PLEG PK

## (undated) DEVICE — SWAN-GANZ BIPOLAR PACING CATHETER: Brand: SWAN-GANZ

## (undated) DEVICE — 3000CC GUARDIAN II: Brand: GUARDIAN

## (undated) DEVICE — AMPLATZ EXTRA STIFF WIRE GUIDE: Brand: AMPLATZ

## (undated) DEVICE — HEAVY DUTY TABLE COVER: Brand: CONVERTORS

## (undated) DEVICE — CARDIOVASCULAR SPLIT DRAPE: Brand: CONVERTORS

## (undated) DEVICE — PACK CUSTOM PERFUSION AV LOOP

## (undated) DEVICE — BETHLEHEM MAJOR GENERAL PACK: Brand: CARDINAL HEALTH

## (undated) DEVICE — GUIDEWIRE WHOLEY HI TORQUE INTERM MOD J .035 145CM

## (undated) DEVICE — DGW .035 FC J3MM 150CM TEF: Brand: EMERALD

## (undated) DEVICE — DGW .035 FC J3MM 260CM TEF: Brand: EMERALD

## (undated) DEVICE — TR BAND RADIAL ARTERY COMPRESSION DEVICE: Brand: TR BAND